# Patient Record
Sex: MALE | Race: WHITE | Employment: FULL TIME | URBAN - METROPOLITAN AREA
[De-identification: names, ages, dates, MRNs, and addresses within clinical notes are randomized per-mention and may not be internally consistent; named-entity substitution may affect disease eponyms.]

---

## 2017-01-24 DIAGNOSIS — I33.0 SBE (SUBACUTE BACTERIAL ENDOCARDITIS): Primary | ICD-10-CM

## 2017-01-24 RX ORDER — AMOXICILLIN 500 MG/1
2000 CAPSULE ORAL ONCE
Qty: 4 CAPSULE | Refills: 1 | Status: SHIPPED | OUTPATIENT
Start: 2017-01-24 | End: 2017-01-24

## 2017-04-13 ENCOUNTER — OFFICE VISIT (OUTPATIENT)
Dept: FAMILY MEDICINE | Facility: CLINIC | Age: 27
End: 2017-04-13
Payer: COMMERCIAL

## 2017-04-13 VITALS
TEMPERATURE: 98.3 F | OXYGEN SATURATION: 100 % | SYSTOLIC BLOOD PRESSURE: 120 MMHG | HEART RATE: 93 BPM | BODY MASS INDEX: 25.37 KG/M2 | DIASTOLIC BLOOD PRESSURE: 78 MMHG | WEIGHT: 171.8 LBS

## 2017-04-13 DIAGNOSIS — I10 BENIGN ESSENTIAL HYPERTENSION: ICD-10-CM

## 2017-04-13 DIAGNOSIS — R20.2 NUMBNESS AND TINGLING: ICD-10-CM

## 2017-04-13 DIAGNOSIS — M25.422 SWELLING OF JOINT, ELBOW, LEFT: ICD-10-CM

## 2017-04-13 DIAGNOSIS — R20.0 NUMBNESS AND TINGLING: ICD-10-CM

## 2017-04-13 DIAGNOSIS — R19.7 DIARRHEA, UNSPECIFIED TYPE: Primary | ICD-10-CM

## 2017-04-13 DIAGNOSIS — Z13.220 SCREENING CHOLESTEROL LEVEL: ICD-10-CM

## 2017-04-13 LAB
ALBUMIN SERPL-MCNC: 4.6 G/DL (ref 3.4–5)
ALP SERPL-CCNC: 55 U/L (ref 40–150)
ALT SERPL W P-5'-P-CCNC: 27 U/L (ref 0–70)
ANION GAP SERPL CALCULATED.3IONS-SCNC: 8 MMOL/L (ref 3–14)
AST SERPL W P-5'-P-CCNC: 27 U/L (ref 0–45)
BILIRUB SERPL-MCNC: 0.8 MG/DL (ref 0.2–1.3)
BUN SERPL-MCNC: 8 MG/DL (ref 7–30)
CALCIUM SERPL-MCNC: 9.4 MG/DL (ref 8.5–10.1)
CHLORIDE SERPL-SCNC: 105 MMOL/L (ref 94–109)
CHOLEST SERPL-MCNC: 124 MG/DL
CO2 SERPL-SCNC: 29 MMOL/L (ref 20–32)
CREAT SERPL-MCNC: 0.94 MG/DL (ref 0.66–1.25)
GFR SERPL CREATININE-BSD FRML MDRD: ABNORMAL ML/MIN/1.7M2
GLUCOSE SERPL-MCNC: 101 MG/DL (ref 70–99)
HDLC SERPL-MCNC: 55 MG/DL
LDLC SERPL CALC-MCNC: 51 MG/DL
NONHDLC SERPL-MCNC: 69 MG/DL
POTASSIUM SERPL-SCNC: 4.5 MMOL/L (ref 3.4–5.3)
PROT SERPL-MCNC: 7.2 G/DL (ref 6.8–8.8)
SODIUM SERPL-SCNC: 142 MMOL/L (ref 133–144)
TRIGL SERPL-MCNC: 90 MG/DL
VIT B12 SERPL-MCNC: 508 PG/ML (ref 193–986)

## 2017-04-13 PROCEDURE — 82607 VITAMIN B-12: CPT | Performed by: NURSE PRACTITIONER

## 2017-04-13 PROCEDURE — 80053 COMPREHEN METABOLIC PANEL: CPT | Performed by: NURSE PRACTITIONER

## 2017-04-13 PROCEDURE — 36415 COLL VENOUS BLD VENIPUNCTURE: CPT | Performed by: NURSE PRACTITIONER

## 2017-04-13 PROCEDURE — 80061 LIPID PANEL: CPT | Performed by: NURSE PRACTITIONER

## 2017-04-13 PROCEDURE — 83516 IMMUNOASSAY NONANTIBODY: CPT | Performed by: NURSE PRACTITIONER

## 2017-04-13 PROCEDURE — 99214 OFFICE O/P EST MOD 30 MIN: CPT | Performed by: NURSE PRACTITIONER

## 2017-04-13 NOTE — MR AVS SNAPSHOT
After Visit Summary   4/13/2017    Ludwin Massey    MRN: [id number]           Patient Information     Date Of Birth          1990        Visit Information        Provider Department      4/13/2017 9:00 AM Tamiko Flannery APRN CNP [facility]        Today's Diagnoses     Screening cholesterol level    -  1    Benign essential hypertension        Diarrhea, unspecified type        Numbness and tingling        Swelling of joint, elbow, left           Follow-ups after your visit        Additional Services     ORTHO  REFERRAL       [facility] is referring you to the Orthopedic  Services at [facility].       The  Representative will assist you in the coordination of your Orthopedic and Musculoskeletal Care as prescribed by your physician.    The  Representative will call you within 1 business day to help schedule your appointment, or you may contact the  Representative at:    All areas ~ [phone]     Type of Referral : Surgical / Specialist       Timeframe requested: Routine    Coverage of these services is subject to the terms and limitations of your health insurance plan.  Please call member services at your health plan with any benefit or coverage questions.      If X-rays, CT or MRI's have been performed, please contact the facility where they were done to arrange for , prior to your scheduled appointment.  Please bring this referral request to your appointment and present it to your specialist.                  Future tests that were ordered for you today     Open Future Orders        Priority Expected Expires Ordered    XR Elbow Left 2 Views Routine 4/13/2017 4/13/2018 4/13/2017            Who to contact     If you have questions or need follow up information about today's clinic visit or your schedule please contact [facility] directly at [phone].  Normal or  non-critical lab and imaging results will be communicated to you by MyChart, letter or phone within 4 business days after the clinic has received the results. If you do not hear from us within 7 days, please contact the clinic through [employer] or phone. If you have a critical or abnormal lab result, we will notify you by phone as soon as possible.  Submit refill requests through [employer] or call your pharmacy and they will forward the refill request to us. Please allow 3 business days for your refill to be completed.          Additional Information About Your Visit        [employer] Information     [employer] gives you secure access to your electronic health record. If you see a primary care provider, you can also send messages to your care team and make appointments. If you have questions, please call your primary care clinic.  If you do not have a primary care provider, please call [phone] and they will assist you.        Care EveryWhere ID     This is your Care EveryWhere ID. This could be used by other organizations to access your [facility] medical records  [id number]        Your Vitals Were     Pulse Temperature Pulse Oximetry BMI (Body Mass Index)          93 98.3  F (36.8  C) (Oral) 100% 25.37 kg/m2         Blood Pressure from Last 3 Encounters:   04/13/17 120/78   09/12/16 128/84   08/12/16 136/82    Weight from Last 3 Encounters:   04/13/17 171 lb 12.8 oz (77.9 kg)   09/12/16 175 lb (79.4 kg)   08/12/16 176 lb 2.4 oz (79.9 kg)              We Performed the Following     Comprehensive metabolic panel     Lipid Profile     [facility]     Tissue transglutaminase blair IgA and IgG     Vitamin B12        Primary Care Provider    [facility]       No address on file        Thank you!     Thank you for choosing [facility]  for your care. Our goal is always to provide you with excellent care. Hearing back from our patients is one way we can continue to improve our  services. Please take a few minutes to complete the written survey that you may receive in the mail after your visit with us. Thank you!             Your Updated Medication List - Protect others around you: Learn how to safely use, store and throw away your medicines at www.disposemymeds.org.          This list is accurate as of: 4/13/17  9:21 AM.  Always use your most recent med list.                   Brand Name Dispense Instructions for use    MULTIVITAMIN PO      Take 1 tablet by mouth daily Reported on 4/13/2017

## 2017-04-13 NOTE — NURSING NOTE
"Chief Complaint   Patient presents with     Blood Draw     Diarrhea       Initial /90 (BP Location: Right arm, Patient Position: Chair, Cuff Size: Adult Regular)  Pulse 93  Temp 98.3  F (36.8  C) (Oral)  Wt 171 lb 12.8 oz (77.9 kg)  SpO2 100%  BMI 25.37 kg/m2 Estimated body mass index is 25.37 kg/(m^2) as calculated from the following:    Height as of 9/12/16: 5' 9\" (1.753 m).    Weight as of this encounter: 171 lb 12.8 oz (77.9 kg).  Medication Reconciliation: complete     Sangita Diggs CMA    "

## 2017-04-13 NOTE — PROGRESS NOTES
SUBJECTIVE:                                                    Ludwin Massey is a 27 year old male who presents to clinic today for the following health issues:    Diarrhea     Onset: Over 10 years    Description:   Consistency of stool: loose  Blood in stool: no   Number of loose stools in past 24 hours: 0    Progression of Symptoms:  improving    Accompanying Signs & Symptoms:  Fever: no   Nausea or vomiting; no   Abdominal pain: no   Episodes of constipation: no   Weight loss: no    History:   Ill contacts: no   Recent use of antibiotics: 2 months ago  Recent travels: no          Recent medication-new or changes(Rx or OTC): no     Precipitating factors:   Gluten    Alleviating factors:   Eliminating gluten from diet, very helpful. A few of his relatives have celiac disease/gluten sensitivity, he would like to be tested today.       About 10 years ago had an injury to left elbow while playing hockey- arm was pulled and hyperextended. Occasionally gets swelling and pain in left elbow. Does not typically have issues with range of motion of arm or strength in arm. Occasionally notices numbness and tingling in pinkie finger if hold bhis arm a certain way- needs to readjust while working at his desk occasionally.    Had sciatica several years ago, which occasionally flares up and causes occasional numbness in left thigh. No weakness in leg, no bowel or bladder symptoms. No current back pain issues.     Hypertension Follow-up      Outpatient blood pressures are being checked at home.  Results are 115/70.    Low Salt Diet: no added salt    Exercising regularly       Problem list and histories reviewed & adjusted, as indicated.  Additional history: as documented    BP Readings from Last 3 Encounters:   04/13/17 120/78   09/12/16 128/84   08/12/16 136/82    Wt Readings from Last 3 Encounters:   04/13/17 171 lb 12.8 oz (77.9 kg)   09/12/16 175 lb (79.4 kg)   08/12/16 176 lb 2.4 oz (79.9 kg)                  Labs reviewed  in EPIC    Reviewed and updated as needed this visit by clinical staff       Reviewed and updated as needed this visit by Provider         ROS:  Constitutional, HEENT, cardiovascular, pulmonary, gi and gu systems are negative, except as otherwise noted.    OBJECTIVE:                                                    /78  Pulse 93  Temp 98.3  F (36.8  C) (Oral)  Wt 171 lb 12.8 oz (77.9 kg)  SpO2 100%  BMI 25.37 kg/m2  Body mass index is 25.37 kg/(m^2).  GENERAL: healthy, alert and no distress  NECK: no adenopathy, no asymmetry, masses, or scars and thyroid normal to palpation  RESP: lungs clear to auscultation - no rales, rhonchi or wheezes  CV: regular rate and rhythm, normal S1 S2, no S3 or S4, no murmur, click or rub, no peripheral edema and peripheral pulses strong  ABDOMEN: soft, nontender, no hepatosplenomegaly, no masses and bowel sounds normal  MS: RUE exam shows normal strength and muscle mass, no deformities, no erythema, induration, or nodules, ROM of all joints is normal and no evidence of joint instability and LUE exam shows normal strength and muscle mass, no deformities, no erythema, induration, or nodules, no evidence of joint effusion and ROM of all joints is normal  BACK: no CVA tenderness, no paralumbar tenderness; normal patellar reflexes bilaterally  Diagnostic Test Results:  No results found for this or any previous visit (from the past 24 hour(s)).     ASSESSMENT/PLAN:                                                      Problem List Items Addressed This Visit     Benign essential hypertension    Relevant Orders    Comprehensive metabolic panel (Completed)      Other Visit Diagnoses     Screening cholesterol level    -  Primary    Relevant Orders    Lipid Profile (Completed)    Diarrhea, unspecified type        Relevant Orders    Tissue transglutaminase blair IgA and IgG (Completed)    Numbness and tingling        Relevant Orders    Vitamin B12 (Completed)    Swelling of joint, elbow,  left        Relevant Orders    XR Elbow Left 2 Views    ORTHO  REFERRAL (Completed)         Discussed okay to continue gluten free diet if it relieves his diarrhea symptoms, even if he does not have celiac disease.   Will xray Elbow and send to Ortho to further evaluate, discussed following up for back if symptoms worsen or if he would like to try physical therapy.  Blood pressure currently well controlled.  DEBIBE [facility]  Please note greater than 50% of this 30 minute appointment were spent in counseling with the patient of the issues described above in the history of present illness and in the plan, including ordering blood work

## 2017-04-18 LAB
TTG IGA SER-ACNC: 1 U/ML
TTG IGG SER-ACNC: NORMAL U/ML

## 2017-10-23 ENCOUNTER — HOSPITAL ENCOUNTER (OUTPATIENT)
Dept: CARDIOLOGY | Facility: CLINIC | Age: 27
Discharge: HOME | End: 2017-10-23
Attending: INTERNAL MEDICINE | Admitting: INTERNAL MEDICINE
Payer: COMMERCIAL

## 2017-10-23 DIAGNOSIS — Z87.74 S/P CLOSURE OF CONGENITAL ATRIAL SEPTAL DEFECT BY PERCUTANEOUS TRANSCATHETER TECHNIQUE: ICD-10-CM

## 2017-10-23 PROCEDURE — 93306 TTE W/DOPPLER COMPLETE: CPT

## 2017-10-23 PROCEDURE — 93306 TTE W/DOPPLER COMPLETE: CPT | Mod: 26 | Performed by: INTERNAL MEDICINE

## 2017-11-07 ENCOUNTER — PRE VISIT (OUTPATIENT)
Dept: CARDIOLOGY | Facility: CLINIC | Age: 27
End: 2017-11-07

## 2017-11-07 PROBLEM — Q24.9 CONGENITAL HEART ANOMALY: Status: ACTIVE | Noted: 2017-11-07

## 2017-11-07 PROBLEM — Q23.82 CONGENITAL CLEFT LEAFLET OF MITRAL VALVE: Status: ACTIVE | Noted: 2017-11-07

## 2017-11-09 ENCOUNTER — OFFICE VISIT (OUTPATIENT)
Dept: CARDIOLOGY | Facility: CLINIC | Age: 27
End: 2017-11-09
Attending: INTERNAL MEDICINE
Payer: COMMERCIAL

## 2017-11-09 VITALS
HEIGHT: 69 IN | HEART RATE: 88 BPM | SYSTOLIC BLOOD PRESSURE: 146 MMHG | BODY MASS INDEX: 24.87 KG/M2 | WEIGHT: 167.9 LBS | DIASTOLIC BLOOD PRESSURE: 90 MMHG

## 2017-11-09 DIAGNOSIS — I10 HYPERTENSION, UNSPECIFIED TYPE: ICD-10-CM

## 2017-11-09 DIAGNOSIS — Q23.82 CONGENITAL CLEFT LEAFLET OF MITRAL VALVE: Primary | ICD-10-CM

## 2017-11-09 DIAGNOSIS — Z87.74 S/P CLOSURE OF CONGENITAL ATRIAL SEPTAL DEFECT BY PERCUTANEOUS TRANSCATHETER TECHNIQUE: ICD-10-CM

## 2017-11-09 DIAGNOSIS — Q21.20 ASD (ATRIAL SEPTAL DEFECT), OSTIUM PRIMUM: ICD-10-CM

## 2017-11-09 PROCEDURE — 99214 OFFICE O/P EST MOD 30 MIN: CPT | Performed by: INTERNAL MEDICINE

## 2017-11-09 RX ORDER — METOPROLOL SUCCINATE 25 MG/1
25 TABLET, EXTENDED RELEASE ORAL DAILY
Qty: 90 TABLET | Refills: 3 | Status: SHIPPED | OUTPATIENT
Start: 2017-11-09 | End: 2017-12-14

## 2017-11-09 NOTE — PATIENT INSTRUCTIONS
"You were seen today in the Adult Congenital and Cardiovascular Genetics Clinic at the [facility].    Cardiology Providers you saw during your visit:  Dr. 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  Continue to eat a heart healthy, low salt diet.  2.  Continue to get 20-30 minutes of aerobic activity, 4-5 days per week.  Examples of aerobic activity include walking, running, swimming, cycling, etc.  3.  Continue to observe good oral hygiene, with regular dental visits.  4.  Please start Metoprolol XL 25 mg daily. Please call if you do not tolerate this medication.  5. 2-3 weeks after starting the Metoprolol please wear an ambulatory blood pressure monitor for 24 hours to see if this is better controlling your blood pressure.  6. Please have a sleep study done. The phone number is [phone].      Vitals:    11/09/17 0844 11/09/17 0904   BP: (!) 158/103 146/90   Pulse: 88    Weight: 76.2 kg (167 lb 14.4 oz)    Height: 1.753 m (5' 9\")        SBE prophylaxis:   Yes____  No__X__    Lifelong Bacterial Endocarditis Prophylaxis:  YES____  NO__X__    If YES is checked, follow the recommendations outlined below:  1. Take antibiotic(s) prior to interventional procedures or surgeries (dental, respiratory, urologic, gastrointestinal, gynecologic), or instrumental examinations.   2. Observe good oral hygiene daily, as advised by your dentist. Get regular professional dental care.  3. Keep cuts clean.  4. Infections should be treated promptly.      Exercise restrictions:   Yes__X__  No____         If yes, list restrictions:  Must be allowed to rest if fatigued or SOB      Work restrictions:  Yes____  No__X__         If yes, list restrictions:    FASTING CHOLESTEROL was checked in the last 5 years YES_X__  NO___ 2017  Continue to eat a heart healthy, low salt diet.         ____ Fasting lipid " panel order today         ____ No changes in medications          ____ I recommend the following changes in your cholesterol medications.:          ____ Please follow up for cholesterol screening at your primary care physician      Follow-up:  Follow up with Dr. Rojo in 1 year with a stress test and an echo.    For after hours urgent needs, call [phone] and ask to speak to the Adult Congenital Physician on call.  Mention Job Code 0401.    For emergencies call [phone].    For any scheduling needs and to contact your nurse in the Adult Congenital and Cardiovascular Genetics Clinic, please call [facility] , at [phone].    Thank you for your visit today!  If you have questions or concerns about today's visit, please call me.    Norman Juares RN, BSN  Cardiology Care Coordinator  [facility]  [email or web address]  Ph.[phone]    [facility]  [facility]   Clinics and [facility] Center  Mail Code [id number]  [address] [place]  [address]

## 2017-11-09 NOTE — LETTER
11/9/2017    Tamiko Flannery, APRN CNP  [address]  [facility] [address]    RE: Ludwin Massey       Dear Colleague,    I had the pleasure of seeing Ludwin Massey in the [facility].    Mr. 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  This was actually not diagnosed until he was 13 years old.  He was having palpitations and ended up with an echo in [place] and then had surgery at the [place].  He was followed by Dr. Wang since 2003 with his surgery in 06/2003.  We do not have that operative report at this time, but we are working on getting it.      Mr. Massey has been followed throughout the years and has been doing well actually.  His repair is excellent.  No residual deficit and just mild mitral and tricuspid insufficiency.  There is no evidence of pulmonary hypertension.  He is active.  He rides bike and has symptoms that are only concerning for palpitations, but no significant fatigue or shortness of breath.  His palpitations actually he has been able to correlate with diet and with alcohol.  He notes that gluten free he feels significantly better and his lifelong abdominal issues are resolved and his palpitations are lessened.  He notes with alcohol that he has palpitations that are worse as well.  When he saw Dr. Patton last year, Dr. Patton did a Holter monitor and that showed 3-4 beats of ventricular tachycardia that was triggered as one of his events as well as a short run of SVT.  He does note on occasion when he gets palpitations that if he squats down his symptoms will resolve.  He actually has been able to manage his palpitations, however, with his diet as noted and, again, notes they are significantly better.  He does not drink alcohol at this time as well because again the palpitations are worse.        Mr. Massey underwent an echo as part of his visit  today.  This was done on 10/23/2017 and this showed mild mitral and tricuspid insufficiency and no other significant findings.  Normal left and right ventricular chamber size and function without any significant pulmonary hypertension.  His RVSP is estimated at 18+ right atrial pressure, which at most is 10.  He is hypertensive today and notes that he has noted his blood pressures have been trending up.  Even on the 3-cycle blood pressure reading, he was in the 150s.  He is not currently on any medications.  He has no family history of congenital heart disease or early coronary artery disease.  His dad, however,  at 42.  He was an over-the-road  and weighed about 375 and apparently  from a PE unfortunately.  There are 2 brothers who are healthy and mom is living.  He has been taking antibiotic prophylaxis before dental work but is aware now of the guidelines and how those recommendations have changed.  He is .  His wife works in [employer].  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  His wife did have a fetal echo and the heart was structurally normal.  The only other symptom that is concerning is he has trouble sometimes waking up in the middle of the night short of breath and comes from a family history, in fact all of his siblings have obstructive sleep apnea and are not overweight.  He has not had a sleep study at this point and does meet criteria given his hypertension at a young age and his symptoms.  There is no tobacco use, and again no concerning symptoms.  No syncope, presyncope.     Outpatient Encounter Prescriptions as of 2017   Medication Sig Dispense Refill     metoprolol (TOPROL-XL) 25 MG 24 hr tablet Take 1 tablet (25 mg) by mouth daily 90 tablet 3     [DISCONTINUED] Multiple Vitamins-Minerals (MULTIVITAMIN OR) Take 1 tablet by mouth daily Reported on 2017       No facility-administered encounter  medications on file as of 11/9/2017.       IMPRESSION, REPORT AND PLAN:   1.  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  We do not have this operative report.   2.  Mild mitral and tricuspid insufficiency, stable.   3.  Possible obstructive sleep apnea.   4.  History of symptomatic short runs of SVT and PVCs with runs up to 4 beats on Zio Patch monitoring 09/2016.  Improved with diet.   5.  Probable celiac disease.   6.  Hypertension.      DISCUSSION:  It was a pleasure to be involved in the care of Mr. Massey.  Today I discussed his history, symptoms and his testing in detail as outlined.  He is aware of his cardiac anatomy.  At this time, it seems like he has really been able to correlate his palpitations with diet as if he has gluten they get worse, as well as alcohol, which is not unsurprising.  His symptoms do sound like celiac disease and they are so bad when he does have gluten that he does not want to come off a gluten-free diet for the period of 6 weeks to make the definitive diagnosis, which is understandable.  Continuing with his diet as he is doing that he feels best with is appropriate.  For his symptoms of sleep apnea, I think it is warranted to do a sleep study and will get that ordered for him as well.  We discussed doing CPX and a repeat echo in a year.  For his hypertension, we discussed starting Toprol 25 mg a day at night.  This should help not only with hypertension but also with the occasional palpitations.  If he is unable to tolerate the Toprol for fatigue or other issues, we could to diltiazem or Cardizem.  He will let us know.  We discussed doing an ambulatory blood pressure monitor a few weeks after starting the Toprol just to be certain that he is under control.  At this point, he does not need antibiotic prophylaxis before dental work.  All questions were answered.  It was a pleasure to see him.  Please do not hesitate  to contact me with any questions or concerns.     Sincerely,    Rubina Rojo MD     [facility]

## 2017-11-09 NOTE — PROGRESS NOTES
HISTORY OF PRESENT ILLNESS:  Mr. 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  This was actually not diagnosed until he was 13 years old.  He was having palpitations and ended up with an echo in [place] and then had surgery at the [place].  He was followed by Dr. Wang since 2003 with his surgery in 06/2003.  We do not have that operative report at this time, but we are working on getting it.      Mr. Massey has been followed throughout the years and has been doing well actually.  His repair is excellent.  No residual deficit and just mild mitral and tricuspid insufficiency.  There is no evidence of pulmonary hypertension.  He is active.  He rides bike and has symptoms that are only concerning for palpitations, but no significant fatigue or shortness of breath.  His palpitations actually he has been able to correlate with diet and with alcohol.  He notes that gluten free he feels significantly better and his lifelong abdominal issues are resolved and his palpitations are lessened.  He notes with alcohol that he has palpitations that are worse as well.  When he saw Dr. Patton last year, Dr. Patton did a Holter monitor and that showed 3-4 beats of ventricular tachycardia that was triggered as one of his events as well as a short run of SVT.  He does note on occasion when he gets palpitations that if he squats down his symptoms will resolve.  He actually has been able to manage his palpitations, however, with his diet as noted and, again, notes they are significantly better.  He does not drink alcohol at this time as well because again the palpitations are worse.        Mr. Massey underwent an echo as part of his visit today.  This was done on 10/23/2017 and this showed mild mitral and tricuspid insufficiency and no other significant findings.  Normal left and right ventricular chamber size and function without any  significant pulmonary hypertension.  His RVSP is estimated at 18+ right atrial pressure, which at most is 10.  He is hypertensive today and notes that he has noted his blood pressures have been trending up.  Even on the 3-cycle blood pressure reading, he was in the 150s.  He is not currently on any medications.  He has no family history of congenital heart disease or early coronary artery disease.  His dad, however,  at 42.  He was an over-the-road  and weighed about 375 and apparently  from a PE unfortunately.  There are 2 brothers who are healthy and mom is living.  He has been taking antibiotic prophylaxis before dental work but is aware now of the guidelines and how those recommendations have changed.  He is .  His wife works in [employer].  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  His wife did have a fetal echo and the heart was structurally normal.  The only other symptom that is concerning is he has trouble sometimes waking up in the middle of the night short of breath and comes from a family history, in fact all of his siblings have obstructive sleep apnea and are not overweight.  He has not had a sleep study at this point and does meet criteria given his hypertension at a young age and his symptoms.  There is no tobacco use, and again no concerning symptoms.  No syncope, presyncope.      IMPRESSION, REPORT AND PLAN:   1.  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  We do not have this operative report.   2.  Mild mitral and tricuspid insufficiency, stable.   3.  Possible obstructive sleep apnea.   4.  History of symptomatic short runs of SVT and PVCs with runs up to 4 beats on Zio Patch monitoring 2016.  Improved with diet.   5.  Probable celiac disease.   6.  Hypertension.      DISCUSSION:  It was a pleasure to be involved in the care of   Shilpa.  Today I discussed his history, symptoms and his testing in detail as outlined.  He is aware of his cardiac anatomy.  At this time, it seems like he has really been able to correlate his palpitations with diet as if he has gluten they get worse, as well as alcohol, which is not unsurprising.  His symptoms do sound like celiac disease and they are so bad when he does have gluten that he does not want to come off a gluten-free diet for the period of 6 weeks to make the definitive diagnosis, which is understandable.  Continuing with his diet as he is doing that he feels best with is appropriate.  For his symptoms of sleep apnea, I think it is warranted to do a sleep study and will get that ordered for him as well.  We discussed doing CPX and a repeat echo in a year.  For his hypertension, we discussed starting Toprol 25 mg a day at night.  This should help not only with hypertension but also with the occasional palpitations.  If he is unable to tolerate the Toprol for fatigue or other issues, we could to diltiazem or Cardizem.  He will let us know.  We discussed doing an ambulatory blood pressure monitor a few weeks after starting the Toprol just to be certain that he is under control.  At this point, he does not need antibiotic prophylaxis before dental work.  All questions were answered.  It was a pleasure to see him.  Please do not hesitate to contact me with any questions or concerns.         ETHAN ANDREWS MD             D: 2017 10:10   T: 2017 11:55   MT: gian      Name:     MEL GREER   MRN:      [id number]        Account:      [id number]   :      1990           Service Date: 2017      Document: [id number]

## 2017-11-09 NOTE — NURSING NOTE
Chief Complaint   Patient presents with     [facility] Cardiac testing     Echo     Congenital Heart Disease        Cardiac Testing: Patient given instructions regarding  echocardiogram . Discussed purpose, preparation, procedure and when to expect results reported back to the patient. Patient demonstrated understanding of this information and agreed to call with further questions or concerns.  Med Reconcile: Reviewed and verified all current medications with the patient. The updated medication list was printed and given to the patient.  Return Appointment: Patient given instructions regarding scheduling next clinic visit. Patient demonstrated understanding of this information and agreed to call with further questions or concerns.  Patient stated he understood all health information given and agreed to call with further questions or concerns.     Norman Juares RN, BSN  Cardiology Care Coordinator  [facility]  [email or web address]  [phone]

## 2017-11-09 NOTE — MR AVS SNAPSHOT
"              After Visit Summary   11/9/2017    Ludwin Massey    MRN: [id number]           Patient Information     Date Of Birth          1990        Visit Information        Provider Department      11/9/2017 9:00 AM March, Rubina Regalado MD [facility]        Today's Diagnoses     Congenital cleft leaflet of mitral valve    -  1    S/P closure of congenital atrial septal defect by percutaneous transcatheter technique        ASD (atrial septal defect), ostium primum s/p repair         HTN (hypertension)          Care Instructions    You were seen today in the Adult Congenital and Cardiovascular Genetics Clinic at the [facility].    Cardiology Providers you saw during your visit:  Dr. 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  Continue to eat a heart healthy, low salt diet.  2.  Continue to get 20-30 minutes of aerobic activity, 4-5 days per week.  Examples of aerobic activity include walking, running, swimming, cycling, etc.  3.  Continue to observe good oral hygiene, with regular dental visits.  4.  Please start Metoprolol XL 25 mg daily. Please call if you do not tolerate this medication.  5. 2-3 weeks after starting the Metoprolol please wear an ambulatory blood pressure monitor for 24 hours to see if this is better controlling your blood pressure.  6. Please have a sleep study done. The phone number is [phone].      Vitals:    11/09/17 0844 11/09/17 0904   BP: (!) 158/103 146/90   Pulse: 88    Weight: 76.2 kg (167 lb 14.4 oz)    Height: 1.753 m (5' 9\")        SBE prophylaxis:   Yes____  No__X__    Lifelong Bacterial Endocarditis Prophylaxis:  YES____  NO__X__    If YES is checked, follow the recommendations outlined below:  1. Take antibiotic(s) prior to interventional procedures or surgeries (dental, respiratory, urologic, " gastrointestinal, gynecologic), or instrumental examinations.   2. Observe good oral hygiene daily, as advised by your dentist. Get regular professional dental care.  3. Keep cuts clean.  4. Infections should be treated promptly.      Exercise restrictions:   Yes__X__  No____         If yes, list restrictions:  Must be allowed to rest if fatigued or SOB      Work restrictions:  Yes____  No__X__         If yes, list restrictions:    FASTING CHOLESTEROL was checked in the last 5 years YES_X__  NO___ 2017  Continue to eat a heart healthy, low salt diet.         ____ Fasting lipid panel order today         ____ No changes in medications          ____ I recommend the following changes in your cholesterol medications.:          ____ Please follow up for cholesterol screening at your primary care physician      Follow-up:  Follow up with Dr. Rojo in 1 year with a stress test and an echo.    For after hours urgent needs, call [phone] and ask to speak to the Adult Congenital Physician on call.  Mention Job Code 0401.    For emergencies call 911.    For any scheduling needs and to contact your nurse in the Adult Congenital and Cardiovascular Genetics Clinic, please call Piero Linares, Procedure , at [phone].    Thank you for your visit today!  If you have questions or concerns about today's visit, please call me.    Norman Juares RN, BSN  Cardiology Care Coordinator  [facility]  [email or web address]  [phone]    [facility]  [facility]   Clinics and [facility] Center  Mail Code [id number]  [address] [place]  [address]           Follow-ups after your visit        Additional Services     SLEEP EVALUATION & MANAGEMENT REFERRAL - [facility]  [phone] (Age 18 and up)       Please be aware that coverage of these services is subject to the terms and limitations of your  health insurance plan.  Call member services at your health plan with any benefit or coverage questions.      Please bring the following to your appointment:    >>   List of current medications   >>   This referral request   >>   Any documents/labs given to you for this referral                      Future tests that were ordered for you today     Open Future Orders        Priority Expected Expires Ordered    24 Hour Blood Pressure Monitor - Adult Routine  3/24/2018 11/9/2017    SLEEP EVALUATION & MANAGEMENT REFERRAL - ADULT -[facility] [place]  [phone] (Age 18 and up) Routine  11/9/2018 11/9/2017            Who to contact     If you have questions or need follow up information about today's clinic visit or your schedule please contact [facility] directly at [phone].  Normal or non-critical lab and imaging results will be communicated to you by MyChart, letter or phone within 4 business days after the clinic has received the results. If you do not hear from us within 7 days, please contact the clinic through [employer] or phone. If you have a critical or abnormal lab result, we will notify you by phone as soon as possible.  Submit refill requests through [employer] or call your pharmacy and they will forward the refill request to us. Please allow 3 business days for your refill to be completed.          Additional Information About Your Visit        [employer] Information     [employer] gives you secure access to your electronic health record. If you see a primary care provider, you can also send messages to your care team and make appointments. If you have questions, please call your primary care clinic.  If you do not have a primary care provider, please call [phone] and they will assist you.        Care EveryWhere ID     This is your Care EveryWhere ID. This could be used by other organizations to access your [facility] [place] medical records  [id number]       "  Your Vitals Were     Pulse Height BMI (Body Mass Index)             88 1.753 m (5' 9\") 24.79 kg/m2          Blood Pressure from Last 3 Encounters:   11/09/17 146/90   04/13/17 120/78   09/12/16 128/84    Weight from Last 3 Encounters:   11/09/17 76.2 kg (167 lb 14.4 oz)   04/13/17 77.9 kg (171 lb 12.8 oz)   09/12/16 79.4 kg (175 lb)              We Performed the Following     Follow-Up with Cardiologist          Today's Medication Changes          These changes are accurate as of: 11/9/17  9:51 AM.  If you have any questions, ask your nurse or doctor.               Start taking these medicines.        Dose/Directions    metoprolol 25 MG 24 hr tablet   Commonly known as:  TOPROL-XL   Used for:  S/P closure of congenital atrial septal defect by percutaneous transcatheter technique, Congenital cleft leaflet of mitral valve, ASD (atrial septal defect), ostium primum, HTN (hypertension)   Started by:  Rubina Rojo MD        Dose:  25 mg   Take 1 tablet (25 mg) by mouth daily   Quantity:  90 tablet   Refills:  3            Where to get your medicines      These medications were sent to [facility] [address] [place] [address] [address]  AT [facility] [address]  [address] [facility] [address]     Phone:  [phone]     metoprolol 25 MG 24 hr tablet                Primary Care Provider Office Phone # Fax #    Tamiko Nathalie Flannery, APRN [facility] [phone]       [address] [address]        Equal Access to Services     ANAT URIARTE AH: Hadjung Johnson, walisada luqadaha, qaybta kaalmaesau cornell. So [facility] [phone].    ATENCIÓN: Si habla español, tiene a jones disposición servicios gratuitos de asistencia lingüística. Preethi marinelli [phone].    We comply with applicable federal civil rights laws and [place] laws. We do not discriminate on the basis of race, color, national origin, age, disability, " sex, sexual orientation, or gender identity.            Thank you!     Thank you for choosing [facility]  for your care. Our goal is always to provide you with excellent care. Hearing back from our patients is one way we can continue to improve our services. Please take a few minutes to complete the written survey that you may receive in the mail after your visit with us. Thank you!             Your Updated Medication List - Protect others around you: Learn how to safely use, store and throw away your medicines at www.disposemymeds.org.          This list is accurate as of: 11/9/17  9:51 AM.  Always use your most recent med list.                   Brand Name Dispense Instructions for use Diagnosis    metoprolol 25 MG 24 hr tablet    TOPROL-XL    90 tablet    Take 1 tablet (25 mg) by mouth daily    S/P closure of congenital atrial septal defect by percutaneous transcatheter technique, Congenital cleft leaflet of mitral valve, ASD (atrial septal defect), ostium primum, HTN (hypertension)

## 2017-11-09 NOTE — H&P
"HPI:     Please see dictated note    PAST MEDICAL HISTORY:  History reviewed. No pertinent past medical history.    CURRENT MEDICATIONS:  No current outpatient prescriptions on file.       PAST SURGICAL HISTORY:  History reviewed. No pertinent surgical history.    ALLERGIES   No Known Allergies    FAMILY HISTORY:  Family History   Problem Relation Age of Onset     Hypertension Father      Thrombosis Father 42     DVT     Other - See Comments Father      WPW     Thrombosis Mother      Colon Cancer Maternal Grandfather      Migraines Paternal Grandmother      Dementia Paternal Grandfather      Hypertension Brother      Other - See Comments Paternal Aunt      Mitral valve prolapse       SOCIAL HISTORY:  Social History     Social History     Marital status: Single     Spouse name: N/A     Number of children: N/A     Years of education: N/A     Social History Main Topics     Smoking status: Never Smoker     Smokeless tobacco: Never Used     Alcohol use Yes      Comment: very rare     Drug use: No     Sexual activity: Yes     Other Topics Concern     Parent/Sibling W/ Cabg, Mi Or Angioplasty Before 65f 55m? No     Social History Narrative       ROS:   Constitutional: No fever, chills, or sweats. No weight gain/loss   ENT: No visual disturbance, ear ache, epistaxis, sore throat  Allergies/Immunologic: Negative.   Respiratory: No cough, hemoptysia  Cardiovascular: As per HPI  GI: No nausea, vomiting, hematemesis, melena, or hematochezia  : No urinary frequency, dysuria, or hematuria  Integument: Negative  Psychiatric: Negative  Neuro: Negative  Endocrinology: Negative   Musculoskeletal: Negative    EXAM:  BP (!) 158/103  Pulse 88  Ht 1.753 m (5' 9\")  Wt 76.2 kg (167 lb 14.4 oz)  BMI 24.79 kg/m2  In general, the patient is a pleasant male in no apparent distress.    HEENT: NC/AT.  PERRLA.  EOMI.  Sclerae white, not injected.  Nares clear.  Pharynx without erythema or exudate.  Dentition intact.    Neck:  Carotids +4/4 " bilaterally without bruits.  No jugular venous distension.   Heart: RRR. Normal S1, S2 splits physiologically. There are no heaves or lifts. There is a 2/6 holosystolic murmur that is soft at the RLSB and the apex.   Lungs: CTA.  No ronchi, wheezes, rales.  .   Abdomen: Soft, nontender, nondistended. No organomegaly.   Extremities: No clubbing, cyanosis, or edema.    Neurologic: Alert and oriented to person/place/time, normal speech, gait and affect  Skin: No petechiae, purpura or rash.    Labs:  LIPID RESULTS:  Lab Results   Component Value Date    CHOL 124 04/13/2017    HDL 55 04/13/2017    LDL 51 04/13/2017    TRIG 90 04/13/2017    NHDL 69 04/13/2017       LIVER ENZYME RESULTS:  Lab Results   Component Value Date    AST 27 04/13/2017    ALT 27 04/13/2017       CBC RESULTS:  No results found for: WBC, RBC, HGB, HCT, MCV, MCH, MCHC, RDW, PLT    BMP RESULTS:  Lab Results   Component Value Date     04/13/2017    POTASSIUM 4.5 04/13/2017    CHLORIDE 105 04/13/2017    CO2 29 04/13/2017    ANIONGAP 8 04/13/2017     (H) 04/13/2017    BUN 8 04/13/2017    CR 0.94 04/13/2017    GFRESTIMATED >90  Non  GFR Calc   04/13/2017    GFRESTBLACK >90   GFR Calc   04/13/2017    BHUPENDRA 9.4 04/13/2017        NEGRITA Rojo MD [facility]  Adult Congenital and Interventional Cardiology  [facility]      CC  Patient Care Team:  Tamiko Flannery APRN CNP as PCP - General (Nurse Practitioner)  Norman Juares, RN as Nurse Coordinator (Cardiology)  Rubina Rojo MD as MD (Cardiology)  TEE REESE

## 2017-12-03 ENCOUNTER — OFFICE VISIT (OUTPATIENT)
Dept: URGENT CARE | Facility: URGENT CARE | Age: 27
End: 2017-12-03
Payer: COMMERCIAL

## 2017-12-03 VITALS
WEIGHT: 167 LBS | OXYGEN SATURATION: 99 % | TEMPERATURE: 98.1 F | SYSTOLIC BLOOD PRESSURE: 110 MMHG | RESPIRATION RATE: 16 BRPM | HEART RATE: 83 BPM | BODY MASS INDEX: 24.66 KG/M2 | DIASTOLIC BLOOD PRESSURE: 70 MMHG

## 2017-12-03 DIAGNOSIS — J02.9 ACUTE PHARYNGITIS, UNSPECIFIED ETIOLOGY: Primary | ICD-10-CM

## 2017-12-03 LAB
DEPRECATED S PYO AG THROAT QL EIA: NORMAL
SPECIMEN SOURCE: NORMAL

## 2017-12-03 PROCEDURE — 87081 CULTURE SCREEN ONLY: CPT | Performed by: FAMILY MEDICINE

## 2017-12-03 PROCEDURE — 87880 STREP A ASSAY W/OPTIC: CPT | Performed by: FAMILY MEDICINE

## 2017-12-03 PROCEDURE — 99213 OFFICE O/P EST LOW 20 MIN: CPT | Performed by: FAMILY MEDICINE

## 2017-12-03 NOTE — NURSING NOTE
"Ludwin Massey is a 27 year old male.      Chief Complaint   Patient presents with     Urgent Care     Pharyngitis     pt is here for a ST wants to R/U strep        Initial /70 (BP Location: Right arm, Cuff Size: Adult Large)  Pulse 83  Temp 98.1  F (36.7  C) (Oral)  Resp 16  Wt 167 lb (75.8 kg)  SpO2 99%  BMI 24.66 kg/m2 Estimated body mass index is 24.66 kg/(m^2) as calculated from the following:    Height as of 11/9/17: 5' 9\" (1.753 m).    Weight as of this encounter: 167 lb (75.8 kg).  Medication Reconciliation: complete      Questioned patient about current smoking habits.  Pt. has never smoked.      So Blue CMA      "

## 2017-12-04 LAB
BACTERIA SPEC CULT: NORMAL
SPECIMEN SOURCE: NORMAL

## 2017-12-06 ENCOUNTER — HOSPITAL ENCOUNTER (OUTPATIENT)
Dept: CARDIOLOGY | Facility: CLINIC | Age: 27
Discharge: HOME | End: 2017-12-06
Attending: INTERNAL MEDICINE | Admitting: INTERNAL MEDICINE
Payer: COMMERCIAL

## 2017-12-06 DIAGNOSIS — Q23.82 CONGENITAL CLEFT LEAFLET OF MITRAL VALVE: ICD-10-CM

## 2017-12-06 DIAGNOSIS — Q21.20 ASD (ATRIAL SEPTAL DEFECT), OSTIUM PRIMUM: ICD-10-CM

## 2017-12-06 DIAGNOSIS — Z87.74 S/P CLOSURE OF CONGENITAL ATRIAL SEPTAL DEFECT BY PERCUTANEOUS TRANSCATHETER TECHNIQUE: ICD-10-CM

## 2017-12-06 DIAGNOSIS — I10 HYPERTENSION, UNSPECIFIED TYPE: ICD-10-CM

## 2017-12-06 PROCEDURE — 93790 AMBL BP MNTR W/SW I&R: CPT | Performed by: INTERNAL MEDICINE

## 2017-12-06 PROCEDURE — 93788 AMBL BP MNTR W/SW A/R: CPT | Performed by: INTERNAL MEDICINE

## 2017-12-07 ENCOUNTER — OFFICE VISIT (OUTPATIENT)
Dept: SLEEP MEDICINE | Facility: CLINIC | Age: 27
End: 2017-12-07
Attending: INTERNAL MEDICINE
Payer: COMMERCIAL

## 2017-12-07 VITALS
HEART RATE: 88 BPM | RESPIRATION RATE: 10 BRPM | OXYGEN SATURATION: 100 % | BODY MASS INDEX: 24.73 KG/M2 | WEIGHT: 167 LBS | HEIGHT: 69 IN | DIASTOLIC BLOOD PRESSURE: 88 MMHG | SYSTOLIC BLOOD PRESSURE: 154 MMHG

## 2017-12-07 DIAGNOSIS — I10 ESSENTIAL HYPERTENSION: ICD-10-CM

## 2017-12-07 DIAGNOSIS — G47.52 DREAM ENACTMENT BEHAVIOR: ICD-10-CM

## 2017-12-07 DIAGNOSIS — G47.9 SLEEP DISTURBANCE: Primary | ICD-10-CM

## 2017-12-07 PROCEDURE — 99215 OFFICE O/P EST HI 40 MIN: CPT | Performed by: INTERNAL MEDICINE

## 2017-12-07 RX ORDER — ZOLPIDEM TARTRATE 10 MG/1
TABLET ORAL
Qty: 1 TABLET | Refills: 0 | Status: SHIPPED | OUTPATIENT
Start: 2017-12-07 | End: 2018-12-18

## 2017-12-07 NOTE — PATIENT INSTRUCTIONS
"MY TREATMENT INFORMATION FOR SLEEP DISTURBANCE-  Ludwin Massey    DOCTOR : [facility]  SLEEP CENTER :  [place]  MY CONTACT NUMBER:[phone]        If I haven't had a sleep study yet, what can I expect?  A personal story from Devyn  [email or web address].com/watch?v=[email or web address]      Suspected sleep apnea: Sleep study ordered.    Follow up in sleep clinic 1-2 weeks after sleep study to discuss results of sleep study and treatment options.    Patient was advised not to drive if drowsy or sleepy.    Frequently asked questions:  1. What is Obstructive Sleep Apnea (CIARRA)? CIARRA is the most common type of sleep apnea. Apnea literally means, \"without breath.\" It is characterized by repetitive pauses in breathing, despite continued effort to breathe, and is usually associated with a reduction in blood oxygen saturation. Apneas can last 10 to over 60 seconds. It is caused by narrowing or collapse of the upper airway as muscles relax during sleep. Severity of sleep apnea is determined by frequency of breathing events and their effect on your sleep and oxygen levels determined during sleep testing.   2. What are the consequences of CIARRA? Symptoms include: daytime sleepiness- possibly increasing the risk of falling asleep while driving, unrefreshing/restless sleep, snoring, insomnia, waking frequently to urinate, waking with heartburn or reflux, reduced concentration and memory, and morning headaches. Other health consequences may include development of high blood pressure and other cardiovascular disease in persons who are susceptible. Untreated CIARRA  can contribute to heart disease, stroke and diabetes.   3. What are the treatment options? In most situations, sleep apnea is a lifelong disease that must be managed with daily therapy. Medications are not effective for sleep apnea and surgery is generally not performed until other therapies have been tried. Therapy is usually tailored to the individual patient based on many " factors including your wishes as well as severity of sleep apnea and severity of obesity. Continuous Positive Airway (CPAP) is the most reliable treatment. An oral device to hold your jaw forward is usually the next most reliable option. Other options include postioning devices (to keep you off your back), weight loss, and surgery including a tongue pacing device. There is more detail about some of these options below.            1. CPAP-  WHAT DOES IT DO AND HOW CAN I LEARN TO WEAR IT?                               BEFORE I START, CAN I WATCH A MOVIE TO GET A PLAN ON HOW TO USE CPAP?  https://www.Autoniq.com/watch?f=q5C38nc921W      Continuous positive airway pressure, or CPAP, is the most effective treatment for obstructive sleep apnea. It works by blowing room air, through a mask, to hold your throat open. A decision to use CPAP is a major step forward in the pursuit of a healthier life. The successful use of CPAP will help you breathe easier, sleep better and live healthier. You can choose CPAP equipment from any durable medical equipment provider that meets your needs.  Using CPAP can be a positive experience if you keep these briscoe points in mind:  1. Commitment  CPAP is not a quick fix for your problem. It involves a long-term commitment to improve your sleep and your health.    2. Communication  Stay in close communication with both your sleep doctor and your CPAP supplier. Ask lots of questions and seek help when you need it.    3. Consistency  Use CPAP all night, every night and for every nap. You will receive the maximum health benefits from CPAP when you use it every time that you sleep. This will also make it easier for your body to adjust to the treatment.    4. Correction  The first machine and mask that you try may not be the best ones for you. Work with your sleep doctor and your CPAP supplier to make corrections to your equipment selection. Ask about trying a different type of machine or mask if you  "have ongoing problems. Make sure that your mask is a good fit and learn to use your equipment properly.    5. Challenge  Tell a family member or close friend to ask you each morning if you used your CPAP the previous night. Have someone to challenge you to give it your best effort.    6. Connection   Your adjustment to CPAP will be easier if you are able to connect with others who use the same treatment. Ask your sleep doctor if there is a support group in your area for people who have sleep apnea, or look for one on the Internet.  7. Comfort   Increase your level of comfort by using a saline spray, decongestant or heated humidifier if CPAP irritates your nose, mouth or throat. Use your unit's \"ramp\" setting to slowly get used to the air pressure level. There may be soft pads you can buy that will fit over your mask straps. Look on www.CPAP.com for accessories that can help make CPAP use more comfortable.  8. Cleaning   Clean your mask, tubing and headgear on a regular basis. Put this time in your schedule so that you don't forget to do it. Check and replace the filters for your CPAP unit and humidifier.    9. Completion   Although you are never finished with CPAP therapy, you should reward yourself by celebrating the completion of your first month of treatment. Expect this first month to be your hardest period of adjustment. It will involve some trial and error as you find the machine, mask and pressure settings that are right for you.    10. Continuation  After your first month of treatment, continue to make a daily commitment to use your CPAP all night, every night and for every nap.    CPAP-Tips to starting with success:  Begin using your CPAP for short periods of time during the day while you watch TV or read.    Use CPAP every night and for every nap. Using it less often reduces the health benefits and makes it harder for your body to get used to it.    Make small adjustments to your mask, tubing, straps and " headgear until you get the right fit. Tightening the mask may actually worsen the leak.  If it leaves significant marks on your face or irritates the bridge of your nose, it may not be the best mask for you.  Speak with the person who supplied the mask and consider trying other masks. Insurances will allow you to try different masks during the first month of starting CPAP.  Insurance also covers a new mask, hose and filter about every 6 months.    Use a saline nasal spray to ease mild nasal congestion. Neti-Pot or saline nasal rinses may also help. Nasal gel sprays can help reduce nasal dryness.  Biotene mouthwash can be helpful to protect your teeth if you experience frequent dry mouth.  Dry mouth may be a sign of air escaping out of your mouth or out of the mask in the case of a full face mask.  Speak with your provider if you expect that is the case.     Take a nasal decongestant to relieve more severe nasal or sinus congestion.  Do not use Afrin (oxymetazoline) nasal spray more than 3 days in a row.  Speak with your sleep doctor if your nasal congestion is chronic.    Use a heated humidifier that fits your CPAP model to enhance your breathing comfort. Adjust the heat setting up if you get a dry nose or throat, down if you get condensation in the hose or mask.  Position the CPAP lower than you so that any condensation in the hose drains back into the machine rather than towards the mask.    Try a system that uses nasal pillows if traditional masks give you problems.    Clean your mask, tubing and headgear once a week. Make sure the equipment dries fully.    Regularly check and replace the filters for your CPAP unit and humidifier.    Work closely with your sleep provider and your CPAP supplier to make sure that you have the machine, mask and air pressure setting that works best for you. It is better to stop using it and call your provider to solve problems than to lay awake all night frustrated with the  device.    BESIDES CPAP, WHAT OTHER THERAPIES ARE THERE?      Positioning Device  Positioning devices are generally used when sleep apnea is mild and only occurs on your back.This example shows a pillow that straps around the waist. It may be appropriate for those whose sleep study shows milder sleep apnea that occurs primarily when lying flat on one's back. Preliminary studies have shown benefit but effectiveness at home may need to be verified by a home sleep test. These devices are generally not covered by medical insurance.                      Oral Appliance  What is oral appliance therapy?  An oral appliance is a small acrylic device that fits over the upper and lower teeth or tongue (similar to an orthodontic retainer or a mouth guard). This device slightly advances the lower jaw or tongue, which moves the base of the tongue forward, opens the airway, improves breathing and can effectively treat snoring and obstructive sleep apnea sleep apnea. The appliance is fabricated and customized by a qualified dentist with experience in treating snoring and sleep apnea. Oral appliances are usually well tolerated and have relatively high compliance by patients1, 2, 3.  When is an oral appliance indicated?  Oral appliance therapy is recommended as a first-line treatment for patients with primary snoring, mild sleep apnea, and for patients with moderate sleep apnea who prefer appliance therapy to use of CPAP4, 5. Severity of sleep apnea is determined by sleep testing and is based on the number of respiratory events per hour of sleep.   How successful is oral appliance therapy?  The success rate of oral appliance therapy in patients with mild sleep apnea is 75-80% while in patients with moderate sleep apnea it is 50-70%. The chance of success in patients with severe sleep apnea is 40-50%. The research also shows that oral appliances have a beneficial effect on the cardiovascular health of CIARRA patients at the same magnitude  as CPAP therapy7.  Oral appliances should be a second-line treatment in cases of severe sleep apnea, but if not completely successful then a combination therapy utilizing CPAP plus oral appliance therapy may be effective. Oral appliances tend to be effective in a broad range of patients although studies show that the patients who have the highest success are females, younger patients, those with milder disease, and less severe obesity. 3, 6.   The chances of success are lower in patients who have more severe CIARRA, are older, and those who are morbidly obese.     Example of an oral appliance   Finding a dentist that practices dental sleep medicine  Specific training is available through the American Academy of Dental Sleep Medicine for dentists interested in working in the field of sleep. To find a dentist who is educated in the field of sleep and the use of oral appliances, near you, visit the Web site of the American Academy of Dental Sleep Medicine; also see   http://www.accpstorage.org/newOrganization/patients/oralAppliances.pdf  To search for a dentist certified in these practices:  Http://aadsm.org/FindADentist.aspx?1  1. Kacie et al. Objectively measured vs self-reported compliance during oral appliance therapy for sleep-disordered breathing. Chest 2013; 144(5): 2170-8492.  2. Daja, et al. Objective measurement of compliance during oral appliance therapy for sleep-disordered breathing. Thorax 2013; 68(1): 91-96.  3. Dunia, et al. Mandibular advancement devices in 620 men and women with CIARRA and snoring: tolerability and predictors of treatment success. Chest 2004; 125: 8547-6904.  4. Bri, et al. Oral appliances for snoring and CIARRA: a review. Sleep 2006; 29: 244-262.  5. Keaton, et al. Oral appliance treatment for CIARRA: an update. J Clin Sleep Med 2014; 10(2): 215-227.  6. Michael et al. Predictors of OSAH treatment outcome. J Dent Res 2007; 86: 3327-7894.    Nasal Valves                  Nasal valves may not be effective if you have frequent nasal congestion or have difficulty breathing through your nose. They may be an option for mild apnea if other options are not well tolerated. The efficacy of these devices is generally less than CPAP or oral appliances.      Weight Loss:    Weight management is a personal decision.  If you are interested in exploring weight loss strategies, the following discussion covers the impact on weight loss on sleep apnea and the approaches that may be successful.    Weight loss decreases severity of sleep apnea in most people with obesity. For those with mild obesity who have developed snoring with weight gain, even 15-30 pound weight loss can improve and occasionally eliminate sleep apnea.  Structured and life-long dietary and health habits are necessary to lose weight and keep healthier weight levels.     Though there may be significant health benefits from weight loss, long-term weight loss is very difficult to achieve- studies show success with dietary management in less than 10% of people. In addition, substantial weight loss may require years of dietary control and may be difficult if patients have severe obesity. In these cases, surgical management may be considered.  Finally, older individuals who have tolerated obesity without health complications may be less likely to benefit from weight loss strategies.    Your BMI is Body mass index is 24.65 kg/(m^2).  Body mass index (BMI) is one way to tell whether you are at a healthy weight, overweight, or obese. It measures your weight in relation to your height.  A BMI of 18.5 to 24.9 is in the healthy range. A person with a BMI of 25 to 29.9 is considered overweight, and someone with a BMI of 30 or greater is considered obese. More than two-thirds of American adults are considered overweight or obese.  Being overweight or obese increases the risk for further weight gain. Excess weight may lead to heart disease and  diabetes.  Creating and following plans for healthy eating and physical activity may help you improve your health.  Weight control is part of healthy lifestyle and includes exercise, emotional health, and healthy eating habits. Careful eating habits lifelong are the mainstay of weight control. Though there are significant health benefits from weight loss, long-term weight loss with diet alone may be very difficult to achieve- studies show long-term success with dietary management in less than 10% of people. Attaining a healthy weight may be especially difficult to achieve in those with severe obesity. In some cases, medications, devices and surgical management might be considered.  What can you do?  If you are overweight or obese and are interested in methods for weight loss, you should discuss this with your provider.     Consider reducing daily calorie intake by 500 calories.     Keep a food journal.     Avoiding skipping meals, consider cutting portions instead.    Diet combined with exercise helps maintain muscle while optimizing fat loss. Strength training is particularly important for building and maintaining muscle mass. Exercise helps reduce stress, increase energy, and improves fitness. Increasing exercise without diet control, however, may not burn enough calories to loose weight.       Start walking three days a week 10-20 minutes at a time    Work towards walking thirty minutes five days a week     Eventually, increase the speed of your walking for 1-2 minutes at time    In addition, we recommend that you review healthy lifestyles and methods for weight loss available through the National Institutes of Health patient information sites:  http://win.niddk.nih.gov/publications/index.htm    And look into health and wellness programs that may be available through your health insurance provider, employer, local community center, or cristino club.        Surgery:    Upper Airway Surgery for CIARRA  Surgery for CIARRA is  a second-line treatment option in the management of sleep apnea.  Surgery should be considered for patients who are having a difficult time tolerating CPAP.    Surgery for CIARRA is directed at areas that are responsible for narrowing or complete obstruction of the airway during sleep.  There are a wide range of procedures available to enlarge and/or stabilize the airway to prevent blockage of breathing in the three major areas where it can occur: the palate, tongue, and nasal regions.  Successful surgical treatment depends on the accurate identification of the factors responsible for obstructive sleep apnea in each person.  A personalized approach is required because there is no single treatment that works well for everyone.  Because of anatomic variation, consultation with an examination by a sleep surgeon is a critical first step in determining what surgical options are best for each patient.  In some cases, examination during sedation may be recommended in order to guide the selection of procedures.  Patients will be counseled about risks and benefits as well as the typical recovery course after surgery. Surgery is typically not a cure for a person s CIARRA.  However, surgery will often significantly improve one s CIARRA severity (termed  success rate ).  Even in the absence of a cure, surgery will decrease the cardiovascular risk associated with OSA7; improve overall quality of life8 (sleepiness, functionality, sleep quality, etc).          Palate Procedures:  Patients with CIARRA often have narrowing of their airway in the region of their tonsils and uvula.  The goals of palate procedures are to widen the airway in this region as well as to help the tissues resist collapse.  Modern palate procedure techniques focus on tissue conservation and soft tissue rearrangement, rather than tissue removal.  Often the uvula is preserved in this procedure. Residual sleep apnea is common in patient after pharyngoplasty with an average  reduction in sleep apnea events of 33%2.      Tongue Procedures:  While patients are awake, the muscles that surround the throat are active and keep this region open for breathing. These muscles relax during sleep, allowing the tongue and other structures to collapse and block breathing.  There are several different tongue procedures available.  Selection of a tongue base procedure depends on characteristics seen on physical exam.  Generally, procedures are aimed at removing bulky tissues in this area or preventing the back of the tongue from falling back during sleep.  Success rates for tongue surgery range from 50-62%3.    Hypoglossal Nerve Stimulation:  Hypoglossal nerve stimulation has recently received approval from the [place] Food and Drug Administration for the treatment of obstructive sleep apnea.  This is based on research showing that the system was safe and effective in treating sleep apnea6.  Results showed that the median AHI score decreased 68%, from 29.3 to 9.0. This therapy uses an implant system that senses breathing patterns and delivers mild stimulation to airway muscles, which keeps the airway open during sleep.  The system consists of three fully implanted components: a small generator (similar in size to a pacemaker), a breathing sensor, and a stimulation lead.  Using a small handheld remote, a patient turns the therapy on before bed and off upon awakening.    Candidates for this device must be greater than 22 years of age, have moderate to severe CIARRA (AHI between 20-65), BMI less than 32, have tried CPAP/oral appliance without success, and have appropriate upper airway anatomy (determined by a sleep endoscopy performed by Dr. Sequeira).    Hypoglossal Nerve Stimulation Pathway:    The sleep surgeon s office will work with the patient through the insurance prior-authorization process (including communications and appeals).    Nasal Procedures:  Nasal obstruction can interfere with nasal  breathing during the day and night.  Studies have shown that relief of nasal obstruction can improve the ability of some patients to tolerate positive airway pressure therapy for obstructive sleep apnea1.  Treatment options include medications such as nasal saline, topical corticosteroid and antihistamine sprays, and oral medications such as antihistamines or decongestants. Non-surgical treatments can include external nasal dilators for selected patients. If these are not successful by themselves, surgery can improve the nasal airway either alone or in combination with these other options.      Combination Procedures:  Combination of surgical procedures and other treatments may be recommended, particularly if patients have more than one area of narrowing or persistent positional disease.  The success rate of combination surgery ranges from 66-80%2,3.      1. Lisa CASTLE. The Role of the Nose in Snoring and Obstructive Sleep Apnoea: An Update.  Eur Arch Otorhinolaryngol. 2011; 268: 1365-73.  2.  Анна SM; Donya JA; Riley JR; Pallanch JF; Wesley MB; Deena SG; Prabhu JEONG. Surgical modifications of the upper airway for obstructive sleep apnea in adults: a systematic review and meta-analysis. SLEEP 2010;33(10):5353-0158. Rakesh ALBERTO. Hypopharyngeal surgery in obstructive sleep apnea: an evidence-based medicine review.  Arch Otolaryngol Head Neck Surg. 2006 Feb;132(2):206-13.  3. Josh YH1, Glenis Y, Joao JASON. The efficacy of anatomically based multilevel surgery for obstructive sleep apnea. Otolaryngol Head Neck Surg. 2003 Oct;129(4):327-35.  4. Rakesh ALBERTO, Goldberg A. Hypopharyngeal Surgery in Obstructive Sleep Apnea: An Evidence-Based Medicine Review. Arch Otolaryngol Head Neck Surg. 2006 Feb;132(2):206-13.  5. Radha OSORIO et al. Upper-Airway Stimulation for Obstructive Sleep Apnea.  N Engl J Med. 2014 Jan 9;370(2):139-49.  6. Italo Y et al. Increased Incidence of Cardiovascular Disease in Middle-aged Men with  Obstructive Sleep Apnea. Am J Respir Crit Care Med; 2002 166: 159-165  7. Wendy CANO et al. Studying Life Effects and Effectiveness of Palatopharyngoplasty (SLEEP) study: Subjective Outcomes of Isolated Uvulopalatopharyngoplasty. Otolaryngol Head Neck Surg. 2011; 144: 623-631.  8.   Your blood pressure was checked while you were in clinic today.  Please read the guidelines below about what these numbers mean and what you should do about them.  Your systolic blood pressure is the top number.  This is the pressure when the heart is pumping.  Your diastolic blood pressure is the bottom number.  This is the pressure in between beats.  If your systolic blood pressure is less than 120 and your diastolic blood pressure is less than 80, then your blood pressure is normal. There is nothing more that you need to do about it  If your systolic blood pressure is 120-139 or your diastolic blood pressure is 80-89, your blood pressure may be higher than it should be.  You should have your blood pressure re-checked within a year by a primary care provider.  If your systolic blood pressure is 140 or greater or your diastolic blood pressure is 90 or greater, you may have high blood pressure.  High blood pressure is treatable, but if left untreated over time it can put you at risk for heart attack, stroke, or kidney failure.  You should have your blood pressure re-checked by a primary care provider within the next four weeks.

## 2017-12-07 NOTE — NURSING NOTE
"Chief Complaint   Patient presents with     Consult     sometimes wakes up feeling short of breath.  great family hx of CIARRA, occasional snoring, high blood pressure, takes 35-40 minutes to fall asleep, at times wakes up in a panic stilll dreaming       Initial /88  Pulse 88  Resp 10  Ht 1.753 m (5' 9.02\")  Wt 75.8 kg (167 lb)  SpO2 100%  BMI 24.65 kg/m2 Estimated body mass index is 24.65 kg/(m^2) as calculated from the following:    Height as of this encounter: 1.753 m (5' 9.02\").    Weight as of this encounter: 75.8 kg (167 lb).  Medication Reconciliation: complete     ESS 9  Neck Circumference 36 cm, 14.25 inches  Haily Gupta CNA, Sleep Clinic specialist  "

## 2017-12-07 NOTE — MR AVS SNAPSHOT
"              After Visit Summary   12/7/2017    Ludwin Massey    MRN: [id number]           Patient Information     Date Of Birth          1990        Visit Information        Provider Department      12/7/2017 9:00 AM Tim Solorio MD [facility]        Today's Diagnoses     Sleep disturbance    -  1    S/P closure of congenital atrial septal defect by percutaneous transcatheter technique        Congenital cleft leaflet of mitral valve        ASD (atrial septal defect), ostium primum s/p repair         Hypertension, unspecified type        Dream enactment behavior          Care Instructions    MY TREATMENT INFORMATION FOR SLEEP DISTURBANCE-  Ludwin Massey    DOCTOR : Tim Solorio  SLEEP [facility] :  [facility]  MY CONTACT NUMBER:[phone]        If I haven't had a sleep study yet, what can I expect?  A personal story from [employer]  https://www.[employer].com/watch?v=AxPLmlRpnCs      Suspected sleep apnea: Sleep study ordered.    Follow up in sleep clinic 1-2 weeks after sleep study to discuss results of sleep study and treatment options.    Patient was advised not to drive if drowsy or sleepy.    Frequently asked questions:  1. What is Obstructive Sleep Apnea (CIARRA)? CIARRA is the most common type of sleep apnea. Apnea literally means, \"without breath.\" It is characterized by repetitive pauses in breathing, despite continued effort to breathe, and is usually associated with a reduction in blood oxygen saturation. Apneas can last 10 to over 60 seconds. It is caused by narrowing or collapse of the upper airway as muscles relax during sleep. Severity of sleep apnea is determined by frequency of breathing events and their effect on your sleep and oxygen levels determined during sleep testing.   2. What are the consequences of CIARRA? Symptoms include: daytime sleepiness- possibly increasing the risk of falling asleep while driving, unrefreshing/restless sleep, snoring, insomnia, waking " frequently to urinate, waking with heartburn or reflux, reduced concentration and memory, and morning headaches. Other health consequences may include development of high blood pressure and other cardiovascular disease in persons who are susceptible. Untreated CIARRA  can contribute to heart disease, stroke and diabetes.   3. What are the treatment options? In most situations, sleep apnea is a lifelong disease that must be managed with daily therapy. Medications are not effective for sleep apnea and surgery is generally not performed until other therapies have been tried. Therapy is usually tailored to the individual patient based on many factors including your wishes as well as severity of sleep apnea and severity of obesity. Continuous Positive Airway (CPAP) is the most reliable treatment. An oral device to hold your jaw forward is usually the next most reliable option. Other options include postioning devices (to keep you off your back), weight loss, and surgery including a tongue pacing device. There is more detail about some of these options below.            1. CPAP-  WHAT DOES IT DO AND HOW CAN I LEARN TO WEAR IT?                               BEFORE I START, CAN I WATCH A MOVIE TO GET A PLAN ON HOW TO USE CPAP?  https://www.[employer].com/watch?x=d9Z50cq311Q      Continuous positive airway pressure, or CPAP, is the most effective treatment for obstructive sleep apnea. It works by blowing room air, through a mask, to hold your throat open. A decision to use CPAP is a major step forward in the pursuit of a healthier life. The successful use of CPAP will help you breathe easier, sleep better and live healthier. You can choose CPAP equipment from any durable medical equipment provider that meets your needs.  Using CPAP can be a positive experience if you keep these briscoe points in mind:  1. Commitment  CPAP is not a quick fix for your problem. It involves a long-term commitment to improve your sleep and your  "health.    2. Communication  Stay in close communication with both your sleep doctor and your CPAP supplier. Ask lots of questions and seek help when you need it.    3. Consistency  Use CPAP all night, every night and for every nap. You will receive the maximum health benefits from CPAP when you use it every time that you sleep. This will also make it easier for your body to adjust to the treatment.    4. Correction  The first machine and mask that you try may not be the best ones for you. Work with your sleep doctor and your CPAP supplier to make corrections to your equipment selection. Ask about trying a different type of machine or mask if you have ongoing problems. Make sure that your mask is a good fit and learn to use your equipment properly.    5. Challenge  Tell a family member or close friend to ask you each morning if you used your CPAP the previous night. Have someone to challenge you to give it your best effort.    6. Connection   Your adjustment to CPAP will be easier if you are able to connect with others who use the same treatment. Ask your sleep doctor if there is a support group in your area for people who have sleep apnea, or look for one on the Internet.  7. Comfort   Increase your level of comfort by using a saline spray, decongestant or heated humidifier if CPAP irritates your nose, mouth or throat. Use your unit's \"ramp\" setting to slowly get used to the air pressure level. There may be soft pads you can buy that will fit over your mask straps. Look on www.CPAP.com for accessories that can help make CPAP use more comfortable.  8. Cleaning   Clean your mask, tubing and headgear on a regular basis. Put this time in your schedule so that you don't forget to do it. Check and replace the filters for your CPAP unit and humidifier.    9. Completion   Although you are never finished with CPAP therapy, you should reward yourself by celebrating the completion of your first month of treatment. Expect this " first month to be your hardest period of adjustment. It will involve some trial and error as you find the machine, mask and pressure settings that are right for you.    10. Continuation  After your first month of treatment, continue to make a daily commitment to use your CPAP all night, every night and for every nap.    CPAP-Tips to starting with success:  Begin using your CPAP for short periods of time during the day while you watch TV or read.    Use CPAP every night and for every nap. Using it less often reduces the health benefits and makes it harder for your body to get used to it.    Make small adjustments to your mask, tubing, straps and headgear until you get the right fit. Tightening the mask may actually worsen the leak.  If it leaves significant marks on your face or irritates the bridge of your nose, it may not be the best mask for you.  Speak with the person who supplied the mask and consider trying other masks. Insurances will allow you to try different masks during the first month of starting CPAP.  Insurance also covers a new mask, hose and filter about every 6 months.    Use a saline nasal spray to ease mild nasal congestion. Neti-Pot or saline nasal rinses may also help. Nasal gel sprays can help reduce nasal dryness.  Biotene mouthwash can be helpful to protect your teeth if you experience frequent dry mouth.  Dry mouth may be a sign of air escaping out of your mouth or out of the mask in the case of a full face mask.  Speak with your provider if you expect that is the case.     Take a nasal decongestant to relieve more severe nasal or sinus congestion.  Do not use Afrin (oxymetazoline) nasal spray more than 3 days in a row.  Speak with your sleep doctor if your nasal congestion is chronic.    Use a heated humidifier that fits your CPAP model to enhance your breathing comfort. Adjust the heat setting up if you get a dry nose or throat, down if you get condensation in the hose or mask.  Position  the CPAP lower than you so that any condensation in the hose drains back into the machine rather than towards the mask.    Try a system that uses nasal pillows if traditional masks give you problems.    Clean your mask, tubing and headgear once a week. Make sure the equipment dries fully.    Regularly check and replace the filters for your CPAP unit and humidifier.    Work closely with your sleep provider and your CPAP supplier to make sure that you have the machine, mask and air pressure setting that works best for you. It is better to stop using it and call your provider to solve problems than to lay awake all night frustrated with the device.    BESIDES CPAP, WHAT OTHER THERAPIES ARE THERE?      Positioning Device  Positioning devices are generally used when sleep apnea is mild and only occurs on your back.This example shows a pillow that straps around the waist. It may be appropriate for those whose sleep study shows milder sleep apnea that occurs primarily when lying flat on one's back. Preliminary studies have shown benefit but effectiveness at home may need to be verified by a home sleep test. These devices are generally not covered by medical insurance.                      Oral Appliance  What is oral appliance therapy?  An oral appliance is a small acrylic device that fits over the upper and lower teeth or tongue (similar to an orthodontic retainer or a mouth guard). This device slightly advances the lower jaw or tongue, which moves the base of the tongue forward, opens the airway, improves breathing and can effectively treat snoring and obstructive sleep apnea sleep apnea. The appliance is fabricated and customized by a qualified dentist with experience in treating snoring and sleep apnea. Oral appliances are usually well tolerated and have relatively high compliance by patients1, 2, 3.  When is an oral appliance indicated?  Oral appliance therapy is recommended as a first-line treatment for patients with  primary snoring, mild sleep apnea, and for patients with moderate sleep apnea who prefer appliance therapy to use of CPAP4, 5. Severity of sleep apnea is determined by sleep testing and is based on the number of respiratory events per hour of sleep.   How successful is oral appliance therapy?  The success rate of oral appliance therapy in patients with mild sleep apnea is 75-80% while in patients with moderate sleep apnea it is 50-70%. The chance of success in patients with severe sleep apnea is 40-50%. The research also shows that oral appliances have a beneficial effect on the cardiovascular health of CIARRA patients at the same magnitude as CPAP therapy7.  Oral appliances should be a second-line treatment in cases of severe sleep apnea, but if not completely successful then a combination therapy utilizing CPAP plus oral appliance therapy may be effective. Oral appliances tend to be effective in a broad range of patients although studies show that the patients who have the highest success are females, younger patients, those with milder disease, and less severe obesity. 3, 6.   The chances of success are lower in patients who have more severe CIARRA, are older, and those who are morbidly obese.     Example of an oral appliance   Finding a dentist that practices dental sleep medicine  Specific training is available through the American Academy of Dental Sleep Medicine for dentists interested in working in the field of sleep. To find a dentist who is educated in the field of sleep and the use of oral appliances, near you, visit the Web site of the American Academy of Dental Sleep Medicine; also see   http://www.accpstorage.org/newOrganization/patients/oralAppliances.pdf  To search for a dentist certified in these practices:  Http://aadsm.org/FindADentist.aspx?1  1. Kacie et al. Objectively measured vs self-reported compliance during oral appliance therapy for sleep-disordered breathing. Chest 2013; 144(5):  5193-4306.  2. Daja et al. Objective measurement of compliance during oral appliance therapy for sleep-disordered breathing. Thorax 2013; 68(1): 91-96.  3. Dunia et al. Mandibular advancement devices in 620 men and women with CIARRA and snoring: tolerability and predictors of treatment success. Chest 2004; 125: 7471-7306.  4. Bri, et al. Oral appliances for snoring and CIARRA: a review. Sleep 2006; 29: 244-262.  5. Keaton et al. Oral appliance treatment for CIARRA: an update. J Clin Sleep Med 2014; 10(2): 215-227.  6. Michael et al. Predictors of OSAH treatment outcome. J Dent Res 2007; 86: 7143-6240.    Nasal Valves                 Nasal valves may not be effective if you have frequent nasal congestion or have difficulty breathing through your nose. They may be an option for mild apnea if other options are not well tolerated. The efficacy of these devices is generally less than CPAP or oral appliances.      Weight Loss:    Weight management is a personal decision.  If you are interested in exploring weight loss strategies, the following discussion covers the impact on weight loss on sleep apnea and the approaches that may be successful.    Weight loss decreases severity of sleep apnea in most people with obesity. For those with mild obesity who have developed snoring with weight gain, even 15-30 pound weight loss can improve and occasionally eliminate sleep apnea.  Structured and life-long dietary and health habits are necessary to lose weight and keep healthier weight levels.     Though there may be significant health benefits from weight loss, long-term weight loss is very difficult to achieve- studies show success with dietary management in less than 10% of people. In addition, substantial weight loss may require years of dietary control and may be difficult if patients have severe obesity. In these cases, surgical management may be considered.  Finally, older individuals who have tolerated obesity  without health complications may be less likely to benefit from weight loss strategies.    Your BMI is Body mass index is 24.65 kg/(m^2).  Body mass index (BMI) is one way to tell whether you are at a healthy weight, overweight, or obese. It measures your weight in relation to your height.  A BMI of 18.5 to 24.9 is in the healthy range. A person with a BMI of 25 to 29.9 is considered overweight, and someone with a BMI of 30 or greater is considered obese. More than two-thirds of American adults are considered overweight or obese.  Being overweight or obese increases the risk for further weight gain. Excess weight may lead to heart disease and diabetes.  Creating and following plans for healthy eating and physical activity may help you improve your health.  Weight control is part of healthy lifestyle and includes exercise, emotional health, and healthy eating habits. Careful eating habits lifelong are the mainstay of weight control. Though there are significant health benefits from weight loss, long-term weight loss with diet alone may be very difficult to achieve- studies show long-term success with dietary management in less than 10% of people. Attaining a healthy weight may be especially difficult to achieve in those with severe obesity. In some cases, medications, devices and surgical management might be considered.  What can you do?  If you are overweight or obese and are interested in methods for weight loss, you should discuss this with your provider.     Consider reducing daily calorie intake by 500 calories.     Keep a food journal.     Avoiding skipping meals, consider cutting portions instead.    Diet combined with exercise helps maintain muscle while optimizing fat loss. Strength training is particularly important for building and maintaining muscle mass. Exercise helps reduce stress, increase energy, and improves fitness. Increasing exercise without diet control, however, may not burn enough calories to  loose weight.       Start walking three days a week 10-20 minutes at a time    Work towards walking thirty minutes five days a week     Eventually, increase the speed of your walking for 1-2 minutes at time    In addition, we recommend that you review healthy lifestyles and methods for weight loss available through the National Institutes of Health patient information sites:  http://win.niddk.nih.gov/publications/index.htm    And look into health and wellness programs that may be available through your health insurance provider, employer, local community center, or cristino club.        Surgery:    Upper Airway Surgery for CIARRA  Surgery for CIARRA is a second-line treatment option in the management of sleep apnea.  Surgery should be considered for patients who are having a difficult time tolerating CPAP.    Surgery for CIARRA is directed at areas that are responsible for narrowing or complete obstruction of the airway during sleep.  There are a wide range of procedures available to enlarge and/or stabilize the airway to prevent blockage of breathing in the three major areas where it can occur: the palate, tongue, and nasal regions.  Successful surgical treatment depends on the accurate identification of the factors responsible for obstructive sleep apnea in each person.  A personalized approach is required because there is no single treatment that works well for everyone.  Because of anatomic variation, consultation with an examination by a sleep surgeon is a critical first step in determining what surgical options are best for each patient.  In some cases, examination during sedation may be recommended in order to guide the selection of procedures.  Patients will be counseled about risks and benefits as well as the typical recovery course after surgery. Surgery is typically not a cure for a person s CIARRA.  However, surgery will often significantly improve one s CIARRA severity (termed  success rate ).  Even in the absence of a  cure, surgery will decrease the cardiovascular risk associated with OSA7; improve overall quality of life8 (sleepiness, functionality, sleep quality, etc).          Palate Procedures:  Patients with CIARRA often have narrowing of their airway in the region of their tonsils and uvula.  The goals of palate procedures are to widen the airway in this region as well as to help the tissues resist collapse.  Modern palate procedure techniques focus on tissue conservation and soft tissue rearrangement, rather than tissue removal.  Often the uvula is preserved in this procedure. Residual sleep apnea is common in patient after pharyngoplasty with an average reduction in sleep apnea events of 33%2.      Tongue Procedures:  While patients are awake, the muscles that surround the throat are active and keep this region open for breathing. These muscles relax during sleep, allowing the tongue and other structures to collapse and block breathing.  There are several different tongue procedures available.  Selection of a tongue base procedure depends on characteristics seen on physical exam.  Generally, procedures are aimed at removing bulky tissues in this area or preventing the back of the tongue from falling back during sleep.  Success rates for tongue surgery range from 50-62%3.    Hypoglossal Nerve Stimulation:  Hypoglossal nerve stimulation has recently received approval from the [place] Food and Drug Administration for the treatment of obstructive sleep apnea.  This is based on research showing that the system was safe and effective in treating sleep apnea6.  Results showed that the median AHI score decreased 68%, from 29.3 to 9.0. This therapy uses an implant system that senses breathing patterns and delivers mild stimulation to airway muscles, which keeps the airway open during sleep.  The system consists of three fully implanted components: a small generator (similar in size to a pacemaker), a breathing sensor, and a  stimulation lead.  Using a small handheld remote, a patient turns the therapy on before bed and off upon awakening.    Candidates for this device must be greater than 22 years of age, have moderate to severe CIARRA (AHI between 20-65), BMI less than 32, have tried CPAP/oral appliance without success, and have appropriate upper airway anatomy (determined by a sleep endoscopy performed by Dr. Sequeiar).    Hypoglossal Nerve Stimulation Pathway:    The sleep surgeon s office will work with the patient through the insurance prior-authorization process (including communications and appeals).    Nasal Procedures:  Nasal obstruction can interfere with nasal breathing during the day and night.  Studies have shown that relief of nasal obstruction can improve the ability of some patients to tolerate positive airway pressure therapy for obstructive sleep apnea1.  Treatment options include medications such as nasal saline, topical corticosteroid and antihistamine sprays, and oral medications such as antihistamines or decongestants. Non-surgical treatments can include external nasal dilators for selected patients. If these are not successful by themselves, surgery can improve the nasal airway either alone or in combination with these other options.      Combination Procedures:  Combination of surgical procedures and other treatments may be recommended, particularly if patients have more than one area of narrowing or persistent positional disease.  The success rate of combination surgery ranges from 66-80%2,3.      1. Lisa CASTLE. The Role of the Nose in Snoring and Obstructive Sleep Apnoea: An Update.  Eur Arch Otorhinolaryngol. 2011; 268: 1365-73.  2.  Анна SM; Donya JA; Riley JR; Pallanch JF; Wesley MB; Deena SG; Prabhu JEONG. Surgical modifications of the upper airway for obstructive sleep apnea in adults: a systematic review and meta-analysis. SLEEP 2010;33(10):1543-8050. Rakesh ALBERTO. Hypopharyngeal surgery in obstructive sleep  apnea: an evidence-based medicine review.  Arch Otolaryngol Head Neck Surg. 2006 Feb;132(2):206-13.  3. Josh YH1, Glenis Y, Joao JASON. The efficacy of anatomically based multilevel surgery for obstructive sleep apnea. Otolaryngol Head Neck Surg. 2003 Oct;129(4):327-35.  4. Rakesh ALBERTO, Goldberg A. Hypopharyngeal Surgery in Obstructive Sleep Apnea: An Evidence-Based Medicine Review. Arch Otolaryngol Head Neck Surg. 2006 Feb;132(2):206-13.  5. Radha OSORIO et al. Upper-Airway Stimulation for Obstructive Sleep Apnea.  N Engl J Med. 2014 Jan 9;370(2):139-49.  6. Italo Y et al. Increased Incidence of Cardiovascular Disease in Middle-aged Men with Obstructive Sleep Apnea. Am J Respir Crit Care Med; 2002 166: 159-165  7. Wendy CANO et al. Studying Life Effects and Effectiveness of Palatopharyngoplasty (SLEEP) study: Subjective Outcomes of Isolated Uvulopalatopharyngoplasty. Otolaryngol Head Neck Surg. 2011; 144: 623-631.  8.   Your blood pressure was checked while you were in clinic today.  Please read the guidelines below about what these numbers mean and what you should do about them.  Your systolic blood pressure is the top number.  This is the pressure when the heart is pumping.  Your diastolic blood pressure is the bottom number.  This is the pressure in between beats.  If your systolic blood pressure is less than 120 and your diastolic blood pressure is less than 80, then your blood pressure is normal. There is nothing more that you need to do about it  If your systolic blood pressure is 120-139 or your diastolic blood pressure is 80-89, your blood pressure may be higher than it should be.  You should have your blood pressure re-checked within a year by a primary care provider.  If your systolic blood pressure is 140 or greater or your diastolic blood pressure is 90 or greater, you may have high blood pressure.  High blood pressure is treatable, but if left untreated over time it can put you at risk for heart attack,  "stroke, or kidney failure.  You should have your blood pressure re-checked by a primary care provider within the next four weeks.              Follow-ups after your visit        Future tests that were ordered for you today     Open Future Orders        Priority Expected Expires Ordered    Comprehensive Sleep Study Routine  6/5/2018 12/7/2017            Who to contact     If you have questions or need follow up information about today's clinic visit or your schedule please contact [facility] directly at [phone].  Normal or non-critical lab and imaging results will be communicated to you by [email or web address], letter or phone within 4 business days after the clinic has received the results. If you do not hear from us within 7 days, please contact the clinic through AirSense Wirelesst or phone. If you have a critical or abnormal lab result, we will notify you by phone as soon as possible.  Submit refill requests through Blue Badge Style or call your pharmacy and they will forward the refill request to us. Please allow 3 business days for your refill to be completed.          Additional Information About Your Visit        [email or web address] Information     Blue Badge Style gives you secure access to your electronic health record. If you see a primary care provider, you can also send messages to your care team and make appointments. If you have questions, please call your primary care clinic.  If you do not have a primary care provider, please call [phone] and they will assist you.        Care EveryWhere ID     This is your Care EveryWhere ID. This could be used by other organizations to access your [place] medical records  [id number]        Your Vitals Were     Pulse Respirations Height Pulse Oximetry BMI (Body Mass Index)       88 10 1.753 m (5' 9.02\") 100% 24.65 kg/m2        Blood Pressure from Last 3 Encounters:   12/07/17 154/88   12/03/17 110/70   11/09/17 146/90    Weight from Last 3 Encounters:   12/07/17 75.8 kg (167 lb) "   12/03/17 75.8 kg (167 lb)   11/09/17 76.2 kg (167 lb 14.4 oz)              We Performed the Following     SLEEP EVALUATION & MANAGEMENT REFERRAL - ADULT -[facility]  [phone] (Age 18 and up)        Primary Care Provider Office Phone # Fax #    DEBBIE Shen [phone]       [address] [facility] [address]  [facility] [address]        Equal Access to Services     BIPIN URIARTE : Hadii aad ku hadasho Soomaali, waaxda luqadaha, qaybta kaalmada adeegyada, waxay idiin hayaan adeeg kharash la'nirav lindo. So [facility] [phone].    ATENCIÓN: Si habla español, tiene a jones disposición servicios gratuitos de asistencia lingüística. [facility] [phone].    We comply with applicable federal civil rights laws and [place] laws. We do not discriminate on the basis of race, color, national origin, age, disability, sex, sexual orientation, or gender identity.            Thank you!     Thank you for choosing [facility]  for your care. Our goal is always to provide you with excellent care. Hearing back from our patients is one way we can continue to improve our services. Please take a few minutes to complete the written survey that you may receive in the mail after your visit with us. Thank you!             Your Updated Medication List - Protect others around you: Learn how to safely use, store and throw away your medicines at www.disposemymeds.org.          This list is accurate as of: 12/7/17  9:53 AM.  Always use your most recent med list.                   Brand Name Dispense Instructions for use Diagnosis    metoprolol 25 MG 24 hr tablet    TOPROL-XL    90 tablet    Take 1 tablet (25 mg) by mouth daily    S/P closure of congenital atrial septal defect by percutaneous transcatheter technique, Congenital cleft leaflet of mitral valve, ASD (atrial septal defect), ostium primum, Hypertension, unspecified type

## 2017-12-07 NOTE — PROGRESS NOTES
Sleep Center [facility]  Outpatient Sleep Medicine Consultation  December 7, 2017      Name: Ludwin Massey MRN# [id number]   Age: 27 year old YOB: 1990     Date of Consultation: December 7, 2017  Consultation is requested by: Rubina Rojo MD  [address]  [place] [address]  Primary care provider: Tamiko Flannery  [place] clinic:  [address] [facility] [address] [address]       Reason for Sleep Consult:     Ludwin Massey is a 27 year old male nightly snoring, gasping, choking, poor quality of sleep and excessive daytime sleepiness and tiredness         Assessment and Plan:     Summary Sleep Diagnoses/Recommendations:    1. Sleep Disturbance:  High suspicion of sleep disordered breathing based on patient's symptoms (snoring, excessive daytime sleepiness, witnessed apneas), oropharyngeal examination in setting of heart disease. Will schedule PSG with PAP titration in second half if patient meets criteria for CIARRA in first half of PSG with TCM CO2 and 4 limb montage. He is a risk of central apnea due to heart disease. We also discussed the pathophysiology of sleep disordered breathing and the importance of treating it if S/he should have it. Patient is advised not to drive if he/she feels drowsy or sleepy.  Follow up after sleep study to discuss the result of sleep study and treatment options.     2.  Dream enactment behavior may related to SDB.  Encourage good sleep hygiene, instructions given.  Reduce/avoid precipitating factors: sleep deprivation, alcohol use.  Environmental precautions is needed: mat under the bed, remove night stand and lamp. No guns at home. Add sleep study with 4 limb montage/RBD montage.    3.  Hypertension: elevated blood pressure may be related to untreated sleep apnea.  - Advised adherence to anti-hypertensive medications, if prescribed  - Recommend follow up with PCP as scheduled today.       Orders Placed This Encounter   Procedures      Comprehensive Sleep Study    Ambien 10 mg x1    Summary Counseling:  See instructions    Counseling included a comprehensive review of diagnostic and therapeutic strategies as well as risks of inadequate therapy.  Educational materials provided in instructions.    All questions were answered.  The patient indicates understanding of the above issues and agrees with the plan set forth.           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 I was asked to see Mr. Massey regarding sleep apnea by Dr. Rojo.   Patient complains snoring, holding breath and then waking up choking and gaping with air when he lays his back, nocturnal dyspnea and excessive daytime sleepiness and tiredness,high blood pressure. Patient wakes up confused and still in dreams, sleep talks. He has dry mouth, thirsty and morning headache.  Patient has vivid dreams 1-2 months and sleepwalking. He is confused in morning in panic situation. He acts out his dreams during those dreams. He has high BP and he did yesterday 24 hr BP ambulatory monitoring and BP was high all yesterday.    Please see below for sleep ROS details.    PREVIOUS IN- LAB or HOME SLEEP STUDIES:  None     SLEEP-WAKE SCHEDULE:     Ludwin Massey     -Describes themself as a morning person;      -ON WEEKDAYS, goes to sleep at 9:30 PM during the week; awakens  5:00 AM with an alarm; falls asleep in 30-45 minutes; denies difficulty falling asleep.     -ON WEEKENDS, goes to sleep at 10:00 PM and wakes up at 7:00 AM without an alarm; falls asleep in 30 minutes.       -Awakens 3-4 times a night for 15 minutes before falling back to sleep; awakens to uncertain reasons.      -Total sleep time: 7-8 hours per night.    -Naps 3-4  times/days per week for 30-45 minutes, feels refreshed after naps; takes no inadvertant naps.       BEDTIME ACTIVITIES AND SHIFT WORK:    Ludwin Massey    -does use electronics in bed and read in bed and does not watch TV in bed.     -does not do shift work.  He/she works day shifts.      Occupation: office work     SCALES       SLEEP APNEA: Stopbang score: 4       INSOMNIA:  Insomnia severity score: 7.5       SLEEPINESS: [place] sleepiness scale (ESS): 9   Drowsy driving/near accidents: No          PHQ9: N/A    SLEEP COMPLAINTS:   Snoring- 7 days/week  Witness apnea: Yes  Gasping/Choking: Yes  Excessive daytime sleep: Yes  Toss/turn: Yes  Excessive tiredness/fatigue:  Yes  Morning headaches: Yes  Dry mouth/throat: Yes  Dyspnea: Yes  Coexisting Lung disease: No    Coexisting Heart disease: Yes    Does patient have a bed partner: Yes  Has bed partner been sleeping separately because of snoring:  No            RLS Screen: When you try to relax in the evening or sleep at  night, do you ever have unpleasant, restless feelings in your  legs that can be relieved by walking or movement? No    Periodic limb movement: No    Narcolepsy:       denies sudden urges of sleep attacks     denies cataplexy     denies sleep paralysis      Yes hallucinations     Sleep Behaviors:     denies leg symptoms/movements     denies motor restlessness     denies night terrors     Yes bruxism, he has mouth guard but did not use it     denies     automatic behaviors    Other subjective complaints:     denies anxiety or rumination      denies pain and discomfort at  night     denies waking up with heart pounding or racing     denies GERD/heartburn         Parasomnia:   NREM - denies recurrent persistent confusional arousal, night eating, sleep walking or sleep terrors. He sleepwalked as child and rarely as adult.  REM  - occasional dream enactment during dreams. Patient has vivid dreams 1-2 months and sleepwalking. He is confused in morning in  panic situation.     Safety: None             Medications:     Current Outpatient Prescriptions   Medication Sig     metoprolol (TOPROL-XL) 25 MG 24 hr tablet Take 1 tablet (25 mg) by mouth daily     No current facility-administered medications for this visit.         Medication that can affect sleep: none    No Known Allergies         Past Medical History:     Does not need 02 supplement at night     No past medical history on file.            Past Surgical History:    No previous upper airway surgery     No past surgical history on file.         Social History:     Social History   Substance Use Topics     Smoking status: Never Smoker     Smokeless tobacco: Never Used     Alcohol use Yes      Comment: very rare         Chemical History:     Tobacco: No     Uses 2 cups/day of coffee. Last caffeine intake is usually before 1-2 pm    EtOH: No  Recreational Drugs: No    Psych Hx:   None    Current dangers to self or others: None           Family History:     Family History   Problem Relation Age of Onset     Hypertension Father      Thrombosis Father 42     DVT     Other - See Comments Father      WPW     Thrombosis Mother      Colon Cancer Maternal Grandfather      Migraines Paternal Grandmother      Dementia Paternal Grandfather      Hypertension Brother      Other - See Comments Paternal Aunt      Mitral valve prolapse        Sleep Family Hx:        RLS- No  CIARRA - 2 brother  Insomnia - No  Parasomnia - brother with sleepwalk         Review of Systems:     A complete 10 point review of systems was negative other than HPI or as commented below:   Patient denies chest pain, dyspnea with activity and or rest, wheezing, abdominal pain, n&v, fever, chills, dysuria, leg pain or swelling. Patient is also denies ear pain, sore throat, postnasal drip, running nose, dry cough.  Patient has irregular heart beats.    Ludwin Massey has lost 15 pounds in the last year.            Physical Examination:   /88  Pulse 88   "Resp 10  Ht 1.753 m (5' 9.02\")  Wt 75.8 kg (167 lb)  SpO2 100%  BMI 24.65 kg/m2     Neck Circumference: 36 cm   Constitutional: . Awake, alert, cooperative, in no apparent distress  Mood: euthymic; affect congruent with full range and intensity.  Attention/Concentration:  Normal   Eyes: Pupils round and reactive. No icterus.  ENT: Mallampati Class: IV.   Tonsillar Stage: 1  hidden by pillars  Clear nasal passages. Enlarged inferior turbinates. Nasal deviated septum mildly to left.  Oropharynx: No high arched palate. No pharyngeal erythema or exudates, elongated uvula. No lateral narrowing  Tongue: No relative macroglossia   Dentition: Good.  Dentures: None  Neck: Supple, no thyroid enlargement.   Cardiovascular: Regular S1 and S2, no gallops or murmurs.   Pulmonary:  Chest symmetric, lungs clear bilaterally and no crackles, wheezes or rales.  Abdomen: Soft, obese, non tender.  Extremities:  No pedal edema.  Muscle/joint: Strength and tone normal   Skin:  No rash or significant lesions.   Neurologic: Alert, oriented x3, no focal neurological deficit.           Data: All pertinent previous laboratory data reviewed     No results found for: PH, PHARTERIAL, PO2, [id number], SAT, PCO2, HCO3, BASEEXCESS, KIM, BEB  No results found for: TSH  Lab Results   Component Value Date     (H) 04/13/2017     No results found for: HGB  Lab Results   Component Value Date    BUN 8 04/13/2017    CR 0.94 04/13/2017     Lab Results   Component Value Date    CO2 29 04/13/2017     No results found for: JENIFFER      Echocardiography: 10/23/17  Interpretation Summary     Hx of cleft mitral valve repair. There is prolapse of the anterior mitral  leaflet.  There is mild (1+) mitral regurgitation.  There is mild (1+) tricuspid regurgitation.  History of ASD repair. There is no color Doppler evidence of an atrial shunt.  The left ventricle is normal in size. There is normal left ventricular wall  thickness. The visual ejection fraction is " estimated at 55-60%. Septal motion  is consistent with post-operative state.    Chest x-ray: No    PFT: No        Copy to: Tamiko Flannery  Copy to: Rubina Solorio MD 12/7/2017   [facility]  [address], [place] [address]   [phone] Clinic    Total time spent with patient: 42 minutes with this patient today in which 25 minutes was spent in counseling/coordination of care and going over planned testing and recommendations.

## 2017-12-13 NOTE — PROGRESS NOTES
SUBJECTIVE: 27 year old male with sore throat, myalgias, swollen glands, headache and fever for several days. No history of rheumatic fever. Other symptoms: none.    OBJECTIVE:   Vitals as noted above.  Appears mild distress.  Ears: normal  Oropharynx: mild erythema  Neck: supple and moderate nontender anterior cervical nodes  Lungs: chest clear to IPPA and clear to IPPA  Rapid Strep test is negative    ASSESSMENT: 1. pharyngitis    PLAN: Per orders. Gargle, use acetaminophen or other OTC analgesic, and take Rx fully as prescribed. Call if other family members develop similar symptoms. See prn.

## 2017-12-14 ENCOUNTER — CARE COORDINATION (OUTPATIENT)
Dept: CARDIOLOGY | Facility: CLINIC | Age: 27
End: 2017-12-14

## 2017-12-14 DIAGNOSIS — Q23.82 CONGENITAL CLEFT LEAFLET OF MITRAL VALVE: ICD-10-CM

## 2017-12-14 DIAGNOSIS — R00.2 PALPITATIONS: Primary | ICD-10-CM

## 2017-12-14 DIAGNOSIS — Q21.11 OSTIUM SECUNDUM TYPE ATRIAL SEPTAL DEFECT: ICD-10-CM

## 2017-12-14 DIAGNOSIS — R00.2 PALPITATIONS: ICD-10-CM

## 2017-12-14 DIAGNOSIS — Q21.20 ASD (ATRIAL SEPTAL DEFECT), OSTIUM PRIMUM: ICD-10-CM

## 2017-12-14 DIAGNOSIS — Z87.74 S/P CLOSURE OF CONGENITAL ATRIAL SEPTAL DEFECT BY PERCUTANEOUS TRANSCATHETER TECHNIQUE: Primary | ICD-10-CM

## 2017-12-14 DIAGNOSIS — I10 HYPERTENSION, UNSPECIFIED TYPE: ICD-10-CM

## 2017-12-14 DIAGNOSIS — Z87.74 S/P CLOSURE OF CONGENITAL ATRIAL SEPTAL DEFECT BY PERCUTANEOUS TRANSCATHETER TECHNIQUE: ICD-10-CM

## 2017-12-14 RX ORDER — METOPROLOL SUCCINATE 50 MG/1
50 TABLET, EXTENDED RELEASE ORAL DAILY
Qty: 90 TABLET | Refills: 3 | Status: SHIPPED | OUTPATIENT
Start: 2017-12-14 | End: 2018-11-15

## 2017-12-14 NOTE — PROGRESS NOTES
"Date: 12/14/2017    Time of Call: 12:08 PM     Diagnosis:  Results of Amb BP monitor and questions about palpitations - pt c/o \"rhythm disturbances\" - every morning and several times per day in which he needs to beardown to stop.      [ TORB ] Ordering provider: Dr. Regalado March   Order: Increase Metoprolol Xl to 50 mg daily for Hypertension and wear a Ziopatch monitor for 7 days (make an appointment with EP after monitor results come back).     Order received by: Norman Juares      Follow-up/additional notes: Updated patient who would like to wear the monitor at [place] - will call to get this schedules.  Patient agrees to increasing dosage of Metoprolol.  All questions answered.    Norman Juares RN, BSN  Cardiology Care Coordinator  [facility]  [email or web address]  [phone]            "

## 2017-12-15 ENCOUNTER — HOSPITAL ENCOUNTER (OUTPATIENT)
Dept: CARDIOLOGY | Facility: CLINIC | Age: 27
Discharge: HOME | End: 2017-12-15
Attending: INTERNAL MEDICINE | Admitting: INTERNAL MEDICINE
Payer: COMMERCIAL

## 2017-12-15 DIAGNOSIS — Q23.82 CONGENITAL CLEFT LEAFLET OF MITRAL VALVE: ICD-10-CM

## 2017-12-15 DIAGNOSIS — R00.2 PALPITATIONS: ICD-10-CM

## 2017-12-15 DIAGNOSIS — Z87.74 S/P CLOSURE OF CONGENITAL ATRIAL SEPTAL DEFECT BY PERCUTANEOUS TRANSCATHETER TECHNIQUE: ICD-10-CM

## 2017-12-15 DIAGNOSIS — Q21.11 OSTIUM SECUNDUM TYPE ATRIAL SEPTAL DEFECT: ICD-10-CM

## 2017-12-15 PROCEDURE — 0298T ZZC EXT ECG > 48HR TO 21 DAY REVIEW AND INTERPRETATN: CPT | Performed by: INTERNAL MEDICINE

## 2017-12-15 PROCEDURE — 0296T ZIO PATCH HOLTER: CPT | Performed by: INTERNAL MEDICINE

## 2017-12-23 ENCOUNTER — THERAPY VISIT (OUTPATIENT)
Dept: SLEEP MEDICINE | Facility: CLINIC | Age: 27
End: 2017-12-23
Payer: COMMERCIAL

## 2017-12-23 DIAGNOSIS — G47.52 DREAM ENACTMENT BEHAVIOR: ICD-10-CM

## 2017-12-23 DIAGNOSIS — G47.9 SLEEP DISTURBANCE: ICD-10-CM

## 2017-12-23 PROCEDURE — 95810 POLYSOM 6/> YRS 4/> PARAM: CPT | Performed by: INTERNAL MEDICINE

## 2017-12-23 NOTE — MR AVS SNAPSHOT
After Visit Summary   12/23/2017    Ludwin Massey    MRN: [id number]           Patient Information     Date Of Birth          1990        Visit Information        Provider Department      12/23/2017 8:00 PM BK BED [facility]        Today's Diagnoses     Dream enactment behavior        Sleep disturbance           Follow-ups after your visit        Your next 10 appointments already scheduled     Dec 28, 2017  3:00 PM CST   Return Sleep Patient with Tim Solorio MD   [facility] ([facility])    [address] [address] [address] [phone]              Who to contact     If you have questions or need follow up information about today's clinic visit or your schedule please contact [facility] directly at [phone].  Normal or non-critical lab and imaging results will be communicated to you by MyChart, letter or phone within 4 business days after the clinic has received the results. If you do not hear from us within 7 days, please contact the clinic through 360Thart or phone. If you have a critical or abnormal lab result, we will notify you by phone as soon as possible.  Submit refill requests through Deep-Secure or call your pharmacy and they will forward the refill request to us. Please allow 3 business days for your refill to be completed.          Additional Information About Your Visit        MyChart Information     Deep-Secure gives you secure access to your electronic health record. If you see a primary care provider, you can also send messages to your care team and make appointments. If you have questions, please call your primary care clinic.  If you do not have a primary care provider, please call [phone] and they will assist you.        Care EveryWhere ID     This is your Care EveryWhere ID. This could be used by other organizations to access your [facility]  records  [id number]         Blood Pressure from Last 3 Encounters:   12/07/17 154/88   12/03/17 110/70   11/09/17 146/90    Weight from Last 3 Encounters:   12/07/17 75.8 kg (167 lb)   12/03/17 75.8 kg (167 lb)   11/09/17 76.2 kg (167 lb 14.4 oz)              We Performed the Following     Comprehensive Sleep Study        Primary Care Provider Office Phone # Fax #    Tamiko Zelaya Alma Delia, APRN [facility] [phone]       [address] [facility] [address]  [facility] [address]        Equal Access to Services     [facility]: Hadii aad ku hadasho Soomaali, waaxda luqadaha, qaybta kaalmada adeegyada, waxgretta teague hayaan adecory rivas . So [facility] [phone].    ATENCIÓN: Si habla español, tiene a jones disposición servicios gratuitos de asistencia lingüística. Llame al [phone].    We comply with applicable federal civil rights laws and [place] laws. We do not discriminate on the basis of race, color, national origin, age, disability, sex, sexual orientation, or gender identity.            Thank you!     Thank you for choosing [facility]  for your care. Our goal is always to provide you with excellent care. Hearing back from our patients is one way we can continue to improve our services. Please take a few minutes to complete the written survey that you may receive in the mail after your visit with us. Thank you!             Your Updated Medication List - Protect others around you: Learn how to safely use, store and throw away your medicines at www.disposemymeds.org.          This list is accurate as of: 12/23/17 11:59 PM.  Always use your most recent med list.                   Brand Name Dispense Instructions for use Diagnosis    metoprolol 50 MG 24 hr tablet    TOPROL-XL    90 tablet    Take 1 tablet (50 mg) by mouth daily    S/P closure of congenital atrial septal defect by percutaneous transcatheter technique, Congenital cleft leaflet of mitral valve, ASD (atrial septal defect), ostium  primum, Hypertension, unspecified type       zolpidem 10 MG tablet    AMBIEN    1 tablet    Take tablet by mouth 15 minutes prior to sleep, for Sleep Study    Sleep disturbance

## 2017-12-26 NOTE — PROCEDURES
" SLEEP STUDY INTERPRETATION  DIAGNOSTIC POLYSOMNOGRAPHY REPORT      Patient: Ludwin Massey  YOB: 1990  Study Date: 12/23/2017  MRN: [id number]  Referring Provider: Dr Sabine Rojo MD  Ordering Provider: Dr. Solorio    Indications for Polysomnography: The patient is a 27 y old Male who is 5' 9\" and weighs 167.0 lbs. His BMI is 24.7, Vona sleepiness scale 9.0 and neck circumference is 36.0 cm. 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 A diagnostic polysomnogram was performed to evaluate for sleep apnea, PLMS, RBD, CSA, hypoventilation and hypoxemia.    Polysomnogram Data: A full night polysomnogram recorded the standard physiologic parameters including EEG, EOG, EMG, ECG, nasal and oral airflow. Respiratory parameters of chest and abdominal movements were recorded with respiratory inductance plethysmography. Oxygen saturation was recorded by pulse oximetry.     Sleep Architecture: All stages of sleep were achieved. There was no sleep fragmentation. The sleep efficiency was normal.  The total recording time of the polysomnogram was 497.1 minutes. The total sleep time was 462.0 minutes. Sleep latency was normal/increased/decreased at 14.9 minutes without the use of a sleep aid. REM latency was 121.5 minutes. Arousal index was normal at 9.7 arousals per hour. Sleep efficiency was normal at 92.9%. Wake after sleep onset was 19.5 minutes. The patient spent 8.4% of total sleep time in Stage N1, 52.2% in Stage N2, 20.2% in Stage N3, and 19.2% in REM. Time in REM supine was 88.5 minutes.    Respiration: There was no sleep disordered breathing and without sleep related hypoxemia.    Events ? The polysomnogram revealed a presence of - obstructive, 2 central, and 0 mixed apneas resulting in an apnea index of 0.3 events per hour. There were 1 obstructive hypopneas and - central " hypopneas resulting in a obstructive hypopnea index of 0.1 and central hypopnea index of - events per hour. The combined apnea/hypopnea index was 0.4 events per hour. The REM AHI was 2.0 events per hour. The supine AHI was 0.4 events per hour. The RERA index was 0.5 events per hour.  The RDI was 0.9 events per hour.    Snoring - was reported as mild    Respiratory rate and pattern - was notable for normal respiratory rate and pattern.    Sustained Sleep Associated Hypoventilation - Transcutaneous carbon dioxide monitoring was used, however significant hypoventilation was not suggested by oximetry, or was not present with a maximum change from 38.5 to 45.9 mmHg and 0 minutes at or greater than 55 mmHg.    Sleep Associated Hypoxemia - (Greater than 5 minutes O2 sat at or below 88%) was not present. Baseline oxygen saturation was 97.4%. Lowest oxygen saturation was 93.0%. Time spent less than or equal to 88% was 0 minutes. Time spent less than or equal to 89% was 0 minutes.    Movement Activity: There were frequent periodic leg movements without significant  associated arousals. There was no abnormal nocturnal sleep behavior.    Periodic Limb Activity - There were 566 PLMs during the entire study. The PLM index was 73.5 movements per hour. The PLM Arousal Index was 4.7 per hour.    REM EMG Activity - Excessive transient/sustained muscle activity was not present.    Nocturnal Behavior - Abnormal sleep related behaviors were not noted during NREM / REM sleep.    Bruxism - None apparent.    Cardiac Summary: There was normal sinus rhythm throughout the night.  The average pulse rate was 54.5 bpm. The minimum pulse rate was 45.9 bpm while the maximum pulse rate was 86.1 bpm.  Arrhythmias were not noted.        Assessment:     Periodic Limb Movement Disorder G47.61    Recommendations:    There was no sleep disordered breathing but frequent periodic leg movements.    Re-evaluation and Pharmacologic management of restless legs  syndrome and Ferritin  in  next clinic visit.    Advice regarding the risks of drowsy driving.    Suggest optimizing sleep schedule and avoiding sleep deprivation.    Weight management (if BMI > 30).    Follow up primary care doctor and/or referring physician.       _____________________________________   electronically signed by: GERTRUDE MENJIVAR MD (12/26/17)

## 2017-12-28 ENCOUNTER — OFFICE VISIT (OUTPATIENT)
Dept: SLEEP MEDICINE | Facility: CLINIC | Age: 27
End: 2017-12-28
Payer: COMMERCIAL

## 2017-12-28 VITALS
SYSTOLIC BLOOD PRESSURE: 138 MMHG | DIASTOLIC BLOOD PRESSURE: 83 MMHG | HEIGHT: 69 IN | WEIGHT: 162 LBS | BODY MASS INDEX: 23.99 KG/M2 | RESPIRATION RATE: 15 BRPM | OXYGEN SATURATION: 100 % | HEART RATE: 76 BPM

## 2017-12-28 DIAGNOSIS — G47.61 PLMD (PERIODIC LIMB MOVEMENT DISORDER): Primary | ICD-10-CM

## 2017-12-28 PROCEDURE — 99212 OFFICE O/P EST SF 10 MIN: CPT | Performed by: INTERNAL MEDICINE

## 2017-12-28 NOTE — PROGRESS NOTES
Sleep Study Follow-Up Visit:    Date on this visit: 2017    ASSESSMENT / PLAN:    PLMD (periodic limb movement disorder)  Discussed the result of sleep study which showed no sleep disordered breathing but a frequent periodic leg movement  No RLS symptoms or leg movements at night. No need for intervention at this time.    All questions were answered.  The patient indicates understanding of the above issues and agrees with the plan set forth.    No orders of the defined types were placed in this encounter.      He will follow up with me as needed.      BRIEF SUMMARY:    Ludwin Massey is a 27 year old male with history of hypertension, primum atrial septal defect and cleft mitral valve s/p repair 2013, PVC's and SVT and strong family history of sleep apnea comes in today for follow-up of his sleep study done on 17 at the [facility] for result of sleep study. Please see H&P for his presenting sleep symptoms for additional details.    PS17  Sleep latency 14.9 minutes without sleep aid.    REM achieved.   REM latency 121.5 minutes.    Sleep efficiency 92.9%. Total sleep time 462 minutes.  Sleep architecture:  Stage 1, 8.4% (5%), stage 2, 52.2% (45-55%), stage 3, 20.2% (15-20%), stage REM, 19.2% (20-25%).    AHI was 0.4/hour.   CSAI was 0.3/hour.   RDI 0.9/hour.    REM AHI 2/hour.    Supine AHI 0.4/hour.    Lowest O2 saturation:93%  S/He spent 0 minutes below 88% SpO2.   Periodic Limb Movement Index 73.5/hour.       These findings were reviewed with the patient and copy of the sleep study result was given.    Past medical/surgical history, family history, social history, medications and allergies were reviewed.      Problem List:  Patient Active Problem List    Diagnosis Date Noted     Congenital cleft leaflet of mitral valve 2017     Priority: Medium     Patient is followed by the Adult Congenital and Cardiovascular Genetics Clinic 2017     Priority: Medium     Benign  "essential hypertension 03/07/2016     Priority: Medium     ASD (atrial septal defect), ostium primum s/p repair  10/05/2012     Priority: Medium             Medications:     Current Outpatient Prescriptions   Medication Sig     metoprolol (TOPROL-XL) 50 MG 24 hr tablet Take 1 tablet (50 mg) by mouth daily     zolpidem (AMBIEN) 10 MG tablet Take tablet by mouth 15 minutes prior to sleep, for Sleep Study     No current facility-administered medications for this visit.           PHYSICAL EXAMINATION:  /83  Pulse 76  Resp 15  Ht 1.753 m (5' 9\")  Wt 73.5 kg (162 lb)  SpO2 100%  BMI 23.92 kg/m2          Data: All pertinent previous laboratory data reviewed     No results found for: PH, PHARTERIAL, PO2, [id number], SAT, PCO2, HCO3, BASEEXCESS, KIM, BEB  No results found for: TSH  Lab Results   Component Value Date     (H) 04/13/2017     No results found for: HGB  Lab Results   Component Value Date    BUN 8 04/13/2017    CR 0.94 04/13/2017     No results found for: JENIFFER     Ten minutes spent with patient, all of which were spent face-to-face counseling, consulting, coordinating plan of care, going over sleep test results and chart review.          Tim Solorio MD 12/28/2017   [facility]  [facility] [address], [place], [place] [id number] [phone] Clinic      CC: No ref. provider found  Copy to: Tamiko Flannery    "

## 2017-12-28 NOTE — MR AVS SNAPSHOT
After Visit Summary   12/28/2017    Ludwin Massey    MRN: [id number]           Patient Information     Date Of Birth          1990        Visit Information        Provider Department      12/28/2017 3:00 PM Tim Solorio MD [facility]        Today's Diagnoses     PLMD (periodic limb movement disorder)    -  1      Care Instructions          Your BMI is Body mass index is 23.92 kg/(m^2).  Weight management is a personal decision.  If you are interested in exploring weight loss strategies, the following discussion covers the approaches that may be successful. Body mass index (BMI) is one way to tell whether you are at a healthy weight, overweight, or obese. It measures your weight in relation to your height.  A BMI of 18.5 to 24.9 is in the healthy range. A person with a BMI of 25 to 29.9 is considered overweight, and someone with a BMI of 30 or greater is considered obese. More than two-thirds of American adults are considered overweight or obese.  Being overweight or obese increases the risk for further weight gain. Excess weight may lead to heart disease and diabetes.  Creating and following plans for healthy eating and physical activity may help you improve your health.  Weight control is part of healthy lifestyle and includes exercise, emotional health, and healthy eating habits. Careful eating habits lifelong are the mainstay of weight control. Though there are significant health benefits from weight loss, long-term weight loss with diet alone may be very difficult to achieve- studies show long-term success with dietary management in less than 10% of people. Attaining a healthy weight may be especially difficult to achieve in those with severe obesity. In some cases, medications, devices and surgical management might be considered.  What can you do?  If you are overweight or obese and are interested in methods for weight loss, you should discuss this with your  provider.     Consider reducing daily calorie intake by 500 calories.     Keep a food journal.     Avoiding skipping meals, consider cutting portions instead.    Diet combined with exercise helps maintain muscle while optimizing fat loss. Strength training is particularly important for building and maintaining muscle mass. Exercise helps reduce stress, increase energy, and improves fitness. Increasing exercise without diet control, however, may not burn enough calories to loose weight.       Start walking three days a week 10-20 minutes at a time    Work towards walking thirty minutes five days a week     Eventually, increase the speed of your walking for 1-2 minutes at time    In addition, we recommend that you review healthy lifestyles and methods for weight loss available through the National Institutes of Health patient information sites:  http://win.niddk.nih.gov/publications/index.htm    And look into health and wellness programs that may be available through your health insurance provider, employer, local community center, or cristino club.    Weight management plan: Patient was referred to their PCP to discuss a diet and exercise plan.     Your blood pressure was checked while you were in clinic today.  Please read the guidelines below about what these numbers mean and what you should do about them.  Your systolic blood pressure is the top number.  This is the pressure when the heart is pumping.  Your diastolic blood pressure is the bottom number.  This is the pressure in between beats.  If your systolic blood pressure is less than 120 and your diastolic blood pressure is less than 80, then your blood pressure is normal. There is nothing more that you need to do about it  If your systolic blood pressure is 120-139 or your diastolic blood pressure is 80-89, your blood pressure may be higher than it should be.  You should have your blood pressure re-checked within a year by a primary care provider.  If your  "systolic blood pressure is 140 or greater or your diastolic blood pressure is 90 or greater, you may have high blood pressure.  High blood pressure is treatable, but if left untreated over time it can put you at risk for heart attack, stroke, or kidney failure.  You should have your blood pressure re-checked by a primary care provider within the next four weeks.              Follow-ups after your visit        Who to contact     If you have questions or need follow up information about today's clinic visit or your schedule please contact [facility] directly at [phone].  Normal or non-critical lab and imaging results will be communicated to you by [employer], letter or phone within 4 business days after the clinic has received the results. If you do not hear from us within 7 days, please contact the clinic through [employer] or phone. If you have a critical or abnormal lab result, we will notify you by phone as soon as possible.  Submit refill requests through [employer] or call your pharmacy and they will forward the refill request to us. Please allow 3 business days for your refill to be completed.          Additional Information About Your Visit        MyChart Information     [employer] gives you secure access to your electronic health record. If you see a primary care provider, you can also send messages to your care team and make appointments. If you have questions, please call your primary care clinic.  If you do not have a primary care provider, please call [phone] and they will assist you.        Care EveryWhere ID     This is your Care EveryWhere ID. This could be used by other organizations to access your [facility] medical records  [id number]        Your Vitals Were     Pulse Respirations Height Pulse Oximetry BMI (Body Mass Index)       76 15 1.753 m (5' 9\") 100% 23.92 kg/m2        Blood Pressure from Last 3 Encounters:   12/28/17 138/83   12/07/17 154/88   12/03/17 110/70    Weight " from Last 3 Encounters:   12/28/17 73.5 kg (162 lb)   12/07/17 75.8 kg (167 lb)   12/03/17 75.8 kg (167 lb)              Today, you had the following     No orders found for display       Primary Care Provider Office Phone # Fax #    DEBBIE Shen [phone]       [address] [facility] [address]  [facility] [address]        Equal Access to Services     BIPIN URIARTE : Hadii aad ku hadasho Soomaali, waaxda luqadaha, qaybta kaalmada adeegyada, waxay idiin hayaan adeeg kharash la'aan . So [facility] [phone].    ATENCIÓN: Si habla español, tiene a jones disposición servicios gratuitos de asistencia lingüística. [facility] [phone].    We comply with applicable federal civil rights laws and [place] laws. We do not discriminate on the basis of race, color, national origin, age, disability, sex, sexual orientation, or gender identity.            Thank you!     Thank you for choosing [facility]  for your care. Our goal is always to provide you with excellent care. Hearing back from our patients is one way we can continue to improve our services. Please take a few minutes to complete the written survey that you may receive in the mail after your visit with us. Thank you!             Your Updated Medication List - Protect others around you: Learn how to safely use, store and throw away your medicines at www.disposemymeds.org.          This list is accurate as of: 12/28/17  3:21 PM.  Always use your most recent med list.                   Brand Name Dispense Instructions for use Diagnosis    metoprolol 50 MG 24 hr tablet    TOPROL-XL    90 tablet    Take 1 tablet (50 mg) by mouth daily    S/P closure of congenital atrial septal defect by percutaneous transcatheter technique, Congenital cleft leaflet of mitral valve, ASD (atrial septal defect), ostium primum, Hypertension, unspecified type       zolpidem 10 MG tablet    AMBIEN    1 tablet    Take tablet by mouth 15 minutes prior to  sleep, for Sleep Study    Sleep disturbance

## 2017-12-28 NOTE — NURSING NOTE
"Chief Complaint   Patient presents with     RECHECK     F/u Psg at  12/23       Initial /83  Pulse 76  Resp 15  Ht 1.753 m (5' 9\")  Wt 73.5 kg (162 lb)  SpO2 100%  BMI 23.92 kg/m2 Estimated body mass index is 23.92 kg/(m^2) as calculated from the following:    Height as of this encounter: 1.753 m (5' 9\").    Weight as of this encounter: 73.5 kg (162 lb).  Medication Reconciliation: complete         Ayesha Bear LPN/MARIO  "

## 2017-12-28 NOTE — PATIENT INSTRUCTIONS
Your BMI is Body mass index is 23.92 kg/(m^2).  Weight management is a personal decision.  If you are interested in exploring weight loss strategies, the following discussion covers the approaches that may be successful. Body mass index (BMI) is one way to tell whether you are at a healthy weight, overweight, or obese. It measures your weight in relation to your height.  A BMI of 18.5 to 24.9 is in the healthy range. A person with a BMI of 25 to 29.9 is considered overweight, and someone with a BMI of 30 or greater is considered obese. More than two-thirds of American adults are considered overweight or obese.  Being overweight or obese increases the risk for further weight gain. Excess weight may lead to heart disease and diabetes.  Creating and following plans for healthy eating and physical activity may help you improve your health.  Weight control is part of healthy lifestyle and includes exercise, emotional health, and healthy eating habits. Careful eating habits lifelong are the mainstay of weight control. Though there are significant health benefits from weight loss, long-term weight loss with diet alone may be very difficult to achieve- studies show long-term success with dietary management in less than 10% of people. Attaining a healthy weight may be especially difficult to achieve in those with severe obesity. In some cases, medications, devices and surgical management might be considered.  What can you do?  If you are overweight or obese and are interested in methods for weight loss, you should discuss this with your provider.     Consider reducing daily calorie intake by 500 calories.     Keep a food journal.     Avoiding skipping meals, consider cutting portions instead.    Diet combined with exercise helps maintain muscle while optimizing fat loss. Strength training is particularly important for building and maintaining muscle mass. Exercise helps reduce stress, increase energy, and improves  fitness. Increasing exercise without diet control, however, may not burn enough calories to loose weight.       Start walking three days a week 10-20 minutes at a time    Work towards walking thirty minutes five days a week     Eventually, increase the speed of your walking for 1-2 minutes at time    In addition, we recommend that you review healthy lifestyles and methods for weight loss available through the National Institutes of Health patient information sites:  http://win.niddk.nih.gov/publications/index.htm    And look into health and wellness programs that may be available through your health insurance provider, employer, local community center, or cristino club.    Weight management plan: Patient was referred to their PCP to discuss a diet and exercise plan.     Your blood pressure was checked while you were in clinic today.  Please read the guidelines below about what these numbers mean and what you should do about them.  Your systolic blood pressure is the top number.  This is the pressure when the heart is pumping.  Your diastolic blood pressure is the bottom number.  This is the pressure in between beats.  If your systolic blood pressure is less than 120 and your diastolic blood pressure is less than 80, then your blood pressure is normal. There is nothing more that you need to do about it  If your systolic blood pressure is 120-139 or your diastolic blood pressure is 80-89, your blood pressure may be higher than it should be.  You should have your blood pressure re-checked within a year by a primary care provider.  If your systolic blood pressure is 140 or greater or your diastolic blood pressure is 90 or greater, you may have high blood pressure.  High blood pressure is treatable, but if left untreated over time it can put you at risk for heart attack, stroke, or kidney failure.  You should have your blood pressure re-checked by a primary care provider within the next four weeks.

## 2018-01-11 ENCOUNTER — CARE COORDINATION (OUTPATIENT)
Dept: CARDIOLOGY | Facility: CLINIC | Age: 28
End: 2018-01-11

## 2018-01-11 NOTE — PROGRESS NOTES
Reviewed the results of the Ziopatch with the patient.  Dr. Rojo recommended based on the results of the Zio to go back to 25 mg daily of Metoprolol XL.  Patient was reluctant because of his symptomology.  Dr. Rojo agreed that if patient was feeling better at 50 mg he could stay at that dosage.  He states that he was still having palpitations where he felt he needed to bear-down to relieve them during the Zio period but these were not seen on the monitor.  Patient agreed he would like to try 25 mg to see if the palpitations get worse again, if so he would like to wear another Holter to see if we can catch any of the arrhythmias this time and would increase to 50 mg daily if needed. Dr. Rojo agreed to this plan.  Patient will decrease to 25 mg daily at this time and call our clinic if his palpitations become bothersome at which point we will place a 48 hour Holter.    Norman Juares RN, BSN  Cardiology Care Coordinator  [facility]  [email or web address]  [phone]

## 2018-09-04 ENCOUNTER — CARE COORDINATION (OUTPATIENT)
Dept: CARDIOLOGY | Facility: CLINIC | Age: 28
End: 2018-09-04

## 2018-09-04 ENCOUNTER — TELEPHONE (OUTPATIENT)
Dept: CARDIOLOGY | Facility: CLINIC | Age: 28
End: 2018-09-04

## 2018-09-04 DIAGNOSIS — Q21.10 ASD (ATRIAL SEPTAL DEFECT): Primary | ICD-10-CM

## 2018-09-04 DIAGNOSIS — R00.2 PALPITATIONS: ICD-10-CM

## 2018-09-04 NOTE — PROGRESS NOTES
Spoke with patient in regards to the arrhythmia issues he has been having. The symptoms are similar to what he as experiencing before.  States that he needs to squat down to stop them and it causes a lot of pressure in his head.  Advised him he needs to wear a Holter for 48 hours and have a stress test that was recommended by Dr. Rojo.  Will set him up with an echo and appt to review testing Oct. 11th at [facility] per patient request.  Orders placed.    Norman Juares RN, BSN  Cardiology Care Coordinator  [facility]  [email or web address]  [phone]

## 2018-09-14 ENCOUNTER — HOSPITAL ENCOUNTER (OUTPATIENT)
Dept: CARDIOLOGY | Facility: CLINIC | Age: 28
Discharge: HOME | End: 2018-09-14
Attending: INTERNAL MEDICINE | Admitting: INTERNAL MEDICINE
Payer: COMMERCIAL

## 2018-09-14 DIAGNOSIS — R00.2 PALPITATIONS: ICD-10-CM

## 2018-09-14 DIAGNOSIS — Q21.10 ASD (ATRIAL SEPTAL DEFECT): ICD-10-CM

## 2018-09-14 PROCEDURE — 94621 CARDIOPULM EXERCISE TESTING: CPT | Performed by: INTERNAL MEDICINE

## 2018-09-14 PROCEDURE — 93227 XTRNL ECG REC<48 HR R&I: CPT | Performed by: INTERNAL MEDICINE

## 2018-09-14 PROCEDURE — 94618 PULMONARY STRESS TESTING: CPT | Mod: 26 | Performed by: INTERNAL MEDICINE

## 2018-09-14 PROCEDURE — 93225 XTRNL ECG REC<48 HRS REC: CPT | Performed by: INTERNAL MEDICINE

## 2018-09-14 PROCEDURE — 93016 CV STRESS TEST SUPVJ ONLY: CPT | Performed by: INTERNAL MEDICINE

## 2018-09-20 LAB — INTERPRETATION MONITOR -MUSE: NORMAL

## 2018-10-11 ENCOUNTER — OFFICE VISIT (OUTPATIENT)
Dept: CARDIOLOGY | Facility: CLINIC | Age: 28
End: 2018-10-11
Attending: INTERNAL MEDICINE
Payer: COMMERCIAL

## 2018-10-11 ENCOUNTER — HOSPITAL ENCOUNTER (OUTPATIENT)
Dept: CARDIOLOGY | Facility: CLINIC | Age: 28
Discharge: HOME | End: 2018-10-11
Attending: INTERNAL MEDICINE | Admitting: INTERNAL MEDICINE
Payer: COMMERCIAL

## 2018-10-11 VITALS
HEIGHT: 69 IN | DIASTOLIC BLOOD PRESSURE: 80 MMHG | HEART RATE: 67 BPM | WEIGHT: 169 LBS | BODY MASS INDEX: 25.03 KG/M2 | SYSTOLIC BLOOD PRESSURE: 125 MMHG

## 2018-10-11 DIAGNOSIS — R00.2 PALPITATIONS: ICD-10-CM

## 2018-10-11 DIAGNOSIS — Q21.10 ASD (ATRIAL SEPTAL DEFECT): ICD-10-CM

## 2018-10-11 DIAGNOSIS — Q23.82 CONGENITAL CLEFT LEAFLET OF MITRAL VALVE: Primary | ICD-10-CM

## 2018-10-11 DIAGNOSIS — Q24.9 CONGENITAL HEART ANOMALY: ICD-10-CM

## 2018-10-11 DIAGNOSIS — I10 BENIGN ESSENTIAL HYPERTENSION: ICD-10-CM

## 2018-10-11 LAB
ALBUMIN SERPL-MCNC: 4.3 G/DL (ref 3.4–5)
ALP SERPL-CCNC: 58 U/L (ref 40–150)
ALT SERPL W P-5'-P-CCNC: 58 U/L (ref 0–70)
ANION GAP SERPL CALCULATED.3IONS-SCNC: 5 MMOL/L (ref 3–14)
AST SERPL W P-5'-P-CCNC: 39 U/L (ref 0–45)
BASOPHILS # BLD AUTO: 0 10E9/L (ref 0–0.2)
BASOPHILS NFR BLD AUTO: 0.8 %
BILIRUB SERPL-MCNC: 0.6 MG/DL (ref 0.2–1.3)
BUN SERPL-MCNC: 13 MG/DL (ref 7–30)
CALCIUM SERPL-MCNC: 9.1 MG/DL (ref 8.5–10.1)
CHLORIDE SERPL-SCNC: 103 MMOL/L (ref 94–109)
CHOLEST SERPL-MCNC: 132 MG/DL
CO2 SERPL-SCNC: 31 MMOL/L (ref 20–32)
CREAT SERPL-MCNC: 0.97 MG/DL (ref 0.66–1.25)
DIFFERENTIAL METHOD BLD: NORMAL
EOSINOPHIL # BLD AUTO: 0.1 10E9/L (ref 0–0.7)
EOSINOPHIL NFR BLD AUTO: 2.3 %
ERYTHROCYTE [DISTWIDTH] IN BLOOD BY AUTOMATED COUNT: 12.3 % (ref 10–15)
GFR SERPL CREATININE-BSD FRML MDRD: >90 ML/MIN/1.7M2
GLUCOSE SERPL-MCNC: 116 MG/DL (ref 70–99)
HCT VFR BLD AUTO: 45.3 % (ref 40–53)
HDLC SERPL-MCNC: 59 MG/DL
HGB BLD-MCNC: 15.5 G/DL (ref 13.3–17.7)
IMM GRANULOCYTES # BLD: 0 10E9/L (ref 0–0.4)
IMM GRANULOCYTES NFR BLD: 0.6 %
LDLC SERPL CALC-MCNC: 62 MG/DL
LYMPHOCYTES # BLD AUTO: 1.8 10E9/L (ref 0.8–5.3)
LYMPHOCYTES NFR BLD AUTO: 36.2 %
MCH RBC QN AUTO: 31.4 PG (ref 26.5–33)
MCHC RBC AUTO-ENTMCNC: 34.2 G/DL (ref 31.5–36.5)
MCV RBC AUTO: 92 FL (ref 78–100)
MONOCYTES # BLD AUTO: 0.3 10E9/L (ref 0–1.3)
MONOCYTES NFR BLD AUTO: 6.4 %
NEUTROPHILS # BLD AUTO: 2.6 10E9/L (ref 1.6–8.3)
NEUTROPHILS NFR BLD AUTO: 53.7 %
NONHDLC SERPL-MCNC: 73 MG/DL
NRBC # BLD AUTO: 0 10*3/UL
NRBC BLD AUTO-RTO: 0 /100
PLATELET # BLD AUTO: 167 10E9/L (ref 150–450)
POTASSIUM SERPL-SCNC: 4.1 MMOL/L (ref 3.4–5.3)
PROT SERPL-MCNC: 7.2 G/DL (ref 6.8–8.8)
RBC # BLD AUTO: 4.93 10E12/L (ref 4.4–5.9)
SODIUM SERPL-SCNC: 139 MMOL/L (ref 133–144)
TRIGL SERPL-MCNC: 54 MG/DL
TSH SERPL DL<=0.005 MIU/L-ACNC: 2.34 MU/L (ref 0.4–4)
WBC # BLD AUTO: 4.8 10E9/L (ref 4–11)

## 2018-10-11 PROCEDURE — 36415 COLL VENOUS BLD VENIPUNCTURE: CPT | Performed by: INTERNAL MEDICINE

## 2018-10-11 PROCEDURE — 80053 COMPREHEN METABOLIC PANEL: CPT | Performed by: INTERNAL MEDICINE

## 2018-10-11 PROCEDURE — 85025 COMPLETE CBC W/AUTO DIFF WBC: CPT | Performed by: INTERNAL MEDICINE

## 2018-10-11 PROCEDURE — 99213 OFFICE O/P EST LOW 20 MIN: CPT | Performed by: INTERNAL MEDICINE

## 2018-10-11 PROCEDURE — 93320 DOPPLER ECHO COMPLETE: CPT

## 2018-10-11 PROCEDURE — 84443 ASSAY THYROID STIM HORMONE: CPT | Performed by: INTERNAL MEDICINE

## 2018-10-11 PROCEDURE — 80061 LIPID PANEL: CPT | Performed by: INTERNAL MEDICINE

## 2018-10-11 NOTE — PATIENT INSTRUCTIONS
"You were seen today in the Adult Congenital and Cardiovascular Genetics Clinic at the [facility].    Cardiology Providers you saw during your visit:  Dr. Sabine Rojo     Diagnosis:  ASD repaired and mitral valve repair    Results:  The results of your echo, labs, stress test and Holter were discussed with you today.    Recommendations:    1.  Continue to eat a heart healthy, low salt diet.  2.  Continue to get 20-30 minutes of aerobic activity, 4-5 days per week.  Examples of aerobic activity include walking, running, swimming, cycling, etc.  3.  Continue to observe good oral hygiene, with regular dental visits.  4.  Please wear a Ziopatch monitor for 14 days. Please call us when you would like to wear this.      Vitals:    10/11/18 0808   BP: 132/86   Pulse: 67   Weight: 76.7 kg (169 lb)   Height: 1.753 m (5' 9\")       Exercise restrictions:   Yes__X__  No____         If yes, list restrictions:  Must be allowed to rest if fatigued or SOB      Work restrictions:  Yes____  No__X__         If yes, list restrictions:    FASTING CHOLESTEROL was checked in the last 5 years YES_X__  NO___ 2018  Continue to eat a heart healthy, low salt diet.         ____ Fasting lipid panel order today         ____ No changes in medications          ____ I recommend the following changes in your cholesterol medications.:          ____ Please follow up for cholesterol screening at your primary care physician      Follow-up:  Follow up with Dr. Rojo in 1 year with an echo.    If you have questions or concerns please contact us at:    Norman Juares RN, BSN   Clint Jorge (Scheduling)  Nurse Coordinator     Clinic   Adult Congenital and CV Genetics Adult Congenital and CV Genetic  [facility]  (P)[phone]    (P) [phone]  [email or web address] (F)[phone]        For after hours urgent needs, call [phone] and ask to " speak to the Adult Congenital Physician on call.  Mention Job Code 0401.    For emergencies call 911.    [facility]  Mail Code [id number]  [address] [id number]

## 2018-10-11 NOTE — LETTER
10/11/2018      Tamiko Flannery, APRN CNP  [address]  [facility] [address]      RE: Ludwin Massey       Dear Colleague,    I had the pleasure of seeing Ludwin Massey in the [facility].    Service Date: 10/11/2018      HISTORY OF PRESENT ILLNESS:  Mr. Massey is a very pleasant, 28-year-old gentleman with a past medical history significant for primary atrial septal defect and cleft mitral valve diagnosed at 13 years of age.  He had surgery at the [facility] and was followed by Dr. Wang since his surgery in 2003.      When I met Mr. Massey for the first time in 11/2017, his symptoms were primarily palpitations.  He continues to have those and reports that at times they get worse, and they get so bad he will have up to 20 episodes a day.  He will have to squat to daniele this abnormal rhythm.  We did do a recent 48 hour Holter, and he reports while he was wearing it, he had none of these symptoms, and we did not capture anything.  In the past, he had to have a Ziopatch at one point, and he had some short runs of SVTs and PVCs, but nothing of significance.  He did have a TSH with this visit, and it was normal.  CBC was unremarkable.  He was not fasting, so his sugar was elevated.  He has a 1-year-old daughter now.  She is just learning to walk.  Life has changed a lot in the past year with her, but in a positive way.  He has not been exercising as much, though, as a result, but knows he needs to get back into it.  In the past, he was a pretty extensive cyclist.  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        As part of his evaluation, he underwent an echo.  I reviewed those images, and it looks unchanged compared to prior.  We did a cardiopulmonary stress test.  He went 87% of predicted, class I.  He reports this was primarily deconditioning.  No arrhythmias of significance were noted with this study.       IMPRESSION/PLAN:   1.  Status post repair of primary atrial septal defect and cleft mitral valve 2003 at [facility].  We do not have this operative report.   2.  Mild mitral and tricuspid insufficiency, stable.   3.  Episodes of arrhythmia that come in flares, currently asymptomatic.   4.  Probable celiac disease.   5.  Hypertension.      DISCUSSION:  It was a pleasure to see Mr. Greer in followup.  Clinically, he is doing well with no concerning cardiac symptoms.  He reports that he knows he needs to get back into exercising, which he will plan to do.  When we saw him last year, we did start him on Toprol.  He is taking 25 a day.  His blood pressure is better.  I did recheck it today, and it is the same in both arms.  He would prefer to let us know if he has recurrence of symptoms, and we will plan to do a 14 day Ziopatch if that is the case.  Otherwise, we will plan to see him back in a year with an echo or, of course, sooner if there are any issues in the interim.  He understands and is in agreement with the plan as outlined.  All questions were answered.        It was a pleasure to see him.  Please do not hesitate to contact me with any questions or concerns.         ETHAN ANDREWS MD             D: 10/11/2018   T: 10/11/2018   MT: kenney      Name:     MEL GREER   MRN:      [id number]        Account:      [id number]   :      1990           Service Date: 10/11/2018      Document: [id number]         Outpatient Encounter Prescriptions as of 10/11/2018   Medication Sig Dispense Refill     Magnesium-Zinc (MAGNESIUM-CHELATED ZINC PO) Take 3 tablets by mouth daily       metoprolol (TOPROL-XL) 50 MG 24 hr tablet Take 1 tablet (50 mg) by mouth daily (Patient taking differently: Take 25 mg by mouth daily ) 90 tablet 3     zolpidem (AMBIEN) 10 MG tablet Take tablet by mouth 15 minutes prior to sleep, for Sleep Study (Patient not taking: Reported on 10/11/2018) 1 tablet 0     No  facility-administered encounter medications on file as of 10/11/2018.      Again, thank you for allowing me to participate in the care of your patient.      Sincerely,    Rubina Rojo MD     [facility]

## 2018-10-11 NOTE — MR AVS SNAPSHOT
"              After Visit Summary   10/11/2018    Ludwin Massey    MRN: [id number]           Patient Information     Date Of Birth          1990        Visit Information        Provider Department      10/11/2018 8:30 AM March, Rubina Regalado MD [facility]        Today's Diagnoses     Congenital cleft leaflet of mitral valve    -  1    ASD (atrial septal defect)        Benign essential hypertension        Patient is followed by the Adult Congenital and Cardiovascular Genetics Clinic        Palpitations          Care Instructions    You were seen today in the Adult Congenital and Cardiovascular Genetics Clinic at the [facility].    Cardiology Providers you saw during your visit:  Dr. Sabine Rojo     Diagnosis:  ASD repaired and mitral valve repair    Results:  The results of your echo, labs, stress test and Holter were discussed with you today.    Recommendations:    1.  Continue to eat a heart healthy, low salt diet.  2.  Continue to get 20-30 minutes of aerobic activity, 4-5 days per week.  Examples of aerobic activity include walking, running, swimming, cycling, etc.  3.  Continue to observe good oral hygiene, with regular dental visits.  4.  Please wear a Ziopatch monitor for 14 days. Please call us when you would like to wear this.      Vitals:    10/11/18 0808   BP: 132/86   Pulse: 67   Weight: 76.7 kg (169 lb)   Height: 1.753 m (5' 9\")       Exercise restrictions:   Yes__X__  No____         If yes, list restrictions:  Must be allowed to rest if fatigued or SOB      Work restrictions:  Yes____  No__X__         If yes, list restrictions:    FASTING CHOLESTEROL was checked in the last 5 years YES_X__  NO___ 2018  Continue to eat a heart healthy, low salt diet.         ____ Fasting lipid panel order today         ____ No changes in medications          ____ I recommend the following changes in your cholesterol medications.:          ____ Please follow up " for cholesterol screening at your primary care physician      Follow-up:  Follow up with Dr. Rojo in 1 year with an echo.    If you have questions or concerns please contact us at:    Norman Juares, RN, BSN   Clint Jorge (Scheduling)  Nurse Coordinator     Clinic   Adult Congenital and CV Genetics Adult Congenital and CV Genetic  [facility]  (P)[phone]    (P) [phone]  [email or web address] (F)[phone]        For after hours urgent needs, call [phone] and ask to speak to the Adult Congenital Physician on call.  Mention Job Code 0401.    For emergencies call [phone].    [facility] Heart [facility]  Mail Code [id number]  [address] [place]  [address]           Follow-ups after your visit        Additional Services     Follow-Up With Adult Congenital and CV Genetics       Please allow the [place] to schedule this follow-up with an echo                  Future tests that were ordered for you today     Open Future Orders        Priority Expected Expires Ordered    Follow-Up With Adult Congenital and CV Genetics Routine  10/11/2019 10/11/2018    Ziopatch Holter Monitor - Adult Routine  12/10/2018 10/11/2018    Echo congenital adult Routine 10/11/2019 10/11/2019 10/11/2018            Who to contact     If you have questions or need follow up information about today's clinic visit or your schedule please contact [facility]   RADHA directly at [phone].  Normal or non-critical lab and imaging results will be communicated to you by MyChart, letter or phone within 4 business days after the clinic has received the results. If you do not hear from us within 7 days, please contact the clinic through MyChart or phone. If you have a critical or abnormal lab result, we will notify you by phone as soon as  "possible.  Submit refill requests through [employer] or call your pharmacy and they will forward the refill request to us. Please allow 3 business days for your refill to be completed.          Additional Information About Your Visit        [employer] Information     [employer] gives you secure access to your electronic health record. If you see a primary care provider, you can also send messages to your care team and make appointments. If you have questions, please call your primary care clinic.  If you do not have a primary care provider, please call [phone] and they will assist you.        Care EveryWhere ID     This is your Care EveryWhere ID. This could be used by other organizations to access your [place] medical records  [id number]        Your Vitals Were     Pulse Height BMI (Body Mass Index)             67 1.753 m (5' 9\") 24.96 kg/m2          Blood Pressure from Last 3 Encounters:   10/11/18 125/80   12/28/17 138/83   12/07/17 154/88    Weight from Last 3 Encounters:   10/11/18 76.7 kg (169 lb)   12/28/17 73.5 kg (162 lb)   12/07/17 75.8 kg (167 lb)              We Performed the Following     Follow-Up with Congenital Heart Clinic          Today's Medication Changes          These changes are accurate as of 10/11/18  9:08 AM.  If you have any questions, ask your nurse or doctor.               These medicines have changed or have updated prescriptions.        Dose/Directions    metoprolol succinate 50 MG 24 hr tablet   Commonly known as:  TOPROL-XL   This may have changed:  how much to take   Used for:  S/P closure of congenital atrial septal defect by percutaneous transcatheter technique, Congenital cleft leaflet of mitral valve, ASD (atrial septal defect), ostium primum, Hypertension, unspecified type        Dose:  50 mg   Take 1 tablet (50 mg) by mouth daily   Quantity:  90 tablet   Refills:  3                Primary Care Provider Office Phone # Fax #    DEBBIE Shen [phone] " [phone]       [address] [facility] [address]  [facility] [address]        Equal Access to Services     BIPIN URIARTE : Hadii aad ku hadgideono Soreyesali, waaxda luqadaha, qaybta kaalmada adechemada, esau wojciechin hayaakareen bergcory oneal rob lindo. So [facility] [phone].    ATENCIÓN: Si habla español, tiene a jones disposición servicios gratuitos de asistencia lingüística. Llame al [phone].    We comply with applicable federal civil rights laws and [place] laws. We do not discriminate on the basis of race, color, national origin, age, disability, sex, sexual orientation, or gender identity.            Thank you!     Thank you for choosing [facility] HEART [facility]  for your care. Our goal is always to provide you with excellent care. Hearing back from our patients is one way we can continue to improve our services. Please take a few minutes to complete the written survey that you may receive in the mail after your visit with us. Thank you!             Your Updated Medication List - Protect others around you: Learn how to safely use, store and throw away your medicines at www.disposemymeds.org.          This list is accurate as of 10/11/18  9:08 AM.  Always use your most recent med list.                   Brand Name Dispense Instructions for use Diagnosis    MAGNESIUM-CHELATED ZINC PO      Take 3 tablets by mouth daily        metoprolol succinate 50 MG 24 hr tablet    TOPROL-XL    90 tablet    Take 1 tablet (50 mg) by mouth daily    S/P closure of congenital atrial septal defect by percutaneous transcatheter technique, Congenital cleft leaflet of mitral valve, ASD (atrial septal defect), ostium primum, Hypertension, unspecified type       zolpidem 10 MG tablet    AMBIEN    1 tablet    Take tablet by mouth 15 minutes prior to sleep, for Sleep Study    Sleep disturbance

## 2018-10-11 NOTE — NURSING NOTE
Chief Complaint   Patient presents with     Follow Up For     to review echo results     FU Cardiac testing     blood work done today        Cardiac Monitors: Patient was instructed regarding the indication, function, care and prompt return of a holter  monitor. The monitor was placed on the patient with instructions regarding care of the skin electrodes and monitor, as well as documentation in the patient diary. Patient demonstrated understanding of this information and agreed to call with further questions or concerns.  Cardiac Testing: Patient given instructions regarding  echocardiogram . Discussed purpose, preparation, procedure and when to expect results reported back to the patient. Patient demonstrated understanding of this information and agreed to call with further questions or concerns.  Med Reconcile: Reviewed and verified all current medications with the patient. The updated medication list was printed and given to the patient.  Return Appointment: Patient given instructions regarding scheduling next clinic visit. Patient demonstrated understanding of this information and agreed to call with further questions or concerns.  Patient stated he understood all health information given and agreed to call with further questions or concerns.     Norman Juares RN, BSN  Cardiology Care Coordinator  [facility]  [email or web address]  [phone]

## 2018-10-11 NOTE — LETTER
10/11/2018    Tamiko Flannery, APRN CNP  [address]  [facility] [address]    RE: Ludwin Massey       Dear Colleague,    I had the pleasure of seeing Ludwin Massey in the [facility].    HPI:     Please see dictated note    PAST MEDICAL HISTORY:  History reviewed. No pertinent past medical history.    CURRENT MEDICATIONS:  Current Outpatient Prescriptions   Medication Sig Dispense Refill     Magnesium-Zinc (MAGNESIUM-CHELATED ZINC PO) Take 3 tablets by mouth daily       metoprolol (TOPROL-XL) 50 MG 24 hr tablet Take 1 tablet (50 mg) by mouth daily (Patient taking differently: Take 25 mg by mouth daily ) 90 tablet 3     zolpidem (AMBIEN) 10 MG tablet Take tablet by mouth 15 minutes prior to sleep, for Sleep Study (Patient not taking: Reported on 10/11/2018) 1 tablet 0       PAST SURGICAL HISTORY:  History reviewed. No pertinent surgical history.    ALLERGIES   No Known Allergies    FAMILY HISTORY:  Family History   Problem Relation Age of Onset     Hypertension Father      Thrombosis Father 42     DVT     Other - See Comments Father      WPW     Thrombosis Mother      Colon Cancer Maternal Grandfather      Migraines Paternal Grandmother      Dementia Paternal Grandfather      Hypertension Brother      Other - See Comments Paternal Aunt      Mitral valve prolapse       SOCIAL HISTORY:  Social History     Social History     Marital status: Single     Spouse name: N/A     Number of children: N/A     Years of education: N/A     Social History Main Topics     Smoking status: Never Smoker     Smokeless tobacco: Never Used     Alcohol use Yes      Comment: very rare     Drug use: No     Sexual activity: Yes     Other Topics Concern     Parent/Sibling W/ Cabg, Mi Or Angioplasty Before 65f 55m? No     Social History Narrative       ROS:   Constitutional: No fever, chills, or sweats. No weight gain/loss   ENT: No visual disturbance, ear ache, epistaxis, sore  "throat  Allergies/Immunologic: Negative.   Respiratory: No cough, hemoptysia  Cardiovascular: As per HPI  GI: No nausea, vomiting, hematemesis, melena, or hematochezia  : No urinary frequency, dysuria, or hematuria  Integument: Negative  Psychiatric: Negative  Neuro: Negative  Endocrinology: Negative   Musculoskeletal: Negative    EXAM:  /80  Pulse 67  Ht 1.753 m (5' 9\")  Wt 76.7 kg (169 lb)  BMI 24.96 kg/m2  In general, the patient is a pleasant male in no apparent distress.    HEENT: NC/AT.  PERRLA.  EOMI.  Sclerae white, not injected.  Nares clear.  Pharynx without erythema or exudate.  Dentition intact.    Neck: No adenopathy.  No thyromegaly. Carotids +4/4 bilaterally without bruits.  No jugular venous distension.   Heart: RRR. Normal S1, S2 splits physiologically. There is a soft systolic murmur. No diastolic murmur  Lungs: CTA.  No ronchi, wheezes, rales.  .   Abdomen: Soft, nontender, nondistended.  Extremities: No clubbing, cyanosis, or edema.   Neurologic: Alert and oriented to person/place/time, normal speech, gait and affect  Skin: No petechiae, purpura or rash.    Labs:  LIPID RESULTS:  Lab Results   Component Value Date    CHOL 124 04/13/2017    HDL 55 04/13/2017    LDL 51 04/13/2017    TRIG 90 04/13/2017    NHDL 69 04/13/2017       LIVER ENZYME RESULTS:  Lab Results   Component Value Date    AST 27 04/13/2017    ALT 27 04/13/2017       CBC RESULTS:  Lab Results   Component Value Date    WBC 4.8 10/11/2018    RBC 4.93 10/11/2018    HGB 15.5 10/11/2018    HCT 45.3 10/11/2018    MCV 92 10/11/2018    MCH 31.4 10/11/2018    MCHC 34.2 10/11/2018    RDW 12.3 10/11/2018     10/11/2018       BMP RESULTS:  Lab Results   Component Value Date     04/13/2017    POTASSIUM 4.5 04/13/2017    CHLORIDE 105 04/13/2017    CO2 29 04/13/2017    ANIONGAP 8 04/13/2017     (H) 04/13/2017    BUN 8 04/13/2017    CR 0.94 04/13/2017    GFRESTIMATED >90  Non  GFR Calc   04/13/2017    " GFRESTBLACK >90   GFR Calc   04/13/2017    BHUPENDRA 9.4 04/13/2017        NEGRITA Rojo MD     CC  Patient Care Team:  Tamiko Flannery APRN CNP as PCP - General (Nurse Practitioner)  Norman Juares, RN as Nurse Coordinator (Cardiology)  Rubina Rojo MD as MD (Cardiology)  RUBINA ROJO    Service Date: 10/11/2018      HISTORY OF PRESENT ILLNESS:  Mr. Massey is a very pleasant, 28-year-old gentleman with a past medical history significant for primary atrial septal defect and cleft mitral valve diagnosed at 13 years of age.  He had surgery at the [facility] and was followed by Dr. Wang since his surgery in 2003.      When I met Mr. Massey for the first time in 11/2017, his symptoms were primarily palpitations.  He continues to have those and reports that at times they get worse, and they get so bad he will have up to 20 episodes a day.  He will have to squat to daniele this abnormal rhythm.  We did do a recent 48 hour Holter, and he reports while he was wearing it, he had none of these symptoms, and we did not capture anything.  In the past, he had to have a Ziopatch at one point, and he had some short runs of SVTs and PVCs, but nothing of significance.  He did have a TSH with this visit, and it was normal.  CBC was unremarkable.  He was not fasting, so his sugar was elevated.  He has a 1-year-old daughter now.  She is just learning to walk.  Life has changed a lot in the past year with her, but in a positive way.  He has not been exercising as much, though, as a result, but knows he needs to get back into it.  In the past, he was a pretty extensive cyclist.  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        As part of his evaluation, he underwent an echo.  I reviewed those images, and it looks unchanged compared to prior.  We did a cardiopulmonary stress test.  He went 87% of predicted, class I.  He reports this was  primarily deconditioning.  No arrhythmias of significance were noted with this study.      IMPRESSION/PLAN:   1.  Status post repair of primary atrial septal defect and cleft mitral valve 2003 at [facility].  We do not have this operative report.   2.  Mild mitral and tricuspid insufficiency, stable.   3.  Episodes of arrhythmia that come in flares, currently asymptomatic.   4.  Probable celiac disease.   5.  Hypertension.      DISCUSSION:  It was a pleasure to see Mr. Greer in followup.  Clinically, he is doing well with no concerning cardiac symptoms.  He reports that he knows he needs to get back into exercising, which he will plan to do.  When we saw him last year, we did start him on Toprol.  He is taking 25 a day.  His blood pressure is better.  I did recheck it today, and it is the same in both arms.  He would prefer to let us know if he has recurrence of symptoms, and we will plan to do a 14 day Ziopatch if that is the case.  Otherwise, we will plan to see him back in a year with an echo or, of course, sooner if there are any issues in the interim.  He understands and is in agreement with the plan as outlined.  All questions were answered.        It was a pleasure to see him.  Please do not hesitate to contact me with any questions or concerns.         RUBINA ROJO MD             D: 10/11/2018   T: 10/11/2018   MT: kenney      Name:     MEL GREER   MRN:      [id number]        Account:      [id number]   :      1990           Service Date: 10/11/2018      Document: [id number]       Thank you for allowing me to participate in the care of your patient.      Sincerely,     Rubina Rojo MD     [facility]    cc:   Rubina Rojo MD  [address] [facility] [address]  [place] [address]

## 2018-10-11 NOTE — PROGRESS NOTES
Service Date: 10/11/2018      HISTORY OF PRESENT ILLNESS:  Mr. Massey is a very pleasant, 28-year-old gentleman with a past medical history significant for primary atrial septal defect and cleft mitral valve diagnosed at 13 years of age.  He had surgery at the [facility] and was followed by Dr. Wang since his surgery in 2003.      When I met Mr. Massey for the first time in 11/2017, his symptoms were primarily palpitations.  He continues to have those and reports that at times they get worse, and they get so bad he will have up to 20 episodes a day.  He will have to squat to daniele this abnormal rhythm.  We did do a recent 48 hour Holter, and he reports while he was wearing it, he had none of these symptoms, and we did not capture anything.  In the past, he had to have a Ziopatch at one point, and he had some short runs of SVTs and PVCs, but nothing of significance.  He did have a TSH with this visit, and it was normal.  CBC was unremarkable.  He was not fasting, so his sugar was elevated.  He has a 1-year-old daughter now.  She is just learning to walk.  Life has changed a lot in the past year with her, but in a positive way.  He has not been exercising as much, though, as a result, but knows he needs to get back into it.  In the past, he was a pretty extensive cyclist.  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        As part of his evaluation, he underwent an echo.  I reviewed those images, and it looks unchanged compared to prior.  We did a cardiopulmonary stress test.  He went 87% of predicted, class I.  He reports this was primarily deconditioning.  No arrhythmias of significance were noted with this study.      IMPRESSION/PLAN:   1.  Status post repair of primary atrial septal defect and cleft mitral valve 06/2003 at [facility].  We do not have this operative report.   2.  Mild mitral and tricuspid insufficiency, stable.   3.   Episodes of arrhythmia that come in flares, currently asymptomatic.   4.  Probable celiac disease.   5.  Hypertension.      DISCUSSION:  It was a pleasure to see Mr. Greer in followup.  Clinically, he is doing well with no concerning cardiac symptoms.  He reports that he knows he needs to get back into exercising, which he will plan to do.  When we saw him last year, we did start him on Toprol.  He is taking 25 a day.  His blood pressure is better.  I did recheck it today, and it is the same in both arms.  He would prefer to let us know if he has recurrence of symptoms, and we will plan to do a 14 day Ziopatch if that is the case.  Otherwise, we will plan to see him back in a year with an echo or, of course, sooner if there are any issues in the interim.  He understands and is in agreement with the plan as outlined.  All questions were answered.        It was a pleasure to see him.  Please do not hesitate to contact me with any questions or concerns.         ETHAN ANDREWS MD             D: 10/11/2018   T: 10/11/2018   MT: kenney      Name:     MEL GREER   MRN:      [id number]        Account:      [id number]   :      1990           Service Date: 10/11/2018      Document: [id number]

## 2018-10-11 NOTE — PROGRESS NOTES
HPI:     Please see dictated note    PAST MEDICAL HISTORY:  History reviewed. No pertinent past medical history.    CURRENT MEDICATIONS:  Current Outpatient Prescriptions   Medication Sig Dispense Refill     Magnesium-Zinc (MAGNESIUM-CHELATED ZINC PO) Take 3 tablets by mouth daily       metoprolol (TOPROL-XL) 50 MG 24 hr tablet Take 1 tablet (50 mg) by mouth daily (Patient taking differently: Take 25 mg by mouth daily ) 90 tablet 3     zolpidem (AMBIEN) 10 MG tablet Take tablet by mouth 15 minutes prior to sleep, for Sleep Study (Patient not taking: Reported on 10/11/2018) 1 tablet 0       PAST SURGICAL HISTORY:  History reviewed. No pertinent surgical history.    ALLERGIES   No Known Allergies    FAMILY HISTORY:  Family History   Problem Relation Age of Onset     Hypertension Father      Thrombosis Father 42     DVT     Other - See Comments Father      WPW     Thrombosis Mother      Colon Cancer Maternal Grandfather      Migraines Paternal Grandmother      Dementia Paternal Grandfather      Hypertension Brother      Other - See Comments Paternal Aunt      Mitral valve prolapse       SOCIAL HISTORY:  Social History     Social History     Marital status: Single     Spouse name: N/A     Number of children: N/A     Years of education: N/A     Social History Main Topics     Smoking status: Never Smoker     Smokeless tobacco: Never Used     Alcohol use Yes      Comment: very rare     Drug use: No     Sexual activity: Yes     Other Topics Concern     Parent/Sibling W/ Cabg, Mi Or Angioplasty Before 65f 55m? No     Social History Narrative       ROS:   Constitutional: No fever, chills, or sweats. No weight gain/loss   ENT: No visual disturbance, ear ache, epistaxis, sore throat  Allergies/Immunologic: Negative.   Respiratory: No cough, hemoptysia  Cardiovascular: As per HPI  GI: No nausea, vomiting, hematemesis, melena, or hematochezia  : No urinary frequency, dysuria, or hematuria  Integument: Negative  Psychiatric:  "Negative  Neuro: Negative  Endocrinology: Negative   Musculoskeletal: Negative    EXAM:  /80  Pulse 67  Ht 1.753 m (5' 9\")  Wt 76.7 kg (169 lb)  BMI 24.96 kg/m2  In general, the patient is a pleasant male in no apparent distress.    HEENT: NC/AT.  PERRLA.  EOMI.  Sclerae white, not injected.  Nares clear.  Pharynx without erythema or exudate.  Dentition intact.    Neck: No adenopathy.  No thyromegaly. Carotids +4/4 bilaterally without bruits.  No jugular venous distension.   Heart: RRR. Normal S1, S2 splits physiologically. There is a soft systolic murmur. No diastolic murmur  Lungs: CTA.  No ronchi, wheezes, rales.  .   Abdomen: Soft, nontender, nondistended.  Extremities: No clubbing, cyanosis, or edema.   Neurologic: Alert and oriented to person/place/time, normal speech, gait and affect  Skin: No petechiae, purpura or rash.    Labs:  LIPID RESULTS:  Lab Results   Component Value Date    CHOL 124 04/13/2017    HDL 55 04/13/2017    LDL 51 04/13/2017    TRIG 90 04/13/2017    NHDL 69 04/13/2017       LIVER ENZYME RESULTS:  Lab Results   Component Value Date    AST 27 04/13/2017    ALT 27 04/13/2017       CBC RESULTS:  Lab Results   Component Value Date    WBC 4.8 10/11/2018    RBC 4.93 10/11/2018    HGB 15.5 10/11/2018    HCT 45.3 10/11/2018    MCV 92 10/11/2018    MCH 31.4 10/11/2018    MCHC 34.2 10/11/2018    RDW 12.3 10/11/2018     10/11/2018       BMP RESULTS:  Lab Results   Component Value Date     04/13/2017    POTASSIUM 4.5 04/13/2017    CHLORIDE 105 04/13/2017    CO2 29 04/13/2017    ANIONGAP 8 04/13/2017     (H) 04/13/2017    BUN 8 04/13/2017    CR 0.94 04/13/2017    GFRESTIMATED >90  Non  GFR Calc   04/13/2017    GFRESTBLACK >90   GFR Calc   04/13/2017    BHUPENDRA 9.4 04/13/2017        NEGRITA Rojo MD     CC  Patient Care Team:  Tamiko Flannery APRN CNP as PCP - General (Nurse Practitioner)  Norman Juares RN as Nurse Coordinator " (Cardiology)  Rubina Rojo MD as MD (Cardiology)  RUBINA ROJO

## 2018-10-19 ENCOUNTER — ALLIED HEALTH/NURSE VISIT (OUTPATIENT)
Dept: NURSING | Facility: CLINIC | Age: 28
End: 2018-10-19
Payer: COMMERCIAL

## 2018-10-19 DIAGNOSIS — Z23 NEED FOR PROPHYLACTIC VACCINATION AND INOCULATION AGAINST INFLUENZA: Primary | ICD-10-CM

## 2018-10-19 PROCEDURE — 90471 IMMUNIZATION ADMIN: CPT

## 2018-10-19 PROCEDURE — 90686 IIV4 VACC NO PRSV 0.5 ML IM: CPT

## 2018-10-19 NOTE — PROGRESS NOTES

## 2018-10-19 NOTE — MR AVS SNAPSHOT
After Visit Summary   10/19/2018    Ludwin Massey    MRN: [id number]           Patient Information     Date Of Birth          1990        Visit Information        Provider Department      10/19/2018 4:00 PM [facility] NURSE [facility]        Today's Diagnoses     Need for prophylactic vaccination and inoculation against influenza    -  1       Follow-ups after your visit        Your next 10 appointments already scheduled     Oct 19, 2018  4:00 PM CDT   Nurse Only with [facility] NURSE   [facility] ([facility])    [address]  [facility] [id number] [phone]              Who to contact     If you have questions or need follow up information about today's clinic visit or your schedule please contact [facility] directly at [phone].  Normal or non-critical lab and imaging results will be communicated to you by MyChart, letter or phone within 4 business days after the clinic has received the results. If you do not hear from us within 7 days, please contact the clinic through MyChart or phone. If you have a critical or abnormal lab result, we will notify you by phone as soon as possible.  Submit refill requests through TUBE or call your pharmacy and they will forward the refill request to us. Please allow 3 business days for your refill to be completed.          Additional Information About Your Visit        MyChart Information     TUBE gives you secure access to your electronic health record. If you see a primary care provider, you can also send messages to your care team and make appointments. If you have questions, please call your primary care clinic.  If you do not have a primary care provider, please call [phone] and they will assist you.        Care EveryWhere ID     This is your Care EveryWhere ID. This could be used by other organizations to access your [facility]  records  [id number]         Blood Pressure from Last 3 Encounters:   10/11/18 125/80   12/28/17 138/83   12/07/17 154/88    Weight from Last 3 Encounters:   10/11/18 169 lb (76.7 kg)   12/28/17 162 lb (73.5 kg)   12/07/17 167 lb (75.8 kg)              We Performed the Following     FLU VACCINE, SPLIT VIRUS, IM (QUADRIVALENT) [92597]- >3 YRS     Vaccine Administration, Initial [82842]          Today's Medication Changes          These changes are accurate as of 10/19/18  3:58 PM.  If you have any questions, ask your nurse or doctor.               These medicines have changed or have updated prescriptions.        Dose/Directions    metoprolol succinate 50 MG 24 hr tablet   Commonly known as:  TOPROL-XL   This may have changed:  how much to take   Used for:  S/P closure of congenital atrial septal defect by percutaneous transcatheter technique, Congenital cleft leaflet of mitral valve, ASD (atrial septal defect), ostium primum, Hypertension, unspecified type        Dose:  50 mg   Take 1 tablet (50 mg) by mouth daily   Quantity:  90 tablet   Refills:  3                Primary Care Provider Office Phone # Fax #    Tamikosebastián Flannery, APRN [facility] [phone]       [address] [place] [address]        Equal Access to Services     BIPIN URIARTE AH: Hadii uzair carrington hadasho Soomaali, waaxda luqadaha, qaybta kaalmada adeegyada, esau teague haynirav lindo. So [facility] [phone].    ATENCIÓN: Si habla español, tiene a jones disposición servicios gratuitos de asistencia lingüística. Llame al [phone].    We comply with applicable federal civil rights laws and [place] laws. We do not discriminate on the basis of race, color, national origin, age, disability, sex, sexual orientation, or gender identity.            Thank you!     Thank you for choosing [facility]  for your care. Our goal is always to provide you with excellent care. Hearing back from our patients is one way we  can continue to improve our services. Please take a few minutes to complete the written survey that you may receive in the mail after your visit with us. Thank you!             Your Updated Medication List - Protect others around you: Learn how to safely use, store and throw away your medicines at www.disposemymeds.org.          This list is accurate as of 10/19/18  3:58 PM.  Always use your most recent med list.                   Brand Name Dispense Instructions for use Diagnosis    MAGNESIUM-CHELATED ZINC PO      Take 3 tablets by mouth daily        metoprolol succinate 50 MG 24 hr tablet    TOPROL-XL    90 tablet    Take 1 tablet (50 mg) by mouth daily    S/P closure of congenital atrial septal defect by percutaneous transcatheter technique, Congenital cleft leaflet of mitral valve, ASD (atrial septal defect), ostium primum, Hypertension, unspecified type       zolpidem 10 MG tablet    AMBIEN    1 tablet    Take tablet by mouth 15 minutes prior to sleep, for Sleep Study    Sleep disturbance

## 2018-11-11 ENCOUNTER — HOSPITAL ENCOUNTER (EMERGENCY)
Facility: CLINIC | Age: 28
Discharge: HOME | End: 2018-11-11
Attending: EMERGENCY MEDICINE | Admitting: EMERGENCY MEDICINE
Payer: COMMERCIAL

## 2018-11-11 VITALS
BODY MASS INDEX: 24.34 KG/M2 | WEIGHT: 170 LBS | OXYGEN SATURATION: 98 % | DIASTOLIC BLOOD PRESSURE: 73 MMHG | TEMPERATURE: 98.9 F | HEART RATE: 117 BPM | RESPIRATION RATE: 18 BRPM | HEIGHT: 70 IN | SYSTOLIC BLOOD PRESSURE: 136 MMHG

## 2018-11-11 DIAGNOSIS — K52.9 ACUTE GASTROENTERITIS: ICD-10-CM

## 2018-11-11 LAB
ALBUMIN SERPL-MCNC: 4.1 G/DL (ref 3.4–5)
ALP SERPL-CCNC: 53 U/L (ref 40–150)
ALT SERPL W P-5'-P-CCNC: 59 U/L (ref 0–70)
ANION GAP SERPL CALCULATED.3IONS-SCNC: 6 MMOL/L (ref 3–14)
AST SERPL W P-5'-P-CCNC: 39 U/L (ref 0–45)
BASOPHILS # BLD AUTO: 0 10E9/L (ref 0–0.2)
BASOPHILS NFR BLD AUTO: 0.4 %
BILIRUB SERPL-MCNC: 1.1 MG/DL (ref 0.2–1.3)
BUN SERPL-MCNC: 15 MG/DL (ref 7–30)
CALCIUM SERPL-MCNC: 8.7 MG/DL (ref 8.5–10.1)
CHLORIDE SERPL-SCNC: 102 MMOL/L (ref 94–109)
CO2 SERPL-SCNC: 29 MMOL/L (ref 20–32)
CREAT SERPL-MCNC: 1.03 MG/DL (ref 0.66–1.25)
DIFFERENTIAL METHOD BLD: ABNORMAL
EOSINOPHIL # BLD AUTO: 0.1 10E9/L (ref 0–0.7)
EOSINOPHIL NFR BLD AUTO: 0.7 %
ERYTHROCYTE [DISTWIDTH] IN BLOOD BY AUTOMATED COUNT: 12.3 % (ref 10–15)
GFR SERPL CREATININE-BSD FRML MDRD: 85 ML/MIN/1.7M2
GLUCOSE SERPL-MCNC: 137 MG/DL (ref 70–99)
HCT VFR BLD AUTO: 44.9 % (ref 40–53)
HGB BLD-MCNC: 15.6 G/DL (ref 13.3–17.7)
IMM GRANULOCYTES # BLD: 0 10E9/L (ref 0–0.4)
IMM GRANULOCYTES NFR BLD: 0.3 %
LIPASE SERPL-CCNC: 172 U/L (ref 73–393)
LYMPHOCYTES # BLD AUTO: 0.5 10E9/L (ref 0.8–5.3)
LYMPHOCYTES NFR BLD AUTO: 6.7 %
MCH RBC QN AUTO: 31.8 PG (ref 26.5–33)
MCHC RBC AUTO-ENTMCNC: 34.7 G/DL (ref 31.5–36.5)
MCV RBC AUTO: 91 FL (ref 78–100)
MONOCYTES # BLD AUTO: 0.5 10E9/L (ref 0–1.3)
MONOCYTES NFR BLD AUTO: 6.5 %
NEUTROPHILS # BLD AUTO: 6.1 10E9/L (ref 1.6–8.3)
NEUTROPHILS NFR BLD AUTO: 85.4 %
NRBC # BLD AUTO: 0 10*3/UL
NRBC BLD AUTO-RTO: 0 /100
PLATELET # BLD AUTO: 143 10E9/L (ref 150–450)
POTASSIUM SERPL-SCNC: 4 MMOL/L (ref 3.4–5.3)
PROT SERPL-MCNC: 7 G/DL (ref 6.8–8.8)
RBC # BLD AUTO: 4.91 10E12/L (ref 4.4–5.9)
SODIUM SERPL-SCNC: 137 MMOL/L (ref 133–144)
WBC # BLD AUTO: 7.1 10E9/L (ref 4–11)

## 2018-11-11 PROCEDURE — 25000128 H RX IP 250 OP 636: Performed by: EMERGENCY MEDICINE

## 2018-11-11 PROCEDURE — 96361 HYDRATE IV INFUSION ADD-ON: CPT

## 2018-11-11 PROCEDURE — 25000128 H RX IP 250 OP 636

## 2018-11-11 PROCEDURE — 99284 EMERGENCY DEPT VISIT MOD MDM: CPT | Mod: 25

## 2018-11-11 PROCEDURE — 83690 ASSAY OF LIPASE: CPT | Performed by: EMERGENCY MEDICINE

## 2018-11-11 PROCEDURE — 96374 THER/PROPH/DIAG INJ IV PUSH: CPT

## 2018-11-11 PROCEDURE — 80053 COMPREHEN METABOLIC PANEL: CPT | Performed by: EMERGENCY MEDICINE

## 2018-11-11 PROCEDURE — 93005 ELECTROCARDIOGRAM TRACING: CPT

## 2018-11-11 PROCEDURE — 85025 COMPLETE CBC W/AUTO DIFF WBC: CPT | Performed by: EMERGENCY MEDICINE

## 2018-11-11 RX ORDER — ONDANSETRON 2 MG/ML
4 INJECTION INTRAMUSCULAR; INTRAVENOUS EVERY 30 MIN PRN
Status: DISCONTINUED | OUTPATIENT
Start: 2018-11-11 | End: 2018-11-11 | Stop reason: HOSPADM

## 2018-11-11 RX ORDER — SODIUM CHLORIDE, SODIUM LACTATE, POTASSIUM CHLORIDE, CALCIUM CHLORIDE 600; 310; 30; 20 MG/100ML; MG/100ML; MG/100ML; MG/100ML
1000 INJECTION, SOLUTION INTRAVENOUS CONTINUOUS
Status: DISCONTINUED | OUTPATIENT
Start: 2018-11-11 | End: 2018-11-11 | Stop reason: HOSPADM

## 2018-11-11 RX ORDER — ONDANSETRON 4 MG/1
4 TABLET, ORALLY DISINTEGRATING ORAL EVERY 8 HOURS PRN
Qty: 10 TABLET | Refills: 0 | Status: SHIPPED | OUTPATIENT
Start: 2018-11-11 | End: 2018-11-14

## 2018-11-11 RX ORDER — ONDANSETRON 2 MG/ML
INJECTION INTRAMUSCULAR; INTRAVENOUS
Status: COMPLETED
Start: 2018-11-11 | End: 2018-11-11

## 2018-11-11 RX ADMIN — SODIUM CHLORIDE, POTASSIUM CHLORIDE, SODIUM LACTATE AND CALCIUM CHLORIDE 1000 ML: 600; 310; 30; 20 INJECTION, SOLUTION INTRAVENOUS at 01:07

## 2018-11-11 RX ADMIN — ONDANSETRON 4 MG: 2 INJECTION INTRAMUSCULAR; INTRAVENOUS at 01:07

## 2018-11-11 ASSESSMENT — ENCOUNTER SYMPTOMS
FEVER: 0
COUGH: 0
VOMITING: 1
PALPITATIONS: 1
DIARRHEA: 1
CHILLS: 0
SHORTNESS OF BREATH: 0
NAUSEA: 1

## 2018-11-11 NOTE — ED PROVIDER NOTES
"  History     Chief Complaint:  Nausea & Vomiting    HPI   Ludwin Massey is a 28 year old male with a history of atrial septal defect, ostium primum status post repair who presents with nausea and vomiting. The patient reports that this morning he had a minor episode of diarrhea. He states that he was fine all day and then around 1900 tonight, he suddenly became very nauseous and began vomiting. Since 1900 tonight, the patient reports he has vomited 7-8 times. He describes this vomiting as being so severe that he almost \"blacked out\" and  \"couldn't feel [his] hands or feet or keep [his] body temperature up.\" The patient also notes that since the repair of his ASD, he has had cardiac rhythm issues when he squats down that rarely last more than 30 seconds. He states his cardiologist is aware of these issues. Tonight, after significant vomiting and fluid loss, the patient states he felt these cardiac rhythm issues be triggered. The patient denies any fevers. Of note, the patient's wife also currently reports to the ED for similar symptoms of nausea and vomiting and the patient states that their daughter vomited last night. The patient denies eating any new or different foods recently.     Allergies:  No known drug allergies     Medications:    Magnesium-Chelated Zinc PO  Toprol-XL  Ambien    Past Medical History:    Congenital cleft leaflet of mitral valve  Benign Essential Hypertension  Atrial septal defect, ostium primum s/p repair    Past Surgical History:    Repair atrial septal defect, ostium primum s/p repair    Family History:    Hypertension  DVT  WPW  Thrombosis  Colon Cancer  Migraines  Dementia  Mitral valve prolapse    Social History:  Smoking Status: Never Smoker  Alcohol Use: Rarely  Patient presents with his wife.  Marital Status:    Patient [facility]     Review of Systems   Constitutional: Negative for chills and fever.   Respiratory: " "Negative for cough and shortness of breath.    Cardiovascular: Positive for palpitations. Negative for chest pain.   Gastrointestinal: Positive for diarrhea, nausea and vomiting.   All other systems reviewed and are negative.    Physical Exam     Patient Vitals for the past 24 hrs:   BP Temp Temp src Pulse Heart Rate Resp SpO2 Height Weight   11/11/18 0201 - - - - - - 98 % - -   11/11/18 0200 136/73 - - - - - - - -   11/11/18 0146 - - - - - - 100 % - -   11/11/18 0145 137/69 - - - - - - - -   11/11/18 0130 137/72 - - - - - 100 % - -   11/11/18 0115 134/77 - - - 106 18 100 % - -   11/11/18 0100 131/74 - - - 106 18 - - -   11/11/18 0045 129/79 - - - 108 23 100 % - -   11/11/18 0024 124/76 98.9  F (37.2  C) Temporal 117 - 18 100 % 1.778 m (5' 10\") 77.1 kg (170 lb)     Physical Exam  Constitutional:  Oriented to person, place, and time.  HENT:   Head:    Normocephalic.   Mouth/Throat:   Oropharynx is clear and moist.   Eyes:    EOM are normal. Pupils are equal, round, and reactive to light.   Neck:    Neck supple.   Cardiovascular:  Normal rate, regular rhythm and normal heart sounds.      Exam reveals no gallop and no friction rub.       No murmur heard.  Pulmonary/Chest:  Effort normal and breath sounds normal.      No respiratory distress. No wheezes. No rales.      No reproducible chest wall pain.  Abdominal:   Soft. No distension. No tenderness. No rebound and no guarding.   Musculoskeletal:  Normal range of motion.   Neurological:   Alert and oriented to person, place, and time.           Moves all 4 extremities spontaneously    Skin:    No rash noted. No pallor.     Emergency Department Course     ECG (0:31:04):  Rate 120 bpm. IN interval 196 ms. QRS duration 78 ms. QT/QTc 294/415 ms. P-R-T axes 29 -34 94.   Sinus tachycardia.  Possible Left atrial enlargement.  Left axis deviation.  Septal infarct, age undetermined.  ST & T wave abnormality, consider lateral ischemia.  Abnormal ECG.  Interpreted at 0041 by " Bassem Sousa MD.    Laboratory:    CBC:  (L), Absolute Lymphocytes 0.5 (L), o/w  AWNL (WBC 7.1, HGB 15.6)    CMP: Glucose 137 (H), o/w AWNL (Creatinine 1.03)    Lipase: 172    Interventions:    0107: Lactated Ringers 1L IV Bolus  0107: Zofran 4 mg IV    Emergency Department Course:  Past medical records, nursing notes, and vitals reviewed.  0054: I performed an exam of the patient and obtained history, as documented above.    IV inserted and blood drawn.    0138: I rechecked the patient. Findings and plan explained to the Patient. Patient discharged home with instructions regarding supportive care, medications, and reasons to return. The importance of close follow-up was reviewed.     Impression & Plan      Medical Decision Making:  Ludwin Massey is a 28 year old male who presents for evaluation of nausea, vomiting and diarrhea.  There are ill contacts.  I considered a broad differential diagnosis for this patient including viral gastroenteritis, bacterial infection of the large intestine (salmonella, shigella, campylobacter, e coli, etc), bowel obstruction, intra-abdominal infection such as colitis, food poisoning, cholecystitis, UTI, pyelonephritis, appendicitis, etc.  There are no signs of worrisome intra-abdominal pathologies detected during the visit today.  He has a completely benign abdominal exam without rebound, guarding, or marked tenderness to palpation.  Supportive outpatient management is therefore indicated. No indication for stool studies at this time.  No indication for CT at this time.  He passed oral challenge here in ED. It was discussed to return to the ED for blood in stool, increasing pain, or fevers more than 102.   Feels much improved after interventions in ED.     Diagnosis:    ICD-10-CM   1. Acute gastroenteritis K52.9       Disposition:  Discharged to home.    Discharge Medications:  Discharge Medication List as of 11/11/2018  1:40 AM      START taking these medications     Details   ondansetron (ZOFRAN ODT) 4 MG ODT tab Take 1 tablet (4 mg) by mouth every 8 hours as needed for nausea, Disp-10 tablet, R-0, Local Print             Macy Pinto  11/11/2018   [facility] EMERGENCY DEPARTMENT  I, Macy Pinto, am serving as a scribe at 12:54 AM on 11/11/2018 to document services personally performed by Bassem Sousa MD based on my observations and the provider's statements to me.        Bassem Sousa MD  11/11/18 0433

## 2018-11-11 NOTE — ED AVS SNAPSHOT
[facility] Emergency Department    [address]    [facility] [address]    Phone:  [phone]    Fax:  [phone]                                       Ludwin Massey   MRN: [id number]    Department:  [facility] Emergency Department   Date of Visit:  11/11/2018           Patient Information     Date Of Birth          1990        Your diagnoses for this visit were:     Acute gastroenteritis        You were seen by Bassem Sousa MD.      Follow-up Information     Follow up with Tamiko Flannery APRN CNP. Call in 3 days.    Specialty:  Nurse Practitioner    Contact information:    [address]  [facility] [address]  [phone]          Discharge Instructions       Discharge Instructions  Gastroenteritis    You have been seen today for vomiting and diarrhea, called gastroenteritis or the stomach flu. This is usually caused by a virus, but some bacteria, parasites, medicines or other medical conditions can cause similar symptoms. At this time your doctor does not find that your vomiting and diarrhea is a sign of anything dangerous or life-threatening.  However, sometimes the signs of serious illness do not show up right away.  If you have new or worse symptoms, you may need to be seen again in the Emergency Department or by your primary doctor. Remember that serious problems like appendicitis can look like gastroenteritis at first.       Return to the Emergency Department if:    You keep throwing up and you are not able to keep liquids down.    You feel you are getting dehydrated, such as being very thirsty, not urinating at least every 8-12 hours, or feeling faint or lightheaded.     You develop a new fever, or your fever continues for more than 2 days.    You have belly pain that seems worse than cramps, is in one spot, or is getting worse over time.     You have blood in your vomit or in your diarrhea.    You feel very weak.    You are not starting to  improve within 24 hours of your visit here.    What can I do to help myself?    The most important thing to do is to drink clear liquids.  If you have been vomiting a lot, it is best to have only small, frequent sips of liquids.  Drinking too much at once may cause more vomiting. If you are vomiting often, you must replace minerals, sodium and potassium lost with your illness. Pedialyte  and sports drinks can help you replace these minerals.  You can also drink clear liquids such as water, weak tea, apple juice, and 7-Up . Avoid acid liquids (orange), caffeine (coffee) or alcohol. Do not drink milk until you no longer have diarrhea.    After liquids are staying down, you may start eating mild foods. Soda crackers, toast, plain noodles, gelatin, applesauce and bananas are good first choices.  Avoid foods that have acid, are spicy, fatty or fibrous (such as meats, coarse grains, vegetables). You may start eating these foods again in about 3 days when you are better.    Sometimes treatment includes prescription medicine to prevent nausea and vomiting and to prevent diarrhea. If your doctor prescribes these for you, take them as directed.    Nonprescription medicine is available for the treatment of diarrhea and can be very effective.  If you use it, make sure you use the dose recommended on the package. Avoid Lomotil . Check with your healthcare provider before you use any medicine for diarrhea.    Don t take ibuprofen, or other nonsteroidal anti-inflammatory medicines without checking with your healthcare provider.  If you were given a prescription for medicine here today, be sure to read all of the information (including the package insert) that comes with your prescription.  This will include important information about the medicine, its side effects, and any warnings that you need to know about.  The pharmacist who fills the prescription can provide more information and answer questions you may have about the  medicine.  If you have questions or concerns that the pharmacist cannot address, please call or return to the Emergency Department.   Opioid Medication Information    Pain medications are among the most commonly prescribed medicines, so we are including this information for all our patients. If you did not receive pain medication or get a prescription for pain medicine, you can ignore it.     You may have been given a prescription for an opioid (narcotic) pain medicine and/or have received a pain medicine while here in the Emergency Department. These medicines can make you drowsy or impaired. You must not drive, operate dangerous equipment, or engage in any other dangerous activities while taking these medications. If you drive while taking these medications, you could be arrested for DUI, or driving under the influence. Do not drink any alcohol while you are taking these medications.     Opioid pain medications can cause addiction. If you have a history of chemical dependency of any type, you are at a higher risk of becoming addicted to pain medications.  Only take these prescribed medications to treat your pain when all other options have been tried. Take it for as short a time and as few doses as possible. Store your pain pills in a secure place, as they are frequently stolen and provide a dangerous opportunity for children or visitors in your house to start abusing these powerful medications. We will not replace any lost or stolen medicine.  As soon as your pain is better, you should flush all your remaining medication.     Many prescription pain medications contain Tylenol  (acetaminophen), including Vicodin , Tylenol #3 , Norco , Lortab , and Percocet .  You should not take any extra pills of Tylenol  if you are using these prescription medications or you can get very sick.  Do not ever take more than 3000 mg of acetaminophen in any 24 hour period.    All opioids tend to cause constipation. Drink plenty of  water and eat foods that have a lot of fiber, such as fruits, vegetables, prune juice, apple juice and high fiber cereal.  Take a laxative if you don t move your bowels at least every other day. Miralax , Milk of Magnesia, Colace , or Senna  can be used to keep you regular.      Remember that you can always come back to the Emergency Department if you are not able to see your regular doctor in the amount of time listed above, if you get any new symptoms, or if there is anything that worries you.        24 Hour Appointment Hotline       To make an appointment at any [facility], call [phone] ([phone]). If you don't have a family doctor or clinic, we will help you find one. [facility] are conveniently located to serve the needs of you and your family.             Review of your medicines      START taking        Dose / Directions Last dose taken    ondansetron 4 MG ODT tab   Commonly known as:  ZOFRAN ODT   Dose:  4 mg   Quantity:  10 tablet        Take 1 tablet (4 mg) by mouth every 8 hours as needed for nausea   Refills:  0          Our records show that you are taking the medicines listed below. If these are incorrect, please call your family doctor or clinic.        Dose / Directions Last dose taken    MAGNESIUM-CHELATED ZINC PO   Dose:  3 tablet        Take 3 tablets by mouth daily   Refills:  0        metoprolol succinate 50 MG 24 hr tablet   Commonly known as:  TOPROL-XL   Dose:  50 mg   Quantity:  90 tablet        Take 1 tablet (50 mg) by mouth daily   Refills:  3        zolpidem 10 MG tablet   Commonly known as:  AMBIEN   Quantity:  1 tablet        Take tablet by mouth 15 minutes prior to sleep, for Sleep Study   Refills:  0                Prescriptions were sent or printed at these locations (1 Prescription)                   Other Prescriptions                Printed at Department/Unit printer (1 of 1)         ondansetron (ZOFRAN ODT) 4 MG ODT tab                Procedures and tests  performed during your visit     CBC with platelets differential    Comprehensive metabolic panel    EKG 12-lead, tracing only    Lipase    Peripheral IV: Standard      Orders Needing Specimen Collection     None      Pending Results     Date and Time Order Name Status Description    11/11/2018 0026 EKG 12-lead, tracing only Preliminary             Pending Culture Results     No orders found from 11/9/2018 to 11/12/2018.            Pending Results Instructions     If you had any lab results that were not finalized at the time of your Discharge, you can call the ED Lab Result RN at [phone]. You will be contacted by this team for any positive Lab results or changes in treatment. The nurses are available 7 days a week from 10A to 6:30P.  You can leave a message 24 hours per day and they will return your call.        Test Results From Your Hospital Stay        11/11/2018  1:18 AM      Component Results     Component Value Ref Range & Units Status    WBC 7.1 4.0 - 11.0 10e9/L Final    RBC Count 4.91 4.4 - 5.9 10e12/L Final    Hemoglobin 15.6 13.3 - 17.7 g/dL Final    Hematocrit 44.9 40.0 - 53.0 % Final    MCV 91 78 - 100 fl Final    MCH 31.8 26.5 - 33.0 pg Final    MCHC 34.7 31.5 - 36.5 g/dL Final    RDW 12.3 10.0 - 15.0 % Final    Platelet Count 143 (L) 150 - 450 10e9/L Final    Diff Method Automated Method  Final    % Neutrophils 85.4 % Final    % Lymphocytes 6.7 % Final    % Monocytes 6.5 % Final    % Eosinophils 0.7 % Final    % Basophils 0.4 % Final    % Immature Granulocytes 0.3 % Final    Nucleated RBCs 0 0 /100 Final    Absolute Neutrophil 6.1 1.6 - 8.3 10e9/L Final    Absolute Lymphocytes 0.5 (L) 0.8 - 5.3 10e9/L Final    Absolute Monocytes 0.5 0.0 - 1.3 10e9/L Final    Absolute Eosinophils 0.1 0.0 - 0.7 10e9/L Final    Absolute Basophils 0.0 0.0 - 0.2 10e9/L Final    Abs Immature Granulocytes 0.0 0 - 0.4 10e9/L Final    Absolute Nucleated RBC 0.0  Final         11/11/2018  1:35 AM      Component Results      Component Value Ref Range & Units Status    Sodium 137 133 - 144 mmol/L Final    Potassium 4.0 3.4 - 5.3 mmol/L Final    Chloride 102 94 - 109 mmol/L Final    Carbon Dioxide 29 20 - 32 mmol/L Final    Anion Gap 6 3 - 14 mmol/L Final    Glucose 137 (H) 70 - 99 mg/dL Final    Urea Nitrogen 15 7 - 30 mg/dL Final    Creatinine 1.03 0.66 - 1.25 mg/dL Final    GFR Estimate 85 >60 mL/min/1.7m2 Final    Non  GFR Calc    GFR Estimate If Black >90 >60 mL/min/1.7m2 Final    African American GFR Calc    Calcium 8.7 8.5 - 10.1 mg/dL Final    Bilirubin Total 1.1 0.2 - 1.3 mg/dL Final    Albumin 4.1 3.4 - 5.0 g/dL Final    Protein Total 7.0 6.8 - 8.8 g/dL Final    Alkaline Phosphatase 53 40 - 150 U/L Final    ALT 59 0 - 70 U/L Final    AST 39 0 - 45 U/L Final         11/11/2018  1:35 AM      Component Results     Component Value Ref Range & Units Status    Lipase 172 73 - 393 U/L Final                Clinical Quality Measure: Blood Pressure Screening     Your blood pressure was checked while you were in the emergency department today. The last reading we obtained was  BP: 134/77 . Please read the guidelines below about what these numbers mean and what you should do about them.  If your systolic blood pressure (the top number) is less than 120 and your diastolic blood pressure (the bottom number) is less than 80, then your blood pressure is normal. There is nothing more that you need to do about it.  If your systolic blood pressure (the top number) is 120-139 or your diastolic blood pressure (the bottom number) is 80-89, your blood pressure may be higher than it should be. You should have your blood pressure rechecked within a year by a primary care provider.  If your systolic blood pressure (the top number) is 140 or greater or your diastolic blood pressure (the bottom number) is 90 or greater, you may have high blood pressure. High blood pressure is treatable, but if left untreated over time it can put you at risk  for heart attack, stroke, or kidney failure. You should have your blood pressure rechecked by a primary care provider within the next 4 weeks.  If your provider in the emergency department today gave you specific instructions to follow-up with your doctor or provider even sooner than that, you should follow that instruction and not wait for up to 4 weeks for your follow-up visit.        Thank you for choosing [facility]       Thank you for choosing [facility] for your care. Our goal is always to provide you with excellent care. Hearing back from our patients is one way we can continue to improve our services. Please take a few minutes to complete the written survey that you may receive in the mail after you visit with us. Thank you!        [facility] Information     [facility] gives you secure access to your electronic health record. If you see a primary care provider, you can also send messages to your care team and make appointments. If you have questions, please call your primary care clinic.  If you do not have a primary care provider, please call [phone] and they will assist you.        Care EveryWhere ID     This is your Care EveryWhere ID. This could be used by other organizations to access your [facility] medical records  [id number]        Equal Access to Services     BIPIN URIARTE : Rusty Johnson, evie dyer, esau yates. So [facility] [phone].    ATENCIÓN: Si habla español, tiene a jones disposición servicios gratuitos de asistencia lingüística. [facility] [phone].    We comply with applicable federal civil rights laws and [place] laws. We do not discriminate on the basis of race, color, national origin, age, disability, sex, sexual orientation, or gender identity.            After Visit Summary       This is your record. Keep this with you and show to your community pharmacist(s) and doctor(s) at your next visit.

## 2018-11-11 NOTE — ED TRIAGE NOTES
Pt w/ N/V for last few hours, others at home sick with same.  Pt with cardiac hx also concerned over possible arrythmia.

## 2018-11-11 NOTE — ED AVS SNAPSHOT
[facility] Emergency Department    [address]    [facility] [address]    Phone:  [phone]    Fax:  [phone]                                       Ludwin Massey   MRN: [id number]    Department:  [facility] Emergency Department   Date of Visit:  11/11/2018           After Visit Summary Signature Page     I have received my discharge instructions, and my questions have been answered. I have discussed any challenges I see with this plan with the nurse or doctor.    ..........................................................................................................................................  Patient/Patient Representative Signature      ..........................................................................................................................................  Patient Representative Print Name and Relationship to Patient    ..................................................               ................................................  Date                                   Time    ..........................................................................................................................................  Reviewed by Signature/Title    ...................................................              ..............................................  Date                                               Time          22EPIC Rev 08/18

## 2018-11-12 LAB — INTERPRETATION ECG - MUSE: NORMAL

## 2018-11-15 ENCOUNTER — MYC REFILL (OUTPATIENT)
Dept: CARDIOLOGY | Facility: CLINIC | Age: 28
End: 2018-11-15

## 2018-11-15 DIAGNOSIS — Q21.20 ASD (ATRIAL SEPTAL DEFECT), OSTIUM PRIMUM: ICD-10-CM

## 2018-11-15 DIAGNOSIS — Z87.74 S/P CLOSURE OF CONGENITAL ATRIAL SEPTAL DEFECT BY PERCUTANEOUS TRANSCATHETER TECHNIQUE: ICD-10-CM

## 2018-11-15 DIAGNOSIS — Q23.82 CONGENITAL CLEFT LEAFLET OF MITRAL VALVE: ICD-10-CM

## 2018-11-15 DIAGNOSIS — I10 HYPERTENSION, UNSPECIFIED TYPE: ICD-10-CM

## 2018-11-15 RX ORDER — METOPROLOL SUCCINATE 25 MG/1
25 TABLET, EXTENDED RELEASE ORAL DAILY
Qty: 90 TABLET | Refills: 3 | Status: SHIPPED | OUTPATIENT
Start: 2018-11-15 | End: 2019-12-05

## 2018-11-15 NOTE — TELEPHONE ENCOUNTER
Message from [facility]:  Cassi Delacruz CMA Thu Nov 15, 2018 12:03 PM        ----- Message -----   From: Ludwin Massey   Sent: 11/15/2018 11:51 AM   To: Cardiology [facility]  Subject: Medication Renewal Request     Original authorizing provider: MD Ludwin Mosher would like a refill of the following medications:  metoprolol (TOPROL-XL) 50 MG 24 hr tablet [Rubina Rojo MD]    Preferred pharmacy: [facility] [address] [place] [address] [address] [facility] [address]    Comment:  I need to renew the metoprolol prescription, but my dose was lowered from 50mg to 25mg.

## 2018-12-18 ENCOUNTER — OFFICE VISIT (OUTPATIENT)
Dept: INTERNAL MEDICINE | Facility: CLINIC | Age: 28
End: 2018-12-18
Payer: COMMERCIAL

## 2018-12-18 ENCOUNTER — NURSE TRIAGE (OUTPATIENT)
Dept: NURSING | Facility: CLINIC | Age: 28
End: 2018-12-18

## 2018-12-18 VITALS
TEMPERATURE: 98.3 F | HEIGHT: 70 IN | SYSTOLIC BLOOD PRESSURE: 132 MMHG | WEIGHT: 166.4 LBS | RESPIRATION RATE: 16 BRPM | BODY MASS INDEX: 23.82 KG/M2 | OXYGEN SATURATION: 99 % | DIASTOLIC BLOOD PRESSURE: 86 MMHG | HEART RATE: 95 BPM

## 2018-12-18 DIAGNOSIS — J20.9 ACUTE BRONCHITIS, UNSPECIFIED ORGANISM: Primary | ICD-10-CM

## 2018-12-18 DIAGNOSIS — I10 BENIGN ESSENTIAL HYPERTENSION: ICD-10-CM

## 2018-12-18 DIAGNOSIS — J02.9 SORE THROAT: ICD-10-CM

## 2018-12-18 DIAGNOSIS — Q21.20 ASD (ATRIAL SEPTAL DEFECT), OSTIUM PRIMUM: ICD-10-CM

## 2018-12-18 PROCEDURE — 99214 OFFICE O/P EST MOD 30 MIN: CPT | Performed by: INTERNAL MEDICINE

## 2018-12-18 RX ORDER — AZITHROMYCIN 250 MG/1
TABLET, FILM COATED ORAL
Qty: 6 TABLET | Refills: 0 | Status: SHIPPED | OUTPATIENT
Start: 2018-12-18 | End: 2019-05-20

## 2018-12-18 ASSESSMENT — MIFFLIN-ST. JEOR: SCORE: 1726.04

## 2018-12-18 NOTE — TELEPHONE ENCOUNTER
For the last 3 weeks a sore throat that turned into a sinus infection.  For two weeks sinus cold and then passed for two days and came back with sore throat and sinus cold and this weekend feels it went to lungs.  Before cough was dry and yesterday coughing up mucus clear and greenish now.  Denies coughing up blood or fever but lungs are starting to hurt.      Reason for Disposition    [1] Continuous (nonstop) coughing interferes with work or school AND [2] no improvement using cough treatment per Care Advice    Additional Information    Negative: Severe difficulty breathing (e.g., struggling for each breath, speaks in single words)    Negative: Bluish lips, tongue, or face now    Negative: [1] Difficulty breathing AND [2] exposure to flames, smoke, or fumes    Negative: [1] Stridor AND [2] difficulty breathing    Negative: Sounds like a life-threatening emergency to the triager    Negative: Chest pain  (Exception: MILD central chest pain, present only when coughing)    Negative: Difficulty breathing    Negative: Patient sounds very sick or weak to the triager    Negative: [1] Coughed up blood AND [2] > 1 tablespoon (15 ml) (Exception: blood-tinged sputum)    Negative: Fever > 103 F (39.4 C)    Negative: [1] Fever > 101 F (38.3 C) AND [2] age > 60    Negative: [1] Fever > 101 F (38.3 C) AND [2] bedridden (e.g., nursing home patient, CVA, chronic illness, recovering from surgery)    Negative: [1] Fever > 100.5 F (38.1 C) AND [2] diabetes mellitus or weak immune system (e.g., HIV positive, cancer chemo, splenectomy, organ transplant, chronic steroids)    Negative: Wheezing is present    Negative: SEVERE coughing spells (e.g., whooping sound after coughing, vomiting after coughing)    Protocols used: COUGH - ACUTE PRODUCTIVE-ADULT-

## 2018-12-18 NOTE — NURSING NOTE
"/86 (BP Location: Left arm, Patient Position: Sitting, Cuff Size: Adult Regular)   Pulse 95   Temp 98.3  F (36.8  C) (Oral)   Resp 16   Ht 1.778 m (5' 10\")   Wt 75.5 kg (166 lb 6.4 oz)   SpO2 99%   BMI 23.88 kg/m    Sharmin Corbin CMA    "

## 2018-12-18 NOTE — PROGRESS NOTES
"Dr Celaya's note      Patient's instructions / PLAN:                                                        Plan:  1. Check blood pressure at home   2. Make a nurse only appointment to have the blood pressure check and bring you own machine  3. I suggest BP goal less than 120/70  4. Azithromycin 250 mg, take 2 tablets today, then 1 tablet a day for the next 4 days (  antibiotic)   5. Mucinex 1200 mg twice a day - over the counter ( avoid mucinex D)  6. Debrox ear drops for wax - L ear       ASSESSMENT & PLAN:                                                      (J20.9) Acute bronchitis, unspecified organism  (primary encounter diagnosis)  Comment:   Plan: azithromycin (ZITHROMAX) 250 MG tablet      (I10) Benign essential hypertension  Comment:   Plan: as above     (Q21.2) ASD (atrial septal defect), ostium primum s/p repair   Comment:   Plan: f/u w cardio        Chief complaint:                                                      URI    SUBJECTIVE:   Ludwin Massey is a 28 year old male who presents to clinic today for the following health issues:      *URI-Started off with sore throat, that seems to have improved, but he now has sinus issues and cough (was having thick green phlegm this morning, not a lot).    -- x 3 weeks  -- started with ST and sinuses  -- worse sinus cold I had  -- symptoms worse this week and moved down to the lung  -- this week cough dry and then phlegm: clear and then yellow and green   -- ant chest pain when he coughs  -- cough spells  at night  -- no asthma     Review of Systems:                                                      ROS: negative for fever, chills,  wheezes, chest pain, shortness of breath, vomiting, abdominal pain, leg swelling pos for cough, sinuses, as above       OBJECTIVE:             Physical exam:  Blood pressure 132/86, pulse 95, temperature 98.3  F (36.8  C), temperature source Oral, resp. rate 16, height 1.778 m (5' 10\"), weight 75.5 kg (166 lb 6.4 oz), SpO2 99 %. "   NAD, appears comfortable  Skin: no rashes   HEENT: PERRLA, EOMI, pink conjunctiva, anicteric sclerae, bilateral tympanic membranes are clinically normal, oropharynx ismild erythematous. Mild sinuses tenderness.   Neck: supple, no JVD,  No thyroidmegaly. Lymph nodes nonpalpable cervical and supraclavicular.  Chest: clear to auscultation bilaterally, good respiratory effort  Heart: S1 S2, RRR, no mgr appreciated  Abdomen: soft, not tender,   Extremities: no edema,   Neurologic: A, Ox3, no focal signs appreciated    PMHx: reviewed  History reviewed. No pertinent past medical history.   ASD, cleft MV, HTN  PSHx: reviewed  History reviewed. No pertinent surgical history.   Heart surgery 2003     Meds: reviewed  Current Outpatient Medications   Medication Sig Dispense Refill     Magnesium-Zinc (MAGNESIUM-CHELATED ZINC PO) Take 3 tablets by mouth daily       metoprolol succinate (TOPROL-XL) 25 MG 24 hr tablet Take 1 tablet (25 mg) by mouth daily 90 tablet 3       Soc Hx: reviewed  Fam Hx: reviewed          Leslie Celaya MD  Internal Medicine

## 2018-12-18 NOTE — PATIENT INSTRUCTIONS
Plan:  1. Check blood pressure at home   2. Make a nurse only appointment to have the blood pressure check and bring you own machine  3. I suggest BP goal less than 120/70  4. Azithromycin 250 mg, take 2 tablets today, then 1 tablet a day for the next 4 days (  antibiotic)   5. Mucinex 1200 mg twice a day - over the counter ( avoid mucinex D)  6. Debrox ear drops for wax - L ear

## 2019-05-20 ENCOUNTER — OFFICE VISIT (OUTPATIENT)
Dept: DERMATOLOGY | Facility: CLINIC | Age: 29
End: 2019-05-20
Payer: COMMERCIAL

## 2019-05-20 VITALS — HEART RATE: 98 BPM | OXYGEN SATURATION: 100 % | SYSTOLIC BLOOD PRESSURE: 139 MMHG | DIASTOLIC BLOOD PRESSURE: 88 MMHG

## 2019-05-20 DIAGNOSIS — D18.01 CHERRY ANGIOMA: ICD-10-CM

## 2019-05-20 DIAGNOSIS — D22.9 MULTIPLE NEVI: ICD-10-CM

## 2019-05-20 DIAGNOSIS — L81.4 LENTIGINES: Primary | ICD-10-CM

## 2019-05-20 DIAGNOSIS — D48.5 NEOPLASM OF UNCERTAIN BEHAVIOR OF SKIN: ICD-10-CM

## 2019-05-20 PROCEDURE — 11102 TANGNTL BX SKIN SINGLE LES: CPT | Performed by: PHYSICIAN ASSISTANT

## 2019-05-20 PROCEDURE — 88305 TISSUE EXAM BY PATHOLOGIST: CPT | Mod: TC | Performed by: PHYSICIAN ASSISTANT

## 2019-05-20 PROCEDURE — 99214 OFFICE O/P EST MOD 30 MIN: CPT | Mod: 25 | Performed by: PHYSICIAN ASSISTANT

## 2019-05-20 NOTE — LETTER
5/20/2019         RE: Ludwin Massey  [id number] [id number]        Dear Colleague,    Thank you for referring your patient, Ludwin Massey, to the [facility]. Please see a copy of my visit note below.    HPI:  Ludwin Massey is a 29 year old male patient here today for spots on scalp .  Patient states this has been present for a while?.  Patient reports the following symptoms: none .  Patient reports the following previous treatments: none.  Patient reports the following modifying factors: none.  Associated symptoms: none.  Patient has no other skin complaints today.  Remainder of the HPI, Meds, PMH, Allergies, FH, and SH was reviewed in chart.    Pertinent Hx:   No personal or family history of skin cancer    History reviewed. No pertinent past medical history.    History reviewed. No pertinent surgical history.     Family History   Problem Relation Age of Onset     Hypertension Father      Thrombosis Father 42        DVT     Other - See Comments Father         WPW     Thrombosis Mother      Colon Cancer Maternal Grandfather      Migraines Paternal Grandmother      Dementia Paternal Grandfather      Hypertension Brother      Other - See Comments Paternal Aunt         Mitral valve prolapse       Social History     Socioeconomic History     Marital status:      Spouse name: Not on file     Number of children: Not on file     Years of education: Not on file     Highest education level: Not on file   Occupational History     Not on file   Social Needs     Financial resource strain: Not on file     Food insecurity:     Worry: Not on file     Inability: Not on file     Transportation needs:     Medical: Not on file     Non-medical: Not on file   Tobacco Use     Smoking status: Never Smoker     Smokeless tobacco: Never Used   Substance and Sexual Activity     Alcohol use: Yes     Comment: very rare     Drug use: No     Sexual activity: Yes     Partners: Female    Lifestyle     Physical activity:     Days per week: Not on file     Minutes per session: Not on file     Stress: Not on file   Relationships     Social connections:     Talks on phone: Not on file     Gets together: Not on file     Attends Alevism service: Not on file     Active member of club or organization: Not on file     Attends meetings of clubs or organizations: Not on file     Relationship status: Not on file     Intimate partner violence:     Fear of current or ex partner: Not on file     Emotionally abused: Not on file     Physically abused: Not on file     Forced sexual activity: Not on file   Other Topics Concern     Parent/sibling w/ CABG, MI or angioplasty before 65F 55M? No   Social History Narrative     Not on file       Outpatient Encounter Medications as of 5/20/2019   Medication Sig Dispense Refill     metoprolol succinate (TOPROL-XL) 25 MG 24 hr tablet Take 1 tablet (25 mg) by mouth daily 90 tablet 3     Magnesium-Zinc (MAGNESIUM-CHELATED ZINC PO) Take 3 tablets by mouth daily       [DISCONTINUED] azithromycin (ZITHROMAX) 250 MG tablet Two tablets first day, then one tablet daily for four days. 6 tablet 0     No facility-administered encounter medications on file as of 5/20/2019.        Review Of Systems:  Skin: As above  Eyes: negative  Ears/Nose/Throat: negative  Respiratory: No shortness of breath, dyspnea on exertion, cough, or hemoptysis  Cardiovascular: negative  Gastrointestinal: negative  Genitourinary: negative  Musculoskeletal: negative  Neurologic: negative  Psychiatric: negative  Hematologic/Lymphatic/Immunologic: negative  Endocrine: negative      Objective:     /88   Pulse 98   SpO2 100%   Eyes: Conjunctivae/lids: Normal   ENT: Lips:  Normal  MSK: Normal  Cardiovascular: Peripheral edema none  Pulm: Breathing Normal  Neuro/Psych: Orientation: Normal; Mood/Affect: Normal, NAD, WDWN  Pt accompanied by: se;f  Following areas examined: Scalp, face, eyelids, lips, neck,  chest, abdomen, back, buttocks, and R&L upper and lower extremities.   Arce skin type:i   Findings:  Red smooth well-defined macules on trunk and extremities.  Well circumscribed macules and papules with symmetric color distribution on scalp trunk and extremities.  Tan WD smooth macules on face, neck, trunk, and extremities.  Irregularly shaped and pigmented macule on left lateral mid back 0.5cm    Assessment and Plan:     1) Cherry angiomas, Benign nevi, Lentigines     I discussed the specifics of tumor, prognosis, and genetics of benign lesions.  I explained that treatment of these lesions would be purely cosmetic and not medically neccessary.  I discussed with patient different removal options including excision, cryotherapy, cautery and /or laser.  Lesion may recur and/or may not completely resolve. May need additional treatment.     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  No complications and routine wound care.  May grow back and will get a scar. Based on lesion type may need to completely remove lesion. Patient will be notified in 7-10 days of results. Wound care directions given.    Signs and Symptoms of non-melanoma skin cancer and ABCDEs of melanoma reviewed with patient. Patient encouraged to perform monthly self skin exams and educated on how to perform them. UV precautions reviewed with patient. Patient was asked about new or changing moles/lesions on body.   Wear a sunscreen with at least SPF 30 on your face, ears, neck and V of the chest daily. Wear sunscreen on other areas of the body if those areas are exposed to the sun throughout the day. Sunscreens can contain physical and/or chemical blockers. Physical blockers are less likely to clog pores, these include zinc oxide and titanium dioxide. Reapply every two hour and after swimming. Sunscreen examples  include Neutrogena, CeraVe, Blue Lizard, Elta MD and many others.    Proper skin care from [facility]:    -Eliminate harsh soaps as they strip the natural oils from the skin, often resulting in dry itchy skin ( i.e. Dial, Zest, [place] Spring)  -Use mild soaps such as Cetaphil or Dove Sensitive Skin in the shower. You do not need to use soap on arms, legs, and trunk every time you shower unless visibly soiled.   -Avoid hot or cold showers.  -After showering, lightly dry off and apply moisturizing within 2-3 minutes. This will help trap moisture in the skin.   -Aggressive use of a moisturizer at least 1-2 times a day to the entire body (including -Vanicream, Cetaphil, Aquaphor or Cerave) and moisturize hands after every washing.  -We recommend using moisturizers that come in a tub that needs to be scooped out, not a pump. This has more of an oil base. It will hold moisture in your skin much better than a water base moisturizer. The above recommended are non-pore clogging.       Follow up in yearly FBE      Again, thank you for allowing me to participate in the care of your patient.        Sincerely,        Sheryl Falcon PA-C

## 2019-05-20 NOTE — PROGRESS NOTES
HPI:  Ludwin Massey is a 29 year old male patient here today for spots on scalp .  Patient states this has been present for a while?.  Patient reports the following symptoms: none .  Patient reports the following previous treatments: none.  Patient reports the following modifying factors: none.  Associated symptoms: none.  Patient has no other skin complaints today.  Remainder of the HPI, Meds, PMH, Allergies, FH, and SH was reviewed in chart.    Pertinent Hx:   No personal or family history of skin cancer    History reviewed. No pertinent past medical history.    History reviewed. No pertinent surgical history.     Family History   Problem Relation Age of Onset     Hypertension Father      Thrombosis Father 42        DVT     Other - See Comments Father         WPW     Thrombosis Mother      Colon Cancer Maternal Grandfather      Migraines Paternal Grandmother      Dementia Paternal Grandfather      Hypertension Brother      Other - See Comments Paternal Aunt         Mitral valve prolapse       Social History     Socioeconomic History     Marital status:      Spouse name: Not on file     Number of children: Not on file     Years of education: Not on file     Highest education level: Not on file   Occupational History     Not on file   Social Needs     Financial resource strain: Not on file     Food insecurity:     Worry: Not on file     Inability: Not on file     Transportation needs:     Medical: Not on file     Non-medical: Not on file   Tobacco Use     Smoking status: Never Smoker     Smokeless tobacco: Never Used   Substance and Sexual Activity     Alcohol use: Yes     Comment: very rare     Drug use: No     Sexual activity: Yes     Partners: Female   Lifestyle     Physical activity:     Days per week: Not on file     Minutes per session: Not on file     Stress: Not on file   Relationships     Social connections:     Talks on phone: Not on file     Gets together: Not on file     Attends Latter-day service:  Not on file     Active member of club or organization: Not on file     Attends meetings of clubs or organizations: Not on file     Relationship status: Not on file     Intimate partner violence:     Fear of current or ex partner: Not on file     Emotionally abused: Not on file     Physically abused: Not on file     Forced sexual activity: Not on file   Other Topics Concern     Parent/sibling w/ CABG, MI or angioplasty before 65F 55M? No   Social History Narrative     Not on file       Outpatient Encounter Medications as of 5/20/2019   Medication Sig Dispense Refill     metoprolol succinate (TOPROL-XL) 25 MG 24 hr tablet Take 1 tablet (25 mg) by mouth daily 90 tablet 3     Magnesium-Zinc (MAGNESIUM-CHELATED ZINC PO) Take 3 tablets by mouth daily       [DISCONTINUED] azithromycin (ZITHROMAX) 250 MG tablet Two tablets first day, then one tablet daily for four days. 6 tablet 0     No facility-administered encounter medications on file as of 5/20/2019.        Review Of Systems:  Skin: As above  Eyes: negative  Ears/Nose/Throat: negative  Respiratory: No shortness of breath, dyspnea on exertion, cough, or hemoptysis  Cardiovascular: negative  Gastrointestinal: negative  Genitourinary: negative  Musculoskeletal: negative  Neurologic: negative  Psychiatric: negative  Hematologic/Lymphatic/Immunologic: negative  Endocrine: negative      Objective:     /88   Pulse 98   SpO2 100%   Eyes: Conjunctivae/lids: Normal   ENT: Lips:  Normal  MSK: Normal  Cardiovascular: Peripheral edema none  Pulm: Breathing Normal  Neuro/Psych: Orientation: Normal; Mood/Affect: Normal, NAD, WDWN  Pt accompanied by: se;f  Following areas examined: Scalp, face, eyelids, lips, neck, chest, abdomen, back, buttocks, and R&L upper and lower extremities.   Arce skin type:i   Findings:  Red smooth well-defined macules on trunk and extremities.  Well circumscribed macules and papules with symmetric color distribution on scalp trunk and  extremities.  Barry WD smooth macules on face, neck, trunk, and extremities.  Irregularly shaped and pigmented macule on left lateral mid back 0.5cm    Assessment and Plan:     1) Cherry angiomas, Benign nevi, Lentigines     I discussed the specifics of tumor, prognosis, and genetics of benign lesions.  I explained that treatment of these lesions would be purely cosmetic and not medically neccessary.  I discussed with patient different removal options including excision, cryotherapy, cautery and /or laser.  Lesion may recur and/or may not completely resolve. May need additional treatment.     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  No complications and routine wound care.  May grow back and will get a scar. Based on lesion type may need to completely remove lesion. Patient will be notified in 7-10 days of results. Wound care directions given.    Signs and Symptoms of non-melanoma skin cancer and ABCDEs of melanoma reviewed with patient. Patient encouraged to perform monthly self skin exams and educated on how to perform them. UV precautions reviewed with patient. Patient was asked about new or changing moles/lesions on body.   Wear a sunscreen with at least SPF 30 on your face, ears, neck and V of the chest daily. Wear sunscreen on other areas of the body if those areas are exposed to the sun throughout the day. Sunscreens can contain physical and/or chemical blockers. Physical blockers are less likely to clog pores, these include zinc oxide and titanium dioxide. Reapply every two hour and after swimming. Sunscreen examples include Neutrogena, CeraVe, Blue Lizard, Elta MD and many others.    Proper skin care from [facility]:    -Eliminate harsh soaps as they strip the natural oils from the skin, often resulting in dry itchy skin ( i.e. Dial, Zest, [place] Spring)  -Use  mild soaps such as Cetaphil or Dove Sensitive Skin in the shower. You do not need to use soap on arms, legs, and trunk every time you shower unless visibly soiled.   -Avoid hot or cold showers.  -After showering, lightly dry off and apply moisturizing within 2-3 minutes. This will help trap moisture in the skin.   -Aggressive use of a moisturizer at least 1-2 times a day to the entire body (including -Vanicream, Cetaphil, Aquaphor or Cerave) and moisturize hands after every washing.  -We recommend using moisturizers that come in a tub that needs to be scooped out, not a pump. This has more of an oil base. It will hold moisture in your skin much better than a water base moisturizer. The above recommended are non-pore clogging.       Follow up in yearly FBE

## 2019-05-20 NOTE — PATIENT INSTRUCTIONS
Proper skin care from [facility]:    -Eliminate harsh soaps as they strip the natural oils from the skin, often resulting in dry itchy skin ( i.e. Dial, Zest, [place] Spring)  -Use mild soaps such as Cetaphil or Dove Sensitive Skin in the shower. You do not need to use soap on arms, legs, and trunk every time you shower unless visibly soiled.   -Avoid hot or cold showers.  -After showering, lightly dry off and apply moisturizing within 2-3 minutes. This will help trap moisture in the skin.   -Aggressive use of a moisturizer at least 1-2 times a day to the entire body (including -Vanicream, Cetaphil, Aquaphor or Cerave) and moisturize hands after every washing.  -We recommend using moisturizers that come in a tub that needs to be scooped out, not a pump. This has more of an oil base. It will hold moisture in your skin much better than a water base moisturizer. The above recommended are non-pore clogging.                    Wound Care Instructions     FOR SUPERFICIAL WOUNDS     [facility] [phone]    [facility] [phone]          AFTER 24 HOURS YOU SHOULD REMOVE THE BANDAGE AND BEGIN DAILY DRESSING CHANGES AS FOLLOWS:     1) Remove Dressing.     2) Clean and dry the area with tap water using a Q-tip or sterile gauze pad.     3) Apply Vaseline, Aquaphor, Polysporin ointment or Bacitracin ointment over entire wound.  Do NOT use Neosporin ointment.     4) Cover the wound with a band-aid, or a sterile non-stick gauze pad and micropore paper tape      REPEAT THESE INSTRUCTIONS AT LEAST ONCE A DAY UNTIL THE WOUND HAS COMPLETELY HEALED.    It is an old wives tale that a wound heals better when it is exposed to air and allowed to dry out. The wound will heal faster with a better cosmetic result if it is kept moist with ointment and covered with a bandage.    **Do not let the wound dry out.**      Supplies Needed:      *Cotton tipped applicators (Q-tips)    *Polysporin Ointment or  Bacitracin Ointment (NOT NEOSPORIN)    *Band-aids or non-stick gauze pads and micropore paper tape.      PATIENT INFORMATION:    During the healing process you will notice a number of changes. All wounds develop a small halo of redness surrounding the wound.  This means healing is occurring. Severe itching with extensive redness usually indicates sensitivity to the ointment or bandage tape used to dress the wound.  You should call our office if this develops.      Swelling  and/or discoloration around your surgical site is common, particularly when performed around the eye.    All wounds normally drain.  The larger the wound the more drainage there will be.  After 7-10 days, you will notice the wound beginning to shrink and new skin will begin to grow.  The wound is healed when you can see skin has formed over the entire area.  A healed wound has a healthy, shiny look to the surface and is red to dark pink in color to normalize.  Wounds may take approximately 4-6 weeks to heal.  Larger wounds may take 6-8 weeks.  After the wound is healed you may discontinue dressing changes.    You may experience a sensation of tightness as your wound heals. This is normal and will gradually subside.    Your healed wound may be sensitive to temperature changes. This sensitivity improves with time, but if you re having a lot of discomfort, try to avoid temperature extremes.    Patients frequently experience itching after their wound appears to have healed because of the continue healing under the skin.  Plain Vaseline will help relieve the itching.        POSSIBLE COMPLICATIONS    BLEEDIN. Leave the bandage in place.  2. Use tightly rolled up gauze or a cloth to apply direct pressure over the bandage for 30  minutes.  3. Reapply pressure for an additional 30 minutes if necessary  4. Use additional gauze and tape to maintain pressure once the bleeding has stopped.

## 2019-05-24 LAB — COPATH REPORT: NORMAL

## 2019-10-03 ENCOUNTER — OFFICE VISIT (OUTPATIENT)
Dept: FAMILY MEDICINE | Facility: CLINIC | Age: 29
End: 2019-10-03
Payer: COMMERCIAL

## 2019-10-03 VITALS — SYSTOLIC BLOOD PRESSURE: 130 MMHG | DIASTOLIC BLOOD PRESSURE: 86 MMHG

## 2019-10-03 DIAGNOSIS — D48.5 NEOPLASM OF UNCERTAIN BEHAVIOR OF SKIN: Primary | ICD-10-CM

## 2019-10-03 PROCEDURE — 99213 OFFICE O/P EST LOW 20 MIN: CPT | Mod: 25 | Performed by: PHYSICIAN ASSISTANT

## 2019-10-03 PROCEDURE — 11102 TANGNTL BX SKIN SINGLE LES: CPT | Performed by: PHYSICIAN ASSISTANT

## 2019-10-03 PROCEDURE — 88342 IMHCHEM/IMCYTCHM 1ST ANTB: CPT | Mod: TC | Performed by: PHYSICIAN ASSISTANT

## 2019-10-03 PROCEDURE — 88305 TISSUE EXAM BY PATHOLOGIST: CPT | Mod: TC | Performed by: PHYSICIAN ASSISTANT

## 2019-10-03 NOTE — PROGRESS NOTES
HPI:  Ludwin Massey is a 29 year old male patient here today for recheck biopsy site from may 2019. Has not healed over, still crusty .  Patient states this has been present for months.  Patient reports the following symptoms: not healing .  Patient reports the following previous treatments: tangential removal. Biopsy result -compound melanocytic nevus.  Patient reports the following modifying factors: none.  Associated symptoms: none.  Patient has no other skin complaints today.  Remainder of the HPI, Meds, PMH, Allergies, FH, and SH was reviewed in chart.    Pertinent Hx:   No personal or family history of skin cancer    History reviewed. No pertinent past medical history.    History reviewed. No pertinent surgical history.     Family History   Problem Relation Age of Onset     Hypertension Father      Thrombosis Father 42        DVT     Other - See Comments Father         WPW     Thrombosis Mother      Colon Cancer Maternal Grandfather      Migraines Paternal Grandmother      Dementia Paternal Grandfather      Hypertension Brother      Other - See Comments Paternal Aunt         Mitral valve prolapse       Social History     Socioeconomic History     Marital status:      Spouse name: Not on file     Number of children: Not on file     Years of education: Not on file     Highest education level: Not on file   Occupational History     Not on file   Social Needs     Financial resource strain: Not on file     Food insecurity:     Worry: Not on file     Inability: Not on file     Transportation needs:     Medical: Not on file     Non-medical: Not on file   Tobacco Use     Smoking status: Never Smoker     Smokeless tobacco: Never Used   Substance and Sexual Activity     Alcohol use: Yes     Comment: very rare     Drug use: No     Sexual activity: Yes     Partners: Female   Lifestyle     Physical activity:     Days per week: Not on file     Minutes per session: Not on file     Stress: Not on file   Relationships      Social connections:     Talks on phone: Not on file     Gets together: Not on file     Attends Restorationist service: Not on file     Active member of club or organization: Not on file     Attends meetings of clubs or organizations: Not on file     Relationship status: Not on file     Intimate partner violence:     Fear of current or ex partner: Not on file     Emotionally abused: Not on file     Physically abused: Not on file     Forced sexual activity: Not on file   Other Topics Concern     Parent/sibling w/ CABG, MI or angioplasty before 65F 55M? No   Social History Narrative     Not on file       Outpatient Encounter Medications as of 10/3/2019   Medication Sig Dispense Refill     metoprolol succinate (TOPROL-XL) 25 MG 24 hr tablet Take 1 tablet (25 mg) by mouth daily 90 tablet 3     Magnesium-Zinc (MAGNESIUM-CHELATED ZINC PO) Take 3 tablets by mouth daily       No facility-administered encounter medications on file as of 10/3/2019.        Review Of Systems:  Skin: As above  Eyes: negative  Ears/Nose/Throat: negative  Respiratory: No shortness of breath, dyspnea on exertion, cough, or hemoptysis  Cardiovascular: negative  Gastrointestinal: negative  Genitourinary: negative  Musculoskeletal: negative  Neurologic: negative  Psychiatric: negative  Hematologic/Lymphatic/Immunologic: negative  Endocrine: negative      Objective:     /86   Eyes: Conjunctivae/lids: Normal   ENT: Lips:  Normal  MSK: Normal  Cardiovascular: Peripheral edema none  Pulm: Breathing Normal  Neuro/Psych: Orientation: Normal; Mood/Affect: Normal, NAD, WDWN  Pt accompanied by: self  Following areas examined: face, back  Arce skin type:i   Findings:  Pink smooth patch with brown/black crusted patch with peripheral erythema 1.0cm  Assessment and Plan:  1) biopsy proven  Shave, left lateral mid back:   - Compound melanocytic nevus   Not healing. Crusting. scabbing.    TANGENTIAL BIOPSY:  After consent, anesthesia with LEC and prep,  tangential biopsy performed.  No complications and routine wound care.  May grow back and will get a scar. Based on lesion type may need to completely remove lesion. Patient will be notified in 7-10 days of results. Wound care directions given.      Follow up in prn

## 2019-10-03 NOTE — PATIENT INSTRUCTIONS
Wound Care Instructions     FOR SUPERFICIAL WOUNDS     [facility] [phone]    [facility] [phone]          AFTER 24 HOURS YOU SHOULD REMOVE THE BANDAGE AND BEGIN DAILY DRESSING CHANGES AS FOLLOWS:     1) Remove Dressing.     2) Clean and dry the area with tap water using a Q-tip or sterile gauze pad.     3) Apply Vaseline, Aquaphor, Polysporin ointment or Bacitracin ointment over entire wound.  Do NOT use Neosporin ointment.     4) Cover the wound with a band-aid, or a sterile non-stick gauze pad and micropore paper tape      REPEAT THESE INSTRUCTIONS AT LEAST ONCE A DAY UNTIL THE WOUND HAS COMPLETELY HEALED.    It is an old wives tale that a wound heals better when it is exposed to air and allowed to dry out. The wound will heal faster with a better cosmetic result if it is kept moist with ointment and covered with a bandage.    **Do not let the wound dry out.**      Supplies Needed:      *Cotton tipped applicators (Q-tips)    *Polysporin Ointment or Bacitracin Ointment (NOT NEOSPORIN)    *Band-aids or non-stick gauze pads and micropore paper tape.      PATIENT INFORMATION:    During the healing process you will notice a number of changes. All wounds develop a small halo of redness surrounding the wound.  This means healing is occurring. Severe itching with extensive redness usually indicates sensitivity to the ointment or bandage tape used to dress the wound.  You should call our office if this develops.      Swelling  and/or discoloration around your surgical site is common, particularly when performed around the eye.    All wounds normally drain.  The larger the wound the more drainage there will be.  After 7-10 days, you will notice the wound beginning to shrink and new skin will begin to grow.  The wound is healed when you can see skin has formed over the entire area.  A healed wound has a healthy, shiny look to the surface and is red to dark pink in color to  normalize.  Wounds may take approximately 4-6 weeks to heal.  Larger wounds may take 6-8 weeks.  After the wound is healed you may discontinue dressing changes.    You may experience a sensation of tightness as your wound heals. This is normal and will gradually subside.    Your healed wound may be sensitive to temperature changes. This sensitivity improves with time, but if you re having a lot of discomfort, try to avoid temperature extremes.    Patients frequently experience itching after their wound appears to have healed because of the continue healing under the skin.  Plain Vaseline will help relieve the itching.        POSSIBLE COMPLICATIONS    BLEEDIN. Leave the bandage in place.  2. Use tightly rolled up gauze or a cloth to apply direct pressure over the bandage for 30  minutes.  3. Reapply pressure for an additional 30 minutes if necessary  4. Use additional gauze and tape to maintain pressure once the bleeding has stopped.        Proper skin care from [facility]:    -Eliminate harsh soaps as they strip the natural oils from the skin, often resulting in dry itchy skin ( i.e. Dial, Zest, Roxy Spring)  -Use mild soaps such as Cetaphil or Dove Sensitive Skin in the shower. You do not need to use soap on arms, legs, and trunk every time you shower unless visibly soiled.   -Avoid hot or cold showers.  -After showering, lightly dry off and apply moisturizing within 2-3 minutes. This will help trap moisture in the skin.   -Aggressive use of a moisturizer at least 1-2 times a day to the entire body (including -Vanicream, Cetaphil, Aquaphor or Cerave) and moisturize hands after every washing.  -We recommend using moisturizers that come in a tub that needs to be scooped out, not a pump. This has more of an oil base. It will hold moisture in your skin much better than a water base moisturizer. The above recommended are non-pore clogging.      Wear a sunscreen with at least SPF 30 on your face, ears,  neck and V of the chest daily. Wear sunscreen on other areas of the body if those areas are exposed to the sun throughout the day. Sunscreens can contain physical and/or chemical blockers. Physical blockers are less likely to clog pores, these include zinc oxide and titanium dioxide. Reapply every two hour and after swimming. Sunscreen examples include Neutrogena, CeraVe, Blue Lizard, Elta MD and many others.    UV radiation  UVA radiation remains constant throughout the day and throughout the year. It is a longer wavelength than UVB and therefore penetrates deeper into the skin leading to immediate and delayed tanning, photoaging, and skin cancer. 70-80% of UVA and UVB radiation occurs between the hours of 10am-2pm.  UVB radiation  UVB radiation causes the most harmful effects and is more significant during the summer months. However, snow and ice can reflect UVB radiation leading to skin damage during the winter months as well. UVB radiation is responsible for tanning, burning, inflammation, delayed erythema (pinkness), pigmentation (brown spots), and skin cancer.

## 2019-10-03 NOTE — LETTER
10/3/2019         RE: Ludwin Massey  [address] [facility] [id number]        Dear Colleague,    Thank you for referring your patient, Ludwin Massey, to the [facility]. Please see a copy of my visit note below.    HPI:  Ludwin Massey is a 29 year old male patient here today for recheck biopsy site from may 2019. Has not healed over, still crusty .  Patient states this has been present for months.  Patient reports the following symptoms: not healing .  Patient reports the following previous treatments: tangential removal. Biopsy result -compound melanocytic nevus.  Patient reports the following modifying factors: none.  Associated symptoms: none.  Patient has no other skin complaints today.  Remainder of the HPI, Meds, PMH, Allergies, FH, and SH was reviewed in chart.    Pertinent Hx:   No personal or family history of skin cancer    History reviewed. No pertinent past medical history.    History reviewed. No pertinent surgical history.     Family History   Problem Relation Age of Onset     Hypertension Father      Thrombosis Father 42        DVT     Other - See Comments Father         WPW     Thrombosis Mother      Colon Cancer Maternal Grandfather      Migraines Paternal Grandmother      Dementia Paternal Grandfather      Hypertension Brother      Other - See Comments Paternal Aunt         Mitral valve prolapse       Social History     Socioeconomic History     Marital status:      Spouse name: Not on file     Number of children: Not on file     Years of education: Not on file     Highest education level: Not on file   Occupational History     Not on file   Social Needs     Financial resource strain: Not on file     Food insecurity:     Worry: Not on file     Inability: Not on file     Transportation needs:     Medical: Not on file     Non-medical: Not on file   Tobacco Use     Smoking status: Never Smoker     Smokeless tobacco: Never Used   Substance and Sexual Activity      Alcohol use: Yes     Comment: very rare     Drug use: No     Sexual activity: Yes     Partners: Female   Lifestyle     Physical activity:     Days per week: Not on file     Minutes per session: Not on file     Stress: Not on file   Relationships     Social connections:     Talks on phone: Not on file     Gets together: Not on file     Attends Congregation service: Not on file     Active member of club or organization: Not on file     Attends meetings of clubs or organizations: Not on file     Relationship status: Not on file     Intimate partner violence:     Fear of current or ex partner: Not on file     Emotionally abused: Not on file     Physically abused: Not on file     Forced sexual activity: Not on file   Other Topics Concern     Parent/sibling w/ CABG, MI or angioplasty before 65F 55M? No   Social History Narrative     Not on file       Outpatient Encounter Medications as of 10/3/2019   Medication Sig Dispense Refill     metoprolol succinate (TOPROL-XL) 25 MG 24 hr tablet Take 1 tablet (25 mg) by mouth daily 90 tablet 3     Magnesium-Zinc (MAGNESIUM-CHELATED ZINC PO) Take 3 tablets by mouth daily       No facility-administered encounter medications on file as of 10/3/2019.        Review Of Systems:  Skin: As above  Eyes: negative  Ears/Nose/Throat: negative  Respiratory: No shortness of breath, dyspnea on exertion, cough, or hemoptysis  Cardiovascular: negative  Gastrointestinal: negative  Genitourinary: negative  Musculoskeletal: negative  Neurologic: negative  Psychiatric: negative  Hematologic/Lymphatic/Immunologic: negative  Endocrine: negative      Objective:     /86   Eyes: Conjunctivae/lids: Normal   ENT: Lips:  Normal  MSK: Normal  Cardiovascular: Peripheral edema none  Pulm: Breathing Normal  Neuro/Psych: Orientation: Normal; Mood/Affect: Normal, NAD, WDWN  Pt accompanied by: self  Following areas examined: face, back  Arce skin type:i   Findings:  Pink smooth patch with brown/black  crusted patch with peripheral erythema 1.0cm  Assessment and Plan:  1) biopsy proven  Shave, left lateral mid back:   - Compound melanocytic nevus   Not healing. Crusting. scabbing.    TANGENTIAL BIOPSY:  After consent, anesthesia with LEC and prep, tangential biopsy performed.  No complications and routine wound care.  May grow back and will get a scar. Based on lesion type may need to completely remove lesion. Patient will be notified in 7-10 days of results. Wound care directions given.      Follow up in prn      Again, thank you for allowing me to participate in the care of your patient.        Sincerely,        Sheryl Falcon PA-C

## 2019-10-09 LAB — COPATH REPORT: NORMAL

## 2019-10-10 DIAGNOSIS — Q21.10 ASD (ATRIAL SEPTAL DEFECT): ICD-10-CM

## 2019-10-10 DIAGNOSIS — Q21.20 ASD (ATRIAL SEPTAL DEFECT), OSTIUM PRIMUM: ICD-10-CM

## 2019-10-10 DIAGNOSIS — Q23.82 CONGENITAL CLEFT LEAFLET OF MITRAL VALVE: Primary | ICD-10-CM

## 2019-10-14 ENCOUNTER — TELEPHONE (OUTPATIENT)
Dept: CARDIOLOGY | Facility: CLINIC | Age: 29
End: 2019-10-14

## 2019-10-14 NOTE — TELEPHONE ENCOUNTER
Left voice message for patient to give us a call to schedule his appointments. Left contact number - lenny cox

## 2019-11-05 ENCOUNTER — ALLIED HEALTH/NURSE VISIT (OUTPATIENT)
Dept: NURSING | Facility: CLINIC | Age: 29
End: 2019-11-05
Payer: COMMERCIAL

## 2019-11-05 ENCOUNTER — HEALTH MAINTENANCE LETTER (OUTPATIENT)
Age: 29
End: 2019-11-05

## 2019-11-05 DIAGNOSIS — Z23 NEED FOR PROPHYLACTIC VACCINATION AND INOCULATION AGAINST INFLUENZA: Primary | ICD-10-CM

## 2019-11-05 PROCEDURE — 99207 ZZC NO CHARGE NURSE ONLY: CPT

## 2019-11-05 PROCEDURE — 90686 IIV4 VACC NO PRSV 0.5 ML IM: CPT

## 2019-11-05 PROCEDURE — 90471 IMMUNIZATION ADMIN: CPT

## 2019-12-05 ENCOUNTER — MYC REFILL (OUTPATIENT)
Dept: CARDIOLOGY | Facility: CLINIC | Age: 29
End: 2019-12-05

## 2019-12-05 DIAGNOSIS — Q21.20 ASD (ATRIAL SEPTAL DEFECT), OSTIUM PRIMUM: ICD-10-CM

## 2019-12-05 DIAGNOSIS — Z87.74 S/P CLOSURE OF CONGENITAL ATRIAL SEPTAL DEFECT BY PERCUTANEOUS TRANSCATHETER TECHNIQUE: ICD-10-CM

## 2019-12-05 DIAGNOSIS — I10 HYPERTENSION, UNSPECIFIED TYPE: ICD-10-CM

## 2019-12-05 DIAGNOSIS — Q23.82 CONGENITAL CLEFT LEAFLET OF MITRAL VALVE: ICD-10-CM

## 2019-12-09 RX ORDER — METOPROLOL SUCCINATE 25 MG/1
25 TABLET, EXTENDED RELEASE ORAL DAILY
Qty: 90 TABLET | Refills: 0 | Status: SHIPPED | OUTPATIENT
Start: 2019-12-09 | End: 2020-03-02

## 2019-12-12 ENCOUNTER — HOSPITAL ENCOUNTER (OUTPATIENT)
Dept: CARDIOLOGY | Facility: CLINIC | Age: 29
Discharge: HOME | End: 2019-12-12
Attending: INTERNAL MEDICINE | Admitting: INTERNAL MEDICINE
Payer: COMMERCIAL

## 2019-12-12 ENCOUNTER — OFFICE VISIT (OUTPATIENT)
Dept: CARDIOLOGY | Facility: CLINIC | Age: 29
End: 2019-12-12
Payer: COMMERCIAL

## 2019-12-12 VITALS
HEIGHT: 70 IN | SYSTOLIC BLOOD PRESSURE: 142 MMHG | DIASTOLIC BLOOD PRESSURE: 90 MMHG | WEIGHT: 175.6 LBS | BODY MASS INDEX: 25.14 KG/M2 | HEART RATE: 80 BPM

## 2019-12-12 DIAGNOSIS — I10 HYPERTENSION, UNSPECIFIED TYPE: ICD-10-CM

## 2019-12-12 DIAGNOSIS — Q21.10 ASD (ATRIAL SEPTAL DEFECT): ICD-10-CM

## 2019-12-12 DIAGNOSIS — Q23.82 CONGENITAL CLEFT LEAFLET OF MITRAL VALVE: Primary | ICD-10-CM

## 2019-12-12 DIAGNOSIS — Q21.20 ASD (ATRIAL SEPTAL DEFECT), OSTIUM PRIMUM: ICD-10-CM

## 2019-12-12 DIAGNOSIS — Q23.82 CONGENITAL CLEFT LEAFLET OF MITRAL VALVE: ICD-10-CM

## 2019-12-12 DIAGNOSIS — R00.2 PALPITATIONS: ICD-10-CM

## 2019-12-12 LAB
ALBUMIN SERPL-MCNC: 4.3 G/DL (ref 3.4–5)
ALP SERPL-CCNC: 58 U/L (ref 40–150)
ALT SERPL W P-5'-P-CCNC: 22 U/L (ref 0–70)
ANION GAP SERPL CALCULATED.3IONS-SCNC: 5 MMOL/L (ref 3–14)
AST SERPL W P-5'-P-CCNC: 16 U/L (ref 0–45)
BASOPHILS # BLD AUTO: 0 10E9/L (ref 0–0.2)
BASOPHILS NFR BLD AUTO: 0.4 %
BILIRUB SERPL-MCNC: 0.6 MG/DL (ref 0.2–1.3)
BUN SERPL-MCNC: 11 MG/DL (ref 7–30)
CALCIUM SERPL-MCNC: 9.4 MG/DL (ref 8.5–10.1)
CHLORIDE SERPL-SCNC: 106 MMOL/L (ref 94–109)
CO2 SERPL-SCNC: 27 MMOL/L (ref 20–32)
CREAT SERPL-MCNC: 0.83 MG/DL (ref 0.66–1.25)
DIFFERENTIAL METHOD BLD: NORMAL
EOSINOPHIL # BLD AUTO: 0.1 10E9/L (ref 0–0.7)
EOSINOPHIL NFR BLD AUTO: 1 %
ERYTHROCYTE [DISTWIDTH] IN BLOOD BY AUTOMATED COUNT: 12.3 % (ref 10–15)
GFR SERPL CREATININE-BSD FRML MDRD: >90 ML/MIN/{1.73_M2}
GLUCOSE SERPL-MCNC: 100 MG/DL (ref 70–99)
HCT VFR BLD AUTO: 44.9 % (ref 40–53)
HGB BLD-MCNC: 15.4 G/DL (ref 13.3–17.7)
IMM GRANULOCYTES # BLD: 0 10E9/L (ref 0–0.4)
IMM GRANULOCYTES NFR BLD: 0.4 %
LYMPHOCYTES # BLD AUTO: 1.9 10E9/L (ref 0.8–5.3)
LYMPHOCYTES NFR BLD AUTO: 25.7 %
MCH RBC QN AUTO: 32 PG (ref 26.5–33)
MCHC RBC AUTO-ENTMCNC: 34.3 G/DL (ref 31.5–36.5)
MCV RBC AUTO: 93 FL (ref 78–100)
MONOCYTES # BLD AUTO: 0.5 10E9/L (ref 0–1.3)
MONOCYTES NFR BLD AUTO: 7.4 %
NEUTROPHILS # BLD AUTO: 4.7 10E9/L (ref 1.6–8.3)
NEUTROPHILS NFR BLD AUTO: 65.1 %
NRBC # BLD AUTO: 0 10*3/UL
NRBC BLD AUTO-RTO: 0 /100
PLATELET # BLD AUTO: 175 10E9/L (ref 150–450)
POTASSIUM SERPL-SCNC: 4.1 MMOL/L (ref 3.4–5.3)
PROT SERPL-MCNC: 7.7 G/DL (ref 6.8–8.8)
RBC # BLD AUTO: 4.82 10E12/L (ref 4.4–5.9)
SODIUM SERPL-SCNC: 138 MMOL/L (ref 133–144)
WBC # BLD AUTO: 7.2 10E9/L (ref 4–11)

## 2019-12-12 PROCEDURE — 93325 DOPPLER ECHO COLOR FLOW MAPG: CPT

## 2019-12-12 PROCEDURE — 93005 ELECTROCARDIOGRAM TRACING: CPT | Performed by: INTERNAL MEDICINE

## 2019-12-12 PROCEDURE — 99214 OFFICE O/P EST MOD 30 MIN: CPT | Performed by: INTERNAL MEDICINE

## 2019-12-12 PROCEDURE — 85025 COMPLETE CBC W/AUTO DIFF WBC: CPT | Performed by: INTERNAL MEDICINE

## 2019-12-12 PROCEDURE — 36415 COLL VENOUS BLD VENIPUNCTURE: CPT | Performed by: INTERNAL MEDICINE

## 2019-12-12 PROCEDURE — 80053 COMPREHEN METABOLIC PANEL: CPT | Performed by: INTERNAL MEDICINE

## 2019-12-12 RX ORDER — HYDROCHLOROTHIAZIDE 25 MG/1
25 TABLET ORAL DAILY
Qty: 90 TABLET | Refills: 3 | Status: SHIPPED | OUTPATIENT
Start: 2019-12-12 | End: 2020-12-04

## 2019-12-12 ASSESSMENT — MIFFLIN-ST. JEOR: SCORE: 1762.77

## 2019-12-12 NOTE — LETTER
12/12/2019    Tamiko Flannery, APRN CNP  [address]  [facility] [address]    RE: Ludwin Massey       Dear Colleague,    I had the pleasure of seeing Ludwin Massey in the [facility].    HPI and Plan:   I had the pleasure of seeing Ludwin Massey today in a shared adult cardiology clinic follow up. He is a pleasant 29 year old patient of Dr. Rojo.    Mr. Massey has a past medical history significant for primary atrial septal defect and cleft mitral valve diagnosed at 13 years of age.  He had surgery at the [facility] and was followed by Dr. Wang since his surgery in 2003.      When Dr. Rojo met Mr. Massey in 11/2017, his symptoms were primarily palpitations.   He continues to have palpations, they haven't really changed in frequency or duration over the last year. He notices them most when he stands, squatting abates them. They seem to occur most when he is dehydrated, has salty food, or the morning after he has had alcohol. Previous Holter and Ziopatch have not captured his symptoms. Previous ziopatch did show short runs of  SVTs and PVCs, but nothing of significance.   Previous TSH was normal.  CBC  today was unremarkable.     He does like to ride his bicycle, he has a way to do it indoors in the wintertime.  However he has not been doing it as much as he likes.  His blood pressure is a bit elevated today, 140/80 by my check.  Continues on Toprol-XL 25 mg daily.  He has not had any chest pain, shortness of breath, PND orthopnea.      His echocardiogram done before his office visit today looks unchanged from previous, there is prolapse of the mitral valve anterior leaflet. Mild (1+) mitral valve insufficiency. There is no ventricular level shunting. There is no atrial level shunting. Good left and right ventricular systolic function. No pericardial effusion.    Assessment and Plan  1. Palpitations.  These continue to be his primary concern.  They can happen 2  weeks apart, but then can occur in clusters, always subside with squatting.  We have not been able to capture them well on previous monitoring.  We discussed different options including having him come in for EKG if symptoms are on-going, doing a 30-day event monitor, or alternatively using some kind of watch, like an Apple watch that can capture an EKG.  He is going to look into the watches, and will call our office and let us know if he wants a 30 day event monitor instead.  He can certainly take an extra Toprol if he is having symptoms on a particular day.  2. Status post repair of primary atrial septal defect and cleft mitral valve 06/2003 at [facility].    3.  Mild mitral and tricuspid insufficiency, stable.   4.  P ossible celiac disease.   5.  Hypertension.   His blood pressure little bit elevated, ideally it should be less than 130 systolic.  I am going to add hydrochlorothiazide 25 mg daily.  He will get a BMP in 1 week's time.     Thank you for allowing me to care for Ludwin Massey today.    DEBBIE Andres, CNP  Cardiology    This is a shared visit between myself and DEBBIE Roy. I was present for all the visit. This patient was seen and examined by me. We discussed the patient and plan of care including medication changes. I agree with the mutually agreed upon plan outlined above.   Dr. Rubina Rojo MD    Voice recognition software was used for this note, I have reviewed this note, but errors may have been missed.    Orders Placed This Encounter   Procedures     Basic metabolic panel     Follow-Up With Adult Congenital and CV Genetics     EKG 12-lead complete w/read - Clinics (performed today)     Orders Placed This Encounter   Medications     hydrochlorothiazide (HYDRODIURIL) 25 MG tablet     Sig: Take 1 tablet (25 mg) by mouth daily     Dispense:  90 tablet     Refill:  3     Medications Discontinued During This Encounter   Medication Reason     Magnesium-Zinc  (MAGNESIUM-CHELATED ZINC PO) Stopped by Patient         CURRENT MEDICATIONS:  Current Outpatient Medications   Medication Sig Dispense Refill     hydrochlorothiazide (HYDRODIURIL) 25 MG tablet Take 1 tablet (25 mg) by mouth daily 90 tablet 3     metoprolol succinate ER (TOPROL-XL) 25 MG 24 hr tablet Take 1 tablet (25 mg) by mouth daily 90 tablet 0       ALLERGIES   No Known Allergies    PAST MEDICAL HISTORY:  History reviewed. No pertinent past medical history.    PAST SURGICAL HISTORY:  History reviewed. No pertinent surgical history.    FAMILY HISTORY:  Family History   Problem Relation Age of Onset     Hypertension Father      Thrombosis Father 42        DVT     Other - See Comments Father         WPW     Thrombosis Mother      Colon Cancer Maternal Grandfather      Migraines Paternal Grandmother      Dementia Paternal Grandfather      Hypertension Brother      Other - See Comments Paternal Aunt         Mitral valve prolapse       SOCIAL HISTORY:  Social History     Socioeconomic History     Marital status:      Spouse name: None     Number of children: None     Years of education: None     Highest education level: None   Occupational History     None   Social Needs     Financial resource strain: None     Food insecurity:     Worry: None     Inability: None     Transportation needs:     Medical: None     Non-medical: None   Tobacco Use     Smoking status: Never Smoker     Smokeless tobacco: Never Used   Substance and Sexual Activity     Alcohol use: Yes     Comment: very rare     Drug use: No     Sexual activity: Yes     Partners: Female   Lifestyle     Physical activity:     Days per week: None     Minutes per session: None     Stress: None   Relationships     Social connections:     Talks on phone: None     Gets together: None     Attends Yarsani service: None     Active member of club or organization: None     Attends meetings of clubs or organizations: None     Relationship status: None     Intimate  "partner violence:     Fear of current or ex partner: None     Emotionally abused: None     Physically abused: None     Forced sexual activity: None   Other Topics Concern     Parent/sibling w/ CABG, MI or angioplasty before 65F 55M? No   Social History Narrative     None       Review of Systems:  Skin:  Negative       Eyes:  Positive for glasses;contacts    ENT:  Positive for nasal congestion durrent URI  Respiratory:  Negative       Cardiovascular:    palpitations;Positive for recognising triggers lately  Gastroenterology: Negative      Genitourinary:  Negative      Musculoskeletal:  Negative      Neurologic:  Negative      Psychiatric:  Negative      Heme/Lymph/Imm:  Negative      Endocrine:  Negative        Physical Exam:  Vitals: BP (!) 142/90   Pulse 80   Ht 1.778 m (5' 10\")   Wt 79.7 kg (175 lb 9.6 oz)   BMI 25.20 kg/m       Constitutional:  cooperative        Skin:  warm and dry to the touch          Head:  normocephalic        Eyes:  pupils equal and round        Lymph:      ENT:  no pallor or cyanosis;dentition good        Neck:  JVP normal        Respiratory:  clear to auscultation         Cardiac: regular rhythm                pulses full and equal                                       Soft systolic murmur. No diastolic murmur    GI:  abdomen soft        Extremities and Muscular Skeletal:  no edema              Neurological:  affect appropriate        Psych:  Alert and Oriented x 3    Encounter Diagnoses   Name Primary?     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     Hypertension, unspecified type      Palpitations        Recent Lab Results:  LIPID RESULTS:  Lab Results   Component Value Date    CHOL 132 10/11/2018    HDL 59 10/11/2018    LDL 62 10/11/2018    TRIG 54 10/11/2018       LIVER ENZYME RESULTS:  Lab Results   Component Value Date    AST 16 12/12/2019    ALT 22 12/12/2019       CBC RESULTS:  Lab Results   Component Value Date    WBC 7.2 12/12/2019    RBC 4.82 12/12/2019    HGB 15.4 " 12/12/2019    HCT 44.9 12/12/2019    MCV 93 12/12/2019    MCH 32.0 12/12/2019    MCHC 34.3 12/12/2019    RDW 12.3 12/12/2019     12/12/2019       BMP RESULTS:  Lab Results   Component Value Date     12/12/2019    POTASSIUM 4.1 12/12/2019    CHLORIDE 106 12/12/2019    CO2 27 12/12/2019    ANIONGAP 5 12/12/2019     (H) 12/12/2019    BUN 11 12/12/2019    CR 0.83 12/12/2019    GFRESTIMATED >90 12/12/2019    GFRESTBLACK >90 12/12/2019    BHUPENDRA 9.4 12/12/2019        A1C RESULTS:  No results found for: A1C    INR RESULTS:  No results found for: INR        Thank you for allowing me to participate in the care of your patient.    Sincerely,     Rubina Rojo MD     [facility]

## 2019-12-12 NOTE — PROGRESS NOTES
HPI and Plan:   I had the pleasure of seeing Ludwin Massey today in a shared adult cardiology clinic follow up. He is a pleasant 29 year old patient of Dr. Rojo.    Mr. Massey has a past medical history significant for primary atrial septal defect and cleft mitral valve diagnosed at 13 years of age.  He had surgery at the [facility] and was followed by Dr. Wang since his surgery in 2003.      When Dr. Rojo met Mr. Massey in 11/2017, his symptoms were primarily palpitations.  He continues to have palpations, they haven't really changed in frequency or duration over the last year. He notices them most when he stands, squatting abates them. They seem to occur most when he is dehydrated, has salty food, or the morning after he has had alcohol. Previous Holter and Ziopatch have not captured his symptoms. Previous ziopatch did show short runs of  SVTs and PVCs, but nothing of significance.  Previous TSH was normal.  CBC today was unremarkable.    He does like to ride his bicycle, he has a way to do it indoors in the wintertime.  However he has not been doing it as much as he likes.  His blood pressure is a bit elevated today, 140/80 by my check.  Continues on Toprol-XL 25 mg daily.  He has not had any chest pain, shortness of breath, PND orthopnea.      His echocardiogram done before his office visit today looks unchanged from previous, there is prolapse of the mitral valve anterior leaflet. Mild (1+) mitral valve insufficiency. There is no ventricular level shunting. There is no atrial level shunting. Good left and right ventricular systolic function. No pericardial effusion.    Assessment and Plan  1. Palpitations.  These continue to be his primary concern.  They can happen 2 weeks apart, but then can occur in clusters, always subside with squatting.  We have not been able to capture them well on previous monitoring.  We discussed different options including having him come in for EKG if symptoms are  on-going, doing a 30-day event monitor, or alternatively using some kind of watch, like an Apple watch that can capture an EKG.  He is going to look into the watches, and will call our office and let us know if he wants a 30 day event monitor instead.  He can certainly take an extra Toprol if he is having symptoms on a particular day.  2. Status post repair of primary atrial septal defect and cleft mitral valve 06/2003 at [facility].    3.  Mild mitral and tricuspid insufficiency, stable.   4.  Possible celiac disease.   5.  Hypertension.  His blood pressure little bit elevated, ideally it should be less than 130 systolic.  I am going to add hydrochlorothiazide 25 mg daily.  He will get a BMP in 1 week's time.     Thank you for allowing me to care for Ludwin Massey today.    DEBBIE Andres, CNP  Cardiology    This is a shared visit between myself and DEBBIE Roy. I was present for all the visit. This patient was seen and examined by me. We discussed the patient and plan of care including medication changes. I agree with the mutually agreed upon plan outlined above.   Dr. Rubina Rojo MD    Voice recognition software was used for this note, I have reviewed this note, but errors may have been missed.    Orders Placed This Encounter   Procedures     Basic metabolic panel     Follow-Up With Adult Congenital and CV Genetics     EKG 12-lead complete w/read - Clinics (performed today)     Orders Placed This Encounter   Medications     hydrochlorothiazide (HYDRODIURIL) 25 MG tablet     Sig: Take 1 tablet (25 mg) by mouth daily     Dispense:  90 tablet     Refill:  3     Medications Discontinued During This Encounter   Medication Reason     Magnesium-Zinc (MAGNESIUM-CHELATED ZINC PO) Stopped by Patient         CURRENT MEDICATIONS:  Current Outpatient Medications   Medication Sig Dispense Refill     hydrochlorothiazide (HYDRODIURIL) 25 MG tablet Take 1 tablet (25 mg) by mouth daily 90  tablet 3     metoprolol succinate ER (TOPROL-XL) 25 MG 24 hr tablet Take 1 tablet (25 mg) by mouth daily 90 tablet 0       ALLERGIES   No Known Allergies    PAST MEDICAL HISTORY:  History reviewed. No pertinent past medical history.    PAST SURGICAL HISTORY:  History reviewed. No pertinent surgical history.    FAMILY HISTORY:  Family History   Problem Relation Age of Onset     Hypertension Father      Thrombosis Father 42        DVT     Other - See Comments Father         WPW     Thrombosis Mother      Colon Cancer Maternal Grandfather      Migraines Paternal Grandmother      Dementia Paternal Grandfather      Hypertension Brother      Other - See Comments Paternal Aunt         Mitral valve prolapse       SOCIAL HISTORY:  Social History     Socioeconomic History     Marital status:      Spouse name: None     Number of children: None     Years of education: None     Highest education level: None   Occupational History     None   Social Needs     Financial resource strain: None     Food insecurity:     Worry: None     Inability: None     Transportation needs:     Medical: None     Non-medical: None   Tobacco Use     Smoking status: Never Smoker     Smokeless tobacco: Never Used   Substance and Sexual Activity     Alcohol use: Yes     Comment: very rare     Drug use: No     Sexual activity: Yes     Partners: Female   Lifestyle     Physical activity:     Days per week: None     Minutes per session: None     Stress: None   Relationships     Social connections:     Talks on phone: None     Gets together: None     Attends Hoahaoism service: None     Active member of club or organization: None     Attends meetings of clubs or organizations: None     Relationship status: None     Intimate partner violence:     Fear of current or ex partner: None     Emotionally abused: None     Physically abused: None     Forced sexual activity: None   Other Topics Concern     Parent/sibling w/ CABG, MI or angioplasty before 65F 55M?  "No   Social History Narrative     None       Review of Systems:  Skin:  Negative       Eyes:  Positive for glasses;contacts    ENT:  Positive for nasal congestion durrent URI  Respiratory:  Negative       Cardiovascular:    palpitations;Positive for recognising triggers lately  Gastroenterology: Negative      Genitourinary:  Negative      Musculoskeletal:  Negative      Neurologic:  Negative      Psychiatric:  Negative      Heme/Lymph/Imm:  Negative      Endocrine:  Negative        Physical Exam:  Vitals: BP (!) 142/90   Pulse 80   Ht 1.778 m (5' 10\")   Wt 79.7 kg (175 lb 9.6 oz)   BMI 25.20 kg/m      Constitutional:  cooperative        Skin:  warm and dry to the touch          Head:  normocephalic        Eyes:  pupils equal and round        Lymph:      ENT:  no pallor or cyanosis;dentition good        Neck:  JVP normal        Respiratory:  clear to auscultation         Cardiac: regular rhythm                pulses full and equal                                       Soft systolic murmur. No diastolic murmur    GI:  abdomen soft        Extremities and Muscular Skeletal:  no edema              Neurological:  affect appropriate        Psych:  Alert and Oriented x 3    Encounter Diagnoses   Name Primary?     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     Hypertension, unspecified type      Palpitations        Recent Lab Results:  LIPID RESULTS:  Lab Results   Component Value Date    CHOL 132 10/11/2018    HDL 59 10/11/2018    LDL 62 10/11/2018    TRIG 54 10/11/2018       LIVER ENZYME RESULTS:  Lab Results   Component Value Date    AST 16 12/12/2019    ALT 22 12/12/2019       CBC RESULTS:  Lab Results   Component Value Date    WBC 7.2 12/12/2019    RBC 4.82 12/12/2019    HGB 15.4 12/12/2019    HCT 44.9 12/12/2019    MCV 93 12/12/2019    MCH 32.0 12/12/2019    MCHC 34.3 12/12/2019    RDW 12.3 12/12/2019     12/12/2019       BMP RESULTS:  Lab Results   Component Value Date     12/12/2019    POTASSIUM " 4.1 12/12/2019    CHLORIDE 106 12/12/2019    CO2 27 12/12/2019    ANIONGAP 5 12/12/2019     (H) 12/12/2019    BUN 11 12/12/2019    CR 0.83 12/12/2019    GFRESTIMATED >90 12/12/2019    GFRESTBLACK >90 12/12/2019    BHUPENDRA 9.4 12/12/2019        A1C RESULTS:  No results found for: A1C    INR RESULTS:  No results found for: INR        CC  Rubina Rojo MD  [id number]  [place] [id number]

## 2019-12-12 NOTE — PATIENT INSTRUCTIONS
You were seen today in the Adult Congenital and Cardiovascular Genetics Clinic at the [facility].    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    Recommendations:    1. Continue to eat a heart healthy, low salt diet.  2. Continue to get 20-30 minutes of aerobic activity, 4-5 days per week.  Examples of aerobic activity include walking, running, swimming, cycling, etc.  3. Continue to observe good oral hygiene, with regular dental visits.  4. Begin taking hydrochlorothiazide 25 mg daily.   5. Please have a BMP lab drawn in a week. If done outside of the [facility] system, please have the results faxed to us at [phone].    SBE prophylaxis:   Yes____  No_X___    Lifelong Bacterial Endocarditis Prophylaxis:  YES____  NO____    If YES is checked, follow the recommendations outlined below:  1. Take antibiotic(s) prior to recommended dental procedures and procedures on the respiratory tract or with infected skin, muscle or bones. SBE prophylaxis is not needed for routine GI and  procedures (ie. Colonoscopy or vaginal delivery)  2. Observe good oral hygiene daily, as advised by your dentist. Get regular professional dental care.  3. Keep cuts clean.  4. Infections should be treated promptly.  5. Symptoms of Infective Endocarditis could include: fever lasting more than 4-5 days or a recurrent fever that initially resolves but returns within 1-2 days)      Exercise restrictions:   Yes__X__  No____         If yes, list restrictions:  Must be allowed to rest if fatigued or SOB      Work restrictions:  Yes____  No_X___         If yes, list restrictions:    FASTING CHOLESTEROL was checked in the last 5 years YES_X__  NO___ (2018)  Continue to eat a heart healthy, low salt diet.         ____ Fasting lipid panel  order today         ____ No changes in medications          ____ I recommend the following changes in your cholesterol medications.:          ____ Please follow up for cholesterol screening at your primary care physician      Follow-up:  Follow up with Dr. Rojo in one year with an echo, EKG, and labs.    If you have questions or concerns please contact us at:    Rabia Potter, MSN, RN, CNL    Claire Umaña (Scheduling)  Nurse Care Coordinator     Clinic   Adult Congenital and CV Genetics   Adult Congenital and CV Genetic  [facility]   [facility]  (P) [phone]     (P) [phone]  [email or web address]   (F) [phone]        For after hours urgent needs, call [phone] and ask to speak to the Adult Congenital Physician on call.  Mention Job Code 0401.    For emergencies call 911.    [facility]   [facility]  Mail Code [id number]   [place]  [address]

## 2019-12-12 NOTE — NURSING NOTE
Cardiac Testing: Patient given instructions regarding  echocardiogram . Discussed purpose, preparation, procedure and when to expect results reported back to the patient. Patient demonstrated understanding of this information and agreed to call with further questions or concerns.  Diet: Patient instructed regarding a heart healthy diet, including discussion of reduced fat and sodium intake. Patient demonstrated understanding of this information and agreed to call with further questions or concerns.  Labs: Patient was given results of the laboratory testing obtained today. Patient was instructed to return for the next laboratory testing in 1 week. Patient demonstrated understanding of this information and agreed to call with further questions or concerns.   Med Reconcile: Reviewed and verified all current medications with the patient. The updated medication list was printed and given to the patient.  New Medication: Patient was educated regarding newly prescribed medication, including discussion of  the indication, administration, side effects, and when to report to MD or RN. Patient demonstrated understanding of this information and agreed to call with further questions or concerns.  Return Appointment: Patient given instructions regarding scheduling next clinic visit. Patient demonstrated understanding of this information and agreed to call with further questions or concerns.  Patient stated he understood all health information given and agreed to call with further questions or concerns.

## 2019-12-23 DIAGNOSIS — I10 HYPERTENSION, UNSPECIFIED TYPE: ICD-10-CM

## 2019-12-23 PROCEDURE — 80048 BASIC METABOLIC PNL TOTAL CA: CPT | Performed by: NURSE PRACTITIONER

## 2019-12-23 PROCEDURE — 36415 COLL VENOUS BLD VENIPUNCTURE: CPT | Performed by: NURSE PRACTITIONER

## 2019-12-24 LAB
ANION GAP SERPL CALCULATED.3IONS-SCNC: 3 MMOL/L (ref 3–14)
BUN SERPL-MCNC: 13 MG/DL (ref 7–30)
CALCIUM SERPL-MCNC: 9.1 MG/DL (ref 8.5–10.1)
CHLORIDE SERPL-SCNC: 103 MMOL/L (ref 94–109)
CO2 SERPL-SCNC: 30 MMOL/L (ref 20–32)
CREAT SERPL-MCNC: 0.94 MG/DL (ref 0.66–1.25)
GFR SERPL CREATININE-BSD FRML MDRD: >90 ML/MIN/{1.73_M2}
GLUCOSE SERPL-MCNC: 90 MG/DL (ref 70–99)
POTASSIUM SERPL-SCNC: 3.8 MMOL/L (ref 3.4–5.3)
SODIUM SERPL-SCNC: 136 MMOL/L (ref 133–144)

## 2020-02-27 ENCOUNTER — MYC REFILL (OUTPATIENT)
Dept: CARDIOLOGY | Facility: CLINIC | Age: 30
End: 2020-02-27

## 2020-02-27 DIAGNOSIS — Q23.82 CONGENITAL CLEFT LEAFLET OF MITRAL VALVE: ICD-10-CM

## 2020-02-27 DIAGNOSIS — Q21.20 ASD (ATRIAL SEPTAL DEFECT), OSTIUM PRIMUM: ICD-10-CM

## 2020-02-27 DIAGNOSIS — Z87.74 S/P CLOSURE OF CONGENITAL ATRIAL SEPTAL DEFECT BY PERCUTANEOUS TRANSCATHETER TECHNIQUE: ICD-10-CM

## 2020-02-27 DIAGNOSIS — I10 HYPERTENSION, UNSPECIFIED TYPE: ICD-10-CM

## 2020-03-02 DIAGNOSIS — I10 HYPERTENSION, UNSPECIFIED TYPE: ICD-10-CM

## 2020-03-02 DIAGNOSIS — Z87.74 S/P CLOSURE OF CONGENITAL ATRIAL SEPTAL DEFECT BY PERCUTANEOUS TRANSCATHETER TECHNIQUE: ICD-10-CM

## 2020-03-02 DIAGNOSIS — Q23.82 CONGENITAL CLEFT LEAFLET OF MITRAL VALVE: ICD-10-CM

## 2020-03-02 DIAGNOSIS — Q21.20 ASD (ATRIAL SEPTAL DEFECT), OSTIUM PRIMUM: ICD-10-CM

## 2020-03-02 RX ORDER — METOPROLOL SUCCINATE 25 MG/1
25 TABLET, EXTENDED RELEASE ORAL DAILY
Qty: 90 TABLET | Refills: 2 | Status: SHIPPED | OUTPATIENT
Start: 2020-03-02 | End: 2020-12-04

## 2020-03-02 NOTE — TELEPHONE ENCOUNTER
Refill request from [facility] for Metoprolol Succinate 25mg. LOV 12/12/19. 90 day supply with 2 refills sent. ALPESH Lees RN.

## 2020-03-03 RX ORDER — METOPROLOL SUCCINATE 25 MG/1
25 TABLET, EXTENDED RELEASE ORAL DAILY
Qty: 90 TABLET | Refills: 0 | OUTPATIENT
Start: 2020-03-03

## 2020-03-03 NOTE — TELEPHONE ENCOUNTER
metoprolol succinate ER (TOPROL-XL) 25 MG 24 hr tablet   Medication sent to pharm 3/2/2020      metoprolol succinate ER (TOPROL-XL) 25 MG 24 hr tablet 90 tablet 2 3/2/2020  No   Sig - Route: Take 1 tablet (25 mg) by mouth daily - Oral   Sent to pharmacy as: metoprolol succinate ER (TOPROL-XL) 25 MG 24 hr tablet   Class: E-Prescribe   Order: [id number]   E-Prescribing Status: Receipt confirmed by pharmacy (3/2/2020 10:42 AM CST)     Anna Moran RN  Central Triage Red Flags/Med Refills

## 2020-03-18 ENCOUNTER — TELEPHONE (OUTPATIENT)
Dept: CARDIOLOGY | Facility: CLINIC | Age: 30
End: 2020-03-18

## 2020-03-18 NOTE — TELEPHONE ENCOUNTER
Ludwin calling to see what his risk factor is with COVID-19. He is currently working remotely but his employer might request within the next 2-3 weeks for him to come work in person. He is wondering if there is any recommendations, or if he is at higher risk with his heart condition.    [phone]

## 2020-03-19 ENCOUNTER — CARE COORDINATION (OUTPATIENT)
Dept: CARDIOLOGY | Facility: CLINIC | Age: 30
End: 2020-03-19

## 2020-03-19 NOTE — TELEPHONE ENCOUNTER
"Called and discussed COVID-19 concerns and questions with Ludwin:    According to the CDC those with underlying cardiovascular and respiratory conditions are considered at higher risk for complications due to the coronavirus.  It appears to be those at risk have a history of diabetes or heart failure but we still consider our cardiac patients in the higher risk category. At this time we recommend continuing to prioritize thorough hand hygiene, avoid touching your face, avoid touching common public surfaces if possible, staying home when you are ill, and trying to stay away from others that are ill.  We also are encouraging \"social distancing\" and avoiding crowds and public places. The CDC has a lot of really good information on their web site regarding how to stay well, symptoms to watch out for, and how to prepare your home.  I would encourage you to check out their web site for more information as it is being updated frequently.     Provided a few links to patient via [employer] in support of specific questions about how long the virus lives on surfaces and the link to the Adult Congenital Heart Disease Association webinar.    Patient verbalized understanding and will reach out with questions or concerns.  "

## 2020-05-14 ENCOUNTER — MYC MEDICAL ADVICE (OUTPATIENT)
Dept: DERMATOLOGY | Facility: CLINIC | Age: 30
End: 2020-05-14

## 2020-05-20 ENCOUNTER — TELEPHONE (OUTPATIENT)
Dept: FAMILY MEDICINE | Facility: CLINIC | Age: 30
End: 2020-05-20

## 2020-05-20 ENCOUNTER — VIRTUAL VISIT (OUTPATIENT)
Dept: DERMATOLOGY | Facility: CLINIC | Age: 30
End: 2020-05-20
Payer: COMMERCIAL

## 2020-05-20 VITALS — BODY MASS INDEX: 24.39 KG/M2 | WEIGHT: 170 LBS

## 2020-05-20 DIAGNOSIS — D23.9 DERMATOFIBROMA: Primary | ICD-10-CM

## 2020-05-20 DIAGNOSIS — D48.5 NEOPLASM OF UNCERTAIN BEHAVIOR OF SKIN: ICD-10-CM

## 2020-05-20 PROCEDURE — 99207 ZZC NO BILLABLE SERVICE THIS VISIT: CPT | Performed by: PHYSICIAN ASSISTANT

## 2020-05-20 NOTE — PROGRESS NOTES
"    Ludwin Massey is a 30 year old male who is being evaluated via a billable video visit.      The patient has been notified of following:     \"This video visit will be conducted via a call between you and your physician/provider. We have found that certain health care needs can be provided without the need for an in-person physical exam.  This service lets us provide the care you need with a video conversation.  If a prescription is necessary we can send it directly to your pharmacy.  If lab work is needed we can place an order for that and you can then stop by our lab to have the test done at a later time.    Video visits are billed at different rates depending on your insurance coverage.  Please reach out to your insurance provider with any questions.    If during the course of the call the physician/provider feels a video visit is not appropriate, you will not be charged for this service.\"    Patient has given verbal consent for Video visit? Yes    How would you like to obtain your AVS? [employer]      Video Start Time: 3.27pm  doximity used.         HPI:  Ludwin Massey is a 30 year old male patient here today for new spot on shoulder and neck .  Patient states this has been present for months?.  Patient reports the following symptoms: new, growing .  Patient reports the following previous treatments: none.  Patient reports the following modifying factors: none.  Associated symptoms: none.  Patient has no other skin complaints today.  Remainder of the HPI, Meds, PMH, Allergies, FH, and SH was reviewed in chart.    Pertinent Hx:   No personal or family history of skin cancer    History reviewed. No pertinent past medical history.    History reviewed. No pertinent surgical history.     Family History   Problem Relation Age of Onset     Hypertension Father      Thrombosis Father 42        DVT     Other - See Comments Father         WPW     Thrombosis Mother      Colon Cancer Maternal Grandfather      Migraines " Paternal Grandmother      Dementia Paternal Grandfather      Hypertension Brother      Other - See Comments Paternal Aunt         Mitral valve prolapse       Social History     Socioeconomic History     Marital status:      Spouse name: Not on file     Number of children: Not on file     Years of education: Not on file     Highest education level: Not on file   Occupational History     Not on file   Social Needs     Financial resource strain: Not on file     Food insecurity     Worry: Not on file     Inability: Not on file     Transportation needs     Medical: Not on file     Non-medical: Not on file   Tobacco Use     Smoking status: Never Smoker     Smokeless tobacco: Never Used   Substance and Sexual Activity     Alcohol use: Yes     Comment: very rare     Drug use: No     Sexual activity: Yes     Partners: Female   Lifestyle     Physical activity     Days per week: Not on file     Minutes per session: Not on file     Stress: Not on file   Relationships     Social connections     Talks on phone: Not on file     Gets together: Not on file     Attends Hinduism service: Not on file     Active member of club or organization: Not on file     Attends meetings of clubs or organizations: Not on file     Relationship status: Not on file     Intimate partner violence     Fear of current or ex partner: Not on file     Emotionally abused: Not on file     Physically abused: Not on file     Forced sexual activity: Not on file   Other Topics Concern     Parent/sibling w/ CABG, MI or angioplasty before 65F 55M? No   Social History Narrative     Not on file       Outpatient Encounter Medications as of 5/20/2020   Medication Sig Dispense Refill     hydrochlorothiazide (HYDRODIURIL) 25 MG tablet Take 1 tablet (25 mg) by mouth daily 90 tablet 3     metoprolol succinate ER (TOPROL-XL) 25 MG 24 hr tablet Take 1 tablet (25 mg) by mouth daily 90 tablet 2     No facility-administered encounter medications on file as of 5/20/2020.         Review Of Systems:  Skin: spot neck and shoulder  Eyes: negative  Ears/Nose/Throat: negative  Respiratory: No shortness of breath, dyspnea on exertion, cough, or hemoptysis  Cardiovascular: negative  Gastrointestinal: negative  Genitourinary: negative  Musculoskeletal: negative  Neurologic: negative  Psychiatric: negative  Hematologic/Lymphatic/Immunologic: negative  Endocrine: negative      Objective:     Wt 77.1 kg (170 lb)   BMI 24.39 kg/m    Eyes: Conjunctivae/lids: Normal   ENT: Lips:  Normal  MSK: Normal  Pulm: Breathing Normal  Neuro/Psych: Orientation: A/O x 3 Normal; Mood/Affect: Normal, NAD, WDWN  Pt accompanied by: self  Following areas examined: left shoulder, right neck  Arce skin type:ii   Findings:  Pink pearly papule with hyperpigmentation and teleangiectasia on right neck  Smooth pink macule on left shoulder  Assessment and Plan:  1) dermatofibroma   I discussed the specifics of tumor, prognosis, and genetics of benign lesions.  I explained that treatment of these lesions would be purely cosmetic and not medically neccessary.  I discussed with patient different removal options. Some of these options include extraction, excision, cryotherapy, cautery and /or laser.  Lesion may recur and/or may not completely resolve. May need additional treatment.       2) favor bcc  Follow up tomorrow for biopsy    Follow up in 1020 Thursday for biopsy.   Teledermatology information:  - Location of patient: home  - Location of teledermatologist: [facility]  - Reason teledermatology is appropriate: of National Emergency Regarding Coronavirus disease (COVID 19) Outbreak  - The patient's condition can safely be assessed using telemedicine: yes  - Method of transmission: store and forward teledermatology  - Image quality and interpretability: acceptable  - Physician has received verbal consent for a Video/Photos Visit from the patient? Yes  - In-person dermatology visit recommendation: yes - for biopsy  -  Date of images: 5/18/20  - Service start time:3.27am/pm  - Service end time:3.40am/pm  - Date of report: 05/20/20  Consent has been obtained for this service by 1 care team member: yes.

## 2020-05-21 ENCOUNTER — OFFICE VISIT (OUTPATIENT)
Dept: FAMILY MEDICINE | Facility: CLINIC | Age: 30
End: 2020-05-21
Payer: COMMERCIAL

## 2020-05-21 VITALS — SYSTOLIC BLOOD PRESSURE: 132 MMHG | DIASTOLIC BLOOD PRESSURE: 78 MMHG | TEMPERATURE: 97 F

## 2020-05-21 DIAGNOSIS — D23.9 DERMATOFIBROMA: ICD-10-CM

## 2020-05-21 DIAGNOSIS — D48.5 NEOPLASM OF UNCERTAIN BEHAVIOR OF SKIN: Primary | ICD-10-CM

## 2020-05-21 PROCEDURE — 99214 OFFICE O/P EST MOD 30 MIN: CPT | Mod: 25 | Performed by: PHYSICIAN ASSISTANT

## 2020-05-21 PROCEDURE — 11102 TANGNTL BX SKIN SINGLE LES: CPT | Performed by: PHYSICIAN ASSISTANT

## 2020-05-21 PROCEDURE — 88305 TISSUE EXAM BY PATHOLOGIST: CPT | Mod: TC | Performed by: PHYSICIAN ASSISTANT

## 2020-05-21 NOTE — PATIENT INSTRUCTIONS
Wound Care Instructions     FOR SUPERFICIAL WOUNDS     [facility] [phone]    [facility] [phone]          AFTER 24 HOURS YOU SHOULD REMOVE THE BANDAGE AND BEGIN DAILY DRESSING CHANGES AS FOLLOWS:     1) Remove Dressing.     2) Clean and dry the area with tap water using a Q-tip or sterile gauze pad.     3) Apply Vaseline, Aquaphor, Polysporin ointment or Bacitracin ointment over entire wound.  Do NOT use Neosporin ointment.     4) Cover the wound with a band-aid, or a sterile non-stick gauze pad and micropore paper tape      REPEAT THESE INSTRUCTIONS AT LEAST ONCE A DAY UNTIL THE WOUND HAS COMPLETELY HEALED.    It is an old wives tale that a wound heals better when it is exposed to air and allowed to dry out. The wound will heal faster with a better cosmetic result if it is kept moist with ointment and covered with a bandage.    **Do not let the wound dry out.**      Supplies Needed:      *Cotton tipped applicators (Q-tips)    *Polysporin Ointment or Bacitracin Ointment (NOT NEOSPORIN)    *Band-aids or non-stick gauze pads and micropore paper tape.      PATIENT INFORMATION:    During the healing process you will notice a number of changes. All wounds develop a small halo of redness surrounding the wound.  This means healing is occurring. Severe itching with extensive redness usually indicates sensitivity to the ointment or bandage tape used to dress the wound.  You should call our office if this develops.      Swelling  and/or discoloration around your surgical site is common, particularly when performed around the eye.    All wounds normally drain.  The larger the wound the more drainage there will be.  After 7-10 days, you will notice the wound beginning to shrink and new skin will begin to grow.  The wound is healed when you can see skin has formed over the entire area.  A healed wound has a healthy, shiny look to the surface and is red to dark pink in color to  normalize.  Wounds may take approximately 4-6 weeks to heal.  Larger wounds may take 6-8 weeks.  After the wound is healed you may discontinue dressing changes.    You may experience a sensation of tightness as your wound heals. This is normal and will gradually subside.    Your healed wound may be sensitive to temperature changes. This sensitivity improves with time, but if you re having a lot of discomfort, try to avoid temperature extremes.    Patients frequently experience itching after their wound appears to have healed because of the continue healing under the skin.  Plain Vaseline will help relieve the itching.        POSSIBLE COMPLICATIONS    BLEEDIN. Leave the bandage in place.  2. Use tightly rolled up gauze or a cloth to apply direct pressure over the bandage for 30  minutes.  3. Reapply pressure for an additional 30 minutes if necessary  4. Use additional gauze and tape to maintain pressure once the bleeding has stopped.      Proper skin care from [facility]:    -Eliminate harsh soaps as they strip the natural oils from the skin, often resulting in dry itchy skin ( i.e. Dial, Zest, [place] Spring)  -Use mild soaps such as Cetaphil or Dove Sensitive Skin in the shower. You do not need to use soap on arms, legs, and trunk every time you shower unless visibly soiled.   -Avoid hot or cold showers.  -After showering, lightly dry off and apply moisturizing within 2-3 minutes. This will help trap moisture in the skin.   -Aggressive use of a moisturizer at least 1-2 times a day to the entire body (including -Vanicream, Cetaphil, Aquaphor or Cerave) and moisturize hands after every washing.  -We recommend using moisturizers that come in a tub that needs to be scooped out, not a pump. This has more of an oil base. It will hold moisture in your skin much better than a water base moisturizer. The above recommended are non-pore clogging.      Wear a sunscreen with at least SPF 30 on your face, ears,  neck and V of the chest daily. Wear sunscreen on other areas of the body if those areas are exposed to the sun throughout the day. Sunscreens can contain physical and/or chemical blockers. Physical blockers are less likely to clog pores, these include zinc oxide and titanium dioxide. Reapply every two hour and after swimming. Sunscreen examples include Neutrogena, CeraVe, Blue Lizard, Elta MD and many others.    UV radiation  UVA radiation remains constant throughout the day and throughout the year. It is a longer wavelength than UVB and therefore penetrates deeper into the skin leading to immediate and delayed tanning, photoaging, and skin cancer. 70-80% of UVA and UVB radiation occurs between the hours of 10am-2pm.  UVB radiation  UVB radiation causes the most harmful effects and is more significant during the summer months. However, snow and ice can reflect UVB radiation leading to skin damage during the winter months as well. UVB radiation is responsible for tanning, burning, inflammation, delayed erythema (pinkness), pigmentation (brown spots), and skin cancer.     Dermatofibroma  [email or web address]

## 2020-05-21 NOTE — PROGRESS NOTES
HPI:  Ludwin Massey is a 30 year old male patient here today for new spot on neck .  Patient states this has been present for 6 months.  Patient reports the following symptoms: growing, changing size, new .  Patient reports the following previous treatments: none.  Patient reports the following modifying factors: none.  Associated symptoms: none.  Also has a spot on left shoulder for a while. Not bothersome. No tx tried. Patient has no other skin complaints today.  Remainder of the HPI, Meds, PMH, Allergies, FH, and SH was reviewed in chart.    Pertinent Hx:   No personal or family history of skin cancer    History reviewed. No pertinent past medical history.    History reviewed. No pertinent surgical history.     Family History   Problem Relation Age of Onset     Hypertension Father      Thrombosis Father 42        DVT     Other - See Comments Father         WPW     Thrombosis Mother      Colon Cancer Maternal Grandfather      Migraines Paternal Grandmother      Dementia Paternal Grandfather      Hypertension Brother      Other - See Comments Paternal Aunt         Mitral valve prolapse       Social History     Socioeconomic History     Marital status:      Spouse name: Not on file     Number of children: Not on file     Years of education: Not on file     Highest education level: Not on file   Occupational History     Not on file   Social Needs     Financial resource strain: Not on file     Food insecurity     Worry: Not on file     Inability: Not on file     Transportation needs     Medical: Not on file     Non-medical: Not on file   Tobacco Use     Smoking status: Never Smoker     Smokeless tobacco: Never Used   Substance and Sexual Activity     Alcohol use: Yes     Comment: very rare     Drug use: No     Sexual activity: Yes     Partners: Female   Lifestyle     Physical activity     Days per week: Not on file     Minutes per session: Not on file     Stress: Not on file   Relationships     Social  connections     Talks on phone: Not on file     Gets together: Not on file     Attends Gnosticism service: Not on file     Active member of club or organization: Not on file     Attends meetings of clubs or organizations: Not on file     Relationship status: Not on file     Intimate partner violence     Fear of current or ex partner: Not on file     Emotionally abused: Not on file     Physically abused: Not on file     Forced sexual activity: Not on file   Other Topics Concern     Parent/sibling w/ CABG, MI or angioplasty before 65F 55M? No   Social History Narrative     Not on file       Outpatient Encounter Medications as of 5/21/2020   Medication Sig Dispense Refill     hydrochlorothiazide (HYDRODIURIL) 25 MG tablet Take 1 tablet (25 mg) by mouth daily 90 tablet 3     metoprolol succinate ER (TOPROL-XL) 25 MG 24 hr tablet Take 1 tablet (25 mg) by mouth daily 90 tablet 2     No facility-administered encounter medications on file as of 5/21/2020.        Review Of Systems:  Skin: spot on neck  Eyes: negative  Ears/Nose/Throat: negative  Respiratory: No shortness of breath, dyspnea on exertion, cough, or hemoptysis  Cardiovascular: negative  Gastrointestinal: negative  Genitourinary: negative  Musculoskeletal: negative  Neurologic: negative  Psychiatric: negative  Hematologic/Lymphatic/Immunologic: negative  Endocrine: negative      Objective:     /78   Temp 97  F (36.1  C) (Tympanic)   Eyes: Conjunctivae/lids: Normal   ENT: Lips:  Normal  MSK: Normal  Cardiovascular: Peripheral edema none  Pulm: Breathing Normal  Neuro/Psych: Orientation: A/O x 3 Normal; Mood/Affect: Normal, NAD, WDWN  Pt accompanied by: self  Following areas examined: eyes, forehead (pt wearing mask) left superior shoulder, right lateral neck  Arce skin type:ii   Findings:  Pink pearly papule with brown pigment on right lateral base of neck 0.2cm  Smooth brown/red macule with positive dimple sign on left superior shoulder  Assessment  and Plan:  1) Neoplasm of uncertain behavior right lateral base of neck 0.2cm  Benign nevus vs BCC  TANGENTIAL BIOPSY:  After consent, anesthesia with LEC and prep, tangential biopsy performed.  No complications and routine wound care.  May grow back and will get a scar. Based on lesion type may need to completely remove lesion. Patient will be notified in 7-10 days of results. Wound care directions given.    2) Dermatofibroma  Disc etiology and course  [email or web address]  Treatment of these lesions would be purely cosmetic and not medically neccessary.  Lesion may recur and/or may not completely resolve. May need additional treatment.  Different removal options including excision, cryotherapy, cautery and /or laser.              Follow up in yearly

## 2020-05-21 NOTE — LETTER
5/21/2020         RE: Ludwin Massey  [id number] [facility] [id number]        Dear Colleague,    Thank you for referring your patient, Ludwin Massey, to the [facility]. Please see a copy of my visit note below.    HPI:  Ludwin Massey is a 30 year old male patient here today for new spot on neck .  Patient states this has been present for 6 months.  Patient reports the following symptoms: growing, changing size, new .  Patient reports the following previous treatments: none.  Patient reports the following modifying factors: none.  Associated symptoms: none.  Also has a spot on left shoulder for a while. Not bothersome. No tx tried. Patient has no other skin complaints today.  Remainder of the HPI, Meds, PMH, Allergies, FH, and SH was reviewed in chart.    Pertinent Hx:   No personal or family history of skin cancer    History reviewed. No pertinent past medical history.    History reviewed. No pertinent surgical history.     Family History   Problem Relation Age of Onset     Hypertension Father      Thrombosis Father 42        DVT     Other - See Comments Father         WPW     Thrombosis Mother      Colon Cancer Maternal Grandfather      Migraines Paternal Grandmother      Dementia Paternal Grandfather      Hypertension Brother      Other - See Comments Paternal Aunt         Mitral valve prolapse       Social History     Socioeconomic History     Marital status:      Spouse name: Not on file     Number of children: Not on file     Years of education: Not on file     Highest education level: Not on file   Occupational History     Not on file   Social Needs     Financial resource strain: Not on file     Food insecurity     Worry: Not on file     Inability: Not on file     Transportation needs     Medical: Not on file     Non-medical: Not on file   Tobacco Use     Smoking status: Never Smoker     Smokeless tobacco: Never Used   Substance and Sexual Activity     Alcohol use: Yes      Comment: very rare     Drug use: No     Sexual activity: Yes     Partners: Female   Lifestyle     Physical activity     Days per week: Not on file     Minutes per session: Not on file     Stress: Not on file   Relationships     Social connections     Talks on phone: Not on file     Gets together: Not on file     Attends Mandaen service: Not on file     Active member of club or organization: Not on file     Attends meetings of clubs or organizations: Not on file     Relationship status: Not on file     Intimate partner violence     Fear of current or ex partner: Not on file     Emotionally abused: Not on file     Physically abused: Not on file     Forced sexual activity: Not on file   Other Topics Concern     Parent/sibling w/ CABG, MI or angioplasty before 65F 55M? No   Social History Narrative     Not on file       Outpatient Encounter Medications as of 5/21/2020   Medication Sig Dispense Refill     hydrochlorothiazide (HYDRODIURIL) 25 MG tablet Take 1 tablet (25 mg) by mouth daily 90 tablet 3     metoprolol succinate ER (TOPROL-XL) 25 MG 24 hr tablet Take 1 tablet (25 mg) by mouth daily 90 tablet 2     No facility-administered encounter medications on file as of 5/21/2020.        Review Of Systems:  Skin: spot on neck  Eyes: negative  Ears/Nose/Throat: negative  Respiratory: No shortness of breath, dyspnea on exertion, cough, or hemoptysis  Cardiovascular: negative  Gastrointestinal: negative  Genitourinary: negative  Musculoskeletal: negative  Neurologic: negative  Psychiatric: negative  Hematologic/Lymphatic/Immunologic: negative  Endocrine: negative      Objective:     /78   Temp 97  F (36.1  C) (Tympanic)   Eyes: Conjunctivae/lids: Normal   ENT: Lips:  Normal  MSK: Normal  Cardiovascular: Peripheral edema none  Pulm: Breathing Normal  Neuro/Psych: Orientation: A/O x 3 Normal; Mood/Affect: Normal, NAD, WDWN  Pt accompanied by: self  Following areas examined: eyes, forehead (pt wearing mask) left  superior shoulder, right lateral neck  Arce skin type:ii   Findings:  Pink pearly papule with brown pigment on right lateral base of neck 0.2cm  Smooth brown/red macule with positive dimple sign on left superior shoulder  Assessment and Plan:  1) Neoplasm of uncertain behavior right lateral base of neck 0.2cm  Benign nevus vs BCC  TANGENTIAL BIOPSY:  After consent, anesthesia with LEC and prep, tangential biopsy performed.  No complications and routine wound care.  May grow back and will get a scar. Based on lesion type may need to completely remove lesion. Patient will be notified in 7-10 days of results. Wound care directions given.    2) Dermatofibroma  Disc etiology and course  [email or web address]  Treatment of these lesions would be purely cosmetic and not medically neccessary.  Lesion may recur and/or may not completely resolve. May need additional treatment.  Different removal options including excision, cryotherapy, cautery and /or laser.              Follow up in yearly       Again, thank you for allowing me to participate in the care of your patient.        Sincerely,        Sheryl Falcon PA-C

## 2020-05-25 LAB — COPATH REPORT: NORMAL

## 2020-11-10 ENCOUNTER — ALLIED HEALTH/NURSE VISIT (OUTPATIENT)
Dept: NURSING | Facility: CLINIC | Age: 30
End: 2020-11-10
Payer: COMMERCIAL

## 2020-11-10 DIAGNOSIS — Z23 NEED FOR PROPHYLACTIC VACCINATION AND INOCULATION AGAINST INFLUENZA: Primary | ICD-10-CM

## 2020-11-10 PROCEDURE — 90473 IMMUNE ADMIN ORAL/NASAL: CPT

## 2020-11-10 PROCEDURE — 90672 LAIV4 VACCINE INTRANASAL: CPT

## 2020-11-22 ENCOUNTER — HEALTH MAINTENANCE LETTER (OUTPATIENT)
Age: 30
End: 2020-11-22

## 2020-12-04 ENCOUNTER — MYC REFILL (OUTPATIENT)
Dept: CARDIOLOGY | Facility: CLINIC | Age: 30
End: 2020-12-04

## 2020-12-04 DIAGNOSIS — I10 HYPERTENSION, UNSPECIFIED TYPE: ICD-10-CM

## 2020-12-04 RX ORDER — HYDROCHLOROTHIAZIDE 25 MG/1
25 TABLET ORAL DAILY
Qty: 90 TABLET | Refills: 3 | Status: CANCELLED | OUTPATIENT
Start: 2020-12-04

## 2020-12-04 RX ORDER — HYDROCHLOROTHIAZIDE 25 MG/1
25 TABLET ORAL DAILY
Qty: 90 TABLET | Refills: 0 | Status: SHIPPED | OUTPATIENT
Start: 2020-12-04 | End: 2021-03-02

## 2020-12-08 ENCOUNTER — CARE COORDINATION (OUTPATIENT)
Dept: CARDIOLOGY | Facility: CLINIC | Age: 30
End: 2020-12-08

## 2020-12-08 DIAGNOSIS — Z87.74 S/P CLOSURE OF CONGENITAL ATRIAL SEPTAL DEFECT BY PERCUTANEOUS TRANSCATHETER TECHNIQUE: Primary | ICD-10-CM

## 2021-03-02 ENCOUNTER — MYC REFILL (OUTPATIENT)
Dept: CARDIOLOGY | Facility: CLINIC | Age: 31
End: 2021-03-02

## 2021-03-02 DIAGNOSIS — Q21.20 ASD (ATRIAL SEPTAL DEFECT), OSTIUM PRIMUM: ICD-10-CM

## 2021-03-02 DIAGNOSIS — I10 HYPERTENSION, UNSPECIFIED TYPE: ICD-10-CM

## 2021-03-02 DIAGNOSIS — Z87.74 S/P CLOSURE OF CONGENITAL ATRIAL SEPTAL DEFECT BY PERCUTANEOUS TRANSCATHETER TECHNIQUE: ICD-10-CM

## 2021-03-02 DIAGNOSIS — Q23.82 CONGENITAL CLEFT LEAFLET OF MITRAL VALVE: ICD-10-CM

## 2021-03-03 RX ORDER — HYDROCHLOROTHIAZIDE 25 MG/1
25 TABLET ORAL DAILY
Qty: 90 TABLET | Refills: 0 | Status: SHIPPED | OUTPATIENT
Start: 2021-03-03 | End: 2021-06-10

## 2021-03-03 RX ORDER — METOPROLOL SUCCINATE 25 MG/1
25 TABLET, EXTENDED RELEASE ORAL DAILY
Qty: 90 TABLET | Refills: 0 | Status: SHIPPED | OUTPATIENT
Start: 2021-03-03 | End: 2021-06-10

## 2021-03-03 NOTE — TELEPHONE ENCOUNTER
Patient to be schedule in June. [facility] template not yet open. Refills until June sent.

## 2021-06-08 DIAGNOSIS — Q21.20 ASD (ATRIAL SEPTAL DEFECT), OSTIUM PRIMUM: ICD-10-CM

## 2021-06-08 DIAGNOSIS — Z87.74 S/P CLOSURE OF CONGENITAL ATRIAL SEPTAL DEFECT BY PERCUTANEOUS TRANSCATHETER TECHNIQUE: ICD-10-CM

## 2021-06-08 DIAGNOSIS — Q23.82 CONGENITAL CLEFT LEAFLET OF MITRAL VALVE: ICD-10-CM

## 2021-06-08 DIAGNOSIS — I10 HYPERTENSION, UNSPECIFIED TYPE: ICD-10-CM

## 2021-06-10 RX ORDER — HYDROCHLOROTHIAZIDE 25 MG/1
25 TABLET ORAL DAILY
Qty: 90 TABLET | Refills: 0 | Status: SHIPPED | OUTPATIENT
Start: 2021-06-10 | End: 2021-09-09

## 2021-06-10 RX ORDER — METOPROLOL SUCCINATE 25 MG/1
25 TABLET, EXTENDED RELEASE ORAL DAILY
Qty: 90 TABLET | Refills: 0 | Status: SHIPPED | OUTPATIENT
Start: 2021-06-10 | End: 2021-09-09

## 2021-09-09 ENCOUNTER — HOSPITAL ENCOUNTER (OUTPATIENT)
Dept: CARDIOLOGY | Facility: CLINIC | Age: 31
End: 2021-09-09
Attending: INTERNAL MEDICINE
Payer: COMMERCIAL

## 2021-09-09 ENCOUNTER — LAB (OUTPATIENT)
Dept: LAB | Facility: CLINIC | Age: 31
End: 2021-09-09
Attending: INTERNAL MEDICINE
Payer: COMMERCIAL

## 2021-09-09 VITALS
HEART RATE: 86 BPM | SYSTOLIC BLOOD PRESSURE: 142 MMHG | DIASTOLIC BLOOD PRESSURE: 84 MMHG | BODY MASS INDEX: 24.85 KG/M2 | HEIGHT: 70 IN | WEIGHT: 173.6 LBS

## 2021-09-09 DIAGNOSIS — Z87.74 S/P CLOSURE OF CONGENITAL ATRIAL SEPTAL DEFECT BY PERCUTANEOUS TRANSCATHETER TECHNIQUE: ICD-10-CM

## 2021-09-09 DIAGNOSIS — I10 HYPERTENSION, UNSPECIFIED TYPE: ICD-10-CM

## 2021-09-09 DIAGNOSIS — Q23.82 CONGENITAL CLEFT LEAFLET OF MITRAL VALVE: ICD-10-CM

## 2021-09-09 DIAGNOSIS — Q21.20 ASD (ATRIAL SEPTAL DEFECT), OSTIUM PRIMUM: ICD-10-CM

## 2021-09-09 DIAGNOSIS — R00.2 PALPITATIONS: Primary | ICD-10-CM

## 2021-09-09 LAB
ANION GAP SERPL CALCULATED.3IONS-SCNC: 1 MMOL/L (ref 3–14)
BUN SERPL-MCNC: 16 MG/DL (ref 7–30)
CALCIUM SERPL-MCNC: 9.4 MG/DL (ref 8.5–10.1)
CHLORIDE BLD-SCNC: 103 MMOL/L (ref 94–109)
CO2 SERPL-SCNC: 32 MMOL/L (ref 20–32)
CREAT SERPL-MCNC: 1.08 MG/DL (ref 0.66–1.25)
GFR SERPL CREATININE-BSD FRML MDRD: 90 ML/MIN/1.73M2
GLUCOSE BLD-MCNC: 100 MG/DL (ref 70–99)
POTASSIUM BLD-SCNC: 3.9 MMOL/L (ref 3.4–5.3)
SODIUM SERPL-SCNC: 136 MMOL/L (ref 133–144)

## 2021-09-09 PROCEDURE — 93303 ECHO TRANSTHORACIC: CPT | Mod: 26 | Performed by: PEDIATRICS

## 2021-09-09 PROCEDURE — 93005 ELECTROCARDIOGRAM TRACING: CPT | Performed by: INTERNAL MEDICINE

## 2021-09-09 PROCEDURE — 93325 DOPPLER ECHO COLOR FLOW MAPG: CPT | Mod: 26 | Performed by: PEDIATRICS

## 2021-09-09 PROCEDURE — 80048 BASIC METABOLIC PNL TOTAL CA: CPT

## 2021-09-09 PROCEDURE — 99214 OFFICE O/P EST MOD 30 MIN: CPT | Performed by: INTERNAL MEDICINE

## 2021-09-09 PROCEDURE — 93320 DOPPLER ECHO COMPLETE: CPT | Mod: 26 | Performed by: PEDIATRICS

## 2021-09-09 PROCEDURE — 93325 DOPPLER ECHO COLOR FLOW MAPG: CPT

## 2021-09-09 PROCEDURE — 36415 COLL VENOUS BLD VENIPUNCTURE: CPT

## 2021-09-09 RX ORDER — HYDROCHLOROTHIAZIDE 25 MG/1
25 TABLET ORAL DAILY
Qty: 90 TABLET | Refills: 6 | Status: SHIPPED | OUTPATIENT
Start: 2021-09-09 | End: 2022-10-13

## 2021-09-09 RX ORDER — METOPROLOL SUCCINATE 25 MG/1
25 TABLET, EXTENDED RELEASE ORAL DAILY
Qty: 90 TABLET | Refills: 6 | Status: SHIPPED | OUTPATIENT
Start: 2021-09-09 | End: 2022-09-30

## 2021-09-09 ASSESSMENT — MIFFLIN-ST. JEOR: SCORE: 1743.69

## 2021-09-09 NOTE — PROGRESS NOTES
HPI and Plan:   I had the pleasure of seeing Ludwin Massey today in a shared adult cardiology clinic follow up. He is a pleasant 31 year old patient of Dr. Rojo.    Mr. Massey has a past medical history significant for primary atrial septal defect and cleft mitral valve diagnosed at 13 years of age.  He had surgery at the [facility] and was followed by Dr. Wang since his surgery in 2003.      When Dr. Rojo met Mr. Massey in 11/2017, his symptoms were primarily palpitations.  He continues to have palpations, they haven't really changed in frequency or duration over the last year. He reports that he notices them most when he is stressed, anxious, or after he has alcohol. He states that these happen 1-2 times per week and will last up to 1 min.  He has been successful in terminating these episodes with valsalva manuvers in the past.  Previous Holter and Ziopatch have not captured his symptoms. Previous ziopatch did show short runs of  SVTs and PVCs, but nothing of significance.  Previous TSH was normal.  CBC today was unremarkable. He reports normal exercise tolerance, exercising 3-4 times per week. His blood pressure is a bit elevated today, 142/84, he checked his blood pressure at home yesterday and reports that it was 128/85. He continues on Toprol-XL 25 mg and hydrodiuril 25 mg daily.  He has not had any chest pain, shortness of breath, PND orthopnea.      His echocardiogram done before his office visit today looks unchanged from previous, there is prolapse of the mitral valve anterior leaflet. Mild (1+) mitral valve insufficiency. There is no ventricular level shunting. There is no atrial level shunting. Good left and right ventricular systolic function. No pericardial effusion.    Assessment and Plan  1. Palpitations/Tachycardia.  These continue to be his primary concern.  They are now occurring 1-2 times per week, always subside with valsalva maneuvers.  We have not been able to capture them well on  previous monitoring, however since they seem to be occurring with more regularity now than in the past, we will plan to do a 2-week Zio patch in hopes that we are able to capture these events.   2. Status post repair of primary atrial septal defect and cleft mitral valve 06/2003 at [facility].    3.  Mild mitral and tricuspid insufficiency, stable.   4.  Possible celiac disease.   5.  Hypertension.  His blood pressure continues to be little bit elevated, though he reports that he has normal BP when he measures it at home.  Plan to continue to hydrochlorothiazide 25 mg daily.      Thank you for allowing me to care for Ludwin Massey today.    Vince Elena MD  Pediatric Cardiology Fellow  [facility]       Voice recognition software was used for this note, I have reviewed this note, but errors may have been missed.    ATTESTATION:  Mr. Massey was seen and examined agree with note and plan of care that was mutually determined. NEGRITA Rojo MD   35 minutes face-face, documentation and review of records on day of visit    Orders Placed This Encounter   Procedures     EKG 12-lead complete w/read - Clinics (performed today)     Leadless EKG Monitor 8 to 14 Days     No orders of the defined types were placed in this encounter.    There are no discontinued medications.      CURRENT MEDICATIONS:  Current Outpatient Medications   Medication Sig Dispense Refill     hydrochlorothiazide (HYDRODIURIL) 25 MG tablet Take 1 tablet (25 mg) by mouth daily 90 tablet 0     metoprolol succinate ER (TOPROL-XL) 25 MG 24 hr tablet Take 1 tablet (25 mg) by mouth daily 90 tablet 0       ALLERGIES   No Known Allergies    PAST MEDICAL HISTORY:  History reviewed. No pertinent past medical history.    PAST SURGICAL HISTORY:  History reviewed. No pertinent surgical history.    FAMILY HISTORY:  Family History   Problem Relation Age of Onset     Hypertension Father      Thrombosis Father  42        DVT     Other - See Comments Father         WPW     Thrombosis Mother      Colon Cancer Maternal Grandfather      Migraines Paternal Grandmother      Dementia Paternal Grandfather      Hypertension Brother      Other - See Comments Paternal Aunt         Mitral valve prolapse       SOCIAL HISTORY:  Social History     Socioeconomic History     Marital status:      Spouse name: None     Number of children: None     Years of education: None     Highest education level: None   Occupational History     None   Tobacco Use     Smoking status: Never Smoker     Smokeless tobacco: Never Used   Substance and Sexual Activity     Alcohol use: Yes     Comment: very rare     Drug use: No     Sexual activity: Yes     Partners: Female   Other Topics Concern     Parent/sibling w/ CABG, MI or angioplasty before 65F 55M? No   Social History Narrative     None     Social Determinants of Health     Financial Resource Strain:      Difficulty of Paying Living Expenses:    Food Insecurity:      Worried About Running Out of Food in the Last Year:      Ran Out of Food in the Last Year:    Transportation Needs:      Lack of Transportation (Medical):      Lack of Transportation (Non-Medical):    Physical Activity:      Days of Exercise per Week:      Minutes of Exercise per Session:    Stress:      Feeling of Stress :    Social Connections:      Frequency of Communication with Friends and Family:      Frequency of Social Gatherings with Friends and Family:      Attends Latter day Services:      Active Member of Clubs or Organizations:      Attends Club or Organization Meetings:      Marital Status:    Intimate Partner Violence:      Fear of Current or Ex-Partner:      Emotionally Abused:      Physically Abused:      Sexually Abused:        Review of Systems:  Skin:  Negative       Eyes:  Positive for glasses;contacts    ENT:  Negative      Respiratory:  Negative shortness of breath;dyspnea on exertion;cough     Cardiovascular:   "chest pain;Negative;edema;dizziness palpitations;Positive for;lightheadedness    Gastroenterology: Negative      Genitourinary:  Negative      Musculoskeletal:  Negative      Neurologic:  Positive for migraine headaches    Psychiatric:  Negative      Heme/Lymph/Imm:  Negative allergies    Endocrine:  Negative thyroid disorder;diabetes      Physical Exam:  Vitals: BP (!) 142/84 (BP Location: Right arm, Patient Position: Sitting)   Pulse 86   Ht 1.778 m (5' 10\")   Wt 78.7 kg (173 lb 9.6 oz)   BMI 24.91 kg/m       GENERAL: healthy, alert and no distress  RESP: lungs clear to auscultation - no rales, rhonchi or wheezes  CV: regular rate and rhythm, normal S1 S2, no S3 or S4, 1/6 systolic murmur, no click or rub, no peripheral edema and peripheral pulses strong  MS: no gross musculoskeletal defects noted, no edema    Psych:       Encounter Diagnoses   Name Primary?     Congenital cleft leaflet of mitral valve      Palpitations Yes           A1C RESULTS:  No results found for: A1C    INR RESULTS:  No results found for: INR        CC  Rubina Rojo MD  [address]  [place] [id number]                  "

## 2021-09-09 NOTE — PATIENT INSTRUCTIONS
You were seen today in the Adult Congenital and Cardiovascular Genetics Clinic at the [facility].    Cardiology Providers you saw during your visit:  NEGRITA Rojo MD    Diagnosis:  ASD s/p closure    Results:  NEGRITA Rojo MD reviewed the results of your lab, EKG, and echo testing today in clinic.    Recommendations:    1. Continue to eat a heart healthy, low salt diet.  2. Continue to get 20-30 minutes of aerobic activity, 4-5 days per week.  Examples of aerobic activity include walking, running, swimming, cycling, etc.  3. Continue to observe good oral hygiene, with regular dental visits.  4. Please wear a 2-week Zio patch. We will call you with the results.    SBE prophylaxis:   Yes____  No_X__    Lifelong Bacterial Endocarditis Prophylaxis:  YES____  NO____    If YES is checked, follow the recommendations outlined below:  1. Take antibiotic(s) prior to recommended dental procedures and procedures on the respiratory tract or with infected skin, muscle or bones. SBE prophylaxis is not needed for routine GI and  procedures (ie. Colonoscopy or vaginal delivery)  2. Observe good oral hygiene daily, as advised by your dentist. Get regular professional dental care.  3. Keep cuts clean.  4. Infections should be treated promptly.  5. Symptoms of Infective Endocarditis could include: fever lasting more than 4-5 days or a recurrent fever that initially resolves but returns within 1-2 days)      Exercise restrictions:   Yes__X__  No____         If yes, list restrictions:  Must be allowed to rest if fatigued or SOB      Work restrictions:  Yes____  No_X___         If yes, list restrictions:    FASTING CHOLESTEROL was checked in the last 5 years YES_X__  NO___ (2018)  Continue to eat a heart healthy, low salt diet.         ____ Fasting lipid panel order today         ____ No changes in medications          ____ I recommend the following changes in your cholesterol medications.:          ____ Please  follow up for cholesterol screening at your primary care physician      Follow-up:  Follow up with Dr. Rojo in 2 years with echo, labs, and EKG prior.     If you have questions or concerns please contact us at:    Rabia Potter, MSN, RN, CNL    Claire Umaña (Scheduling)  Nurse Care Coordinator     Clinic   Adult Congenital and CV Genetics   Adult Congenital and CV Genetic  [facility]   [facility]  (P) [phone]     (P) [phone]  [email or web address]   (F) [phone]        For after hours urgent needs, call [phone] and ask to speak to the Adult Congenital Physician on call.  Mention Job Code 0401.    For emergencies call 911.    [facility]   Clinics and Surgery Center  Mail Code [id number]  [address] [place]  [address]

## 2021-09-09 NOTE — NURSING NOTE
Cardiac Monitors: Patient was instructed regarding the indication, function, care and prompt return of a holter  monitor. Patient demonstrated understanding of this information and agreed to call with further questions or concerns.    Cardiac Testing: Patient given instructions regarding  echocardiogram . Discussed purpose, preparation, procedure and when to expect results reported back to the patient. Patient demonstrated understanding of this information and agreed to call with further questions or concerns.    Diet: Patient instructed regarding a heart healthy diet, including discussion of reduced fat and sodium intake. Patient demonstrated understanding of this information and agreed to call with further questions or concerns.    Labs: Patient was given results of the laboratory testing obtained today. Patient was instructed to return for the next laboratory testing in 2 years. Patient demonstrated understanding of this information and agreed to call with further questions or concerns.     Med Reconcile: Reviewed and verified all current medications with the patient. The updated medication list was printed and given to the patient.    Return Appointment: Patient given instructions regarding scheduling next clinic visit. Patient demonstrated understanding of this information and agreed to call with further questions or concerns.    Patient stated he understood all health information given and agreed to call with further questions or concerns.    Rabia Potter, MSN, RN, CNL  Cardiology Care Coordinator  [facility]  [phone]

## 2021-09-09 NOTE — LETTER
9/9/2021    Tamiko Flannery, APRN CNP  [address]  [facility] [address]    RE: Ludwin Massey       Dear Colleague,    I had the pleasure of seeing Ludwin Massey in the [facility].    HPI and Plan:   I had the pleasure of seeing Ludwin Massey today in a shared adult cardiology clinic follow up. He is a pleasant 31 year old patient of Dr. Rojo.    Mr. Massey has a past medical history significant for primary atrial septal defect and cleft mitral valve diagnosed at 13 years of age.  He had surgery at the [facility] and was followed by Dr. Wang since his surgery in 2003.      When Dr. Rojo met Mr. Massey in 11/2017, his symptoms were primarily palpitations.  He continues to have palpations, they haven't really changed in frequency or duration over the last year. He reports that he notices them most when he is stressed, anxious, or after he has alcohol. He states that these happen 1-2 times per week and will last up to 1 min.  He has been successful in terminating these episodes with valsalva manuvers in the past.  Previous Holter and Ziopatch have not captured his symptoms. Previous ziopatch did show short runs of  SVTs and PVCs, but nothing of significance.  Previous TSH was normal.  CBC today was unremarkable. He reports normal exercise tolerance, exercising 3-4 times per week. His blood pressure is a bit elevated today, 142/84, he checked his blood pressure at home yesterday and reports that it was 128/85. He continues on Toprol-XL 25 mg and hydrodiuril 25 mg daily.  He has not had any chest pain, shortness of breath, PND orthopnea.      His echocardiogram done before his office visit today looks unchanged from previous, there is prolapse of the mitral valve anterior leaflet. Mild (1+) mitral valve insufficiency. There is no ventricular level shunting. There is no atrial level shunting. Good left and right ventricular systolic  function. No pericardial effusion.    Assessment and Plan  1. Palpitations/Tachycardia.  These continue to be his primary concern.  They are now occurring 1-2 times per week, always subside with valsalva maneuvers.  We have not been able to capture them well on previous monitoring, however since they seem to be occurring with more regularity now than in the past, we will plan to do a 2-week Zio patch in hopes that we are able to capture these events.   2. Status post repair of primary atrial septal defect and cleft mitral valve 06/2003 at [facility].    3.  Mild mitral and tricuspid insufficiency, stable.   4.  Possible celiac disease.   5.  Hypertension.  His blood pressure continues to be little bit elevated, though he reports that he has normal BP when he measures it at home.  Plan to continue to hydrochlorothiazide 25 mg daily.      Thank you for allowing me to care for Ludwin Massey today.    Vince Elena MD  Pediatric Cardiology Fellow  [facility]       Voice recognition software was used for this note, I have reviewed this note, but errors may have been missed.    ATTESTATION:  Mr. Massey was seen and examined agree with note and plan of care that was mutually determined. NEGRITA Rojo MD   35 minutes face-face, documentation and review of records on day of visit    Orders Placed This Encounter   Procedures     EKG 12-lead complete w/read - Clinics (performed today)     Leadless EKG Monitor 8 to 14 Days     No orders of the defined types were placed in this encounter.    There are no discontinued medications.      CURRENT MEDICATIONS:  Current Outpatient Medications   Medication Sig Dispense Refill     hydrochlorothiazide (HYDRODIURIL) 25 MG tablet Take 1 tablet (25 mg) by mouth daily 90 tablet 0     metoprolol succinate ER (TOPROL-XL) 25 MG 24 hr tablet Take 1 tablet (25 mg) by mouth daily 90 tablet 0       ALLERGIES   No Known Allergies    PAST  MEDICAL HISTORY:  History reviewed. No pertinent past medical history.    PAST SURGICAL HISTORY:  History reviewed. No pertinent surgical history.    FAMILY HISTORY:  Family History   Problem Relation Age of Onset     Hypertension Father      Thrombosis Father 42        DVT     Other - See Comments Father         WPW     Thrombosis Mother      Colon Cancer Maternal Grandfather      Migraines Paternal Grandmother      Dementia Paternal Grandfather      Hypertension Brother      Other - See Comments Paternal Aunt         Mitral valve prolapse       SOCIAL HISTORY:  Social History     Socioeconomic History     Marital status:      Spouse name: None     Number of children: None     Years of education: None     Highest education level: None   Occupational History     None   Tobacco Use     Smoking status: Never Smoker     Smokeless tobacco: Never Used   Substance and Sexual Activity     Alcohol use: Yes     Comment: very rare     Drug use: No     Sexual activity: Yes     Partners: Female   Other Topics Concern     Parent/sibling w/ CABG, MI or angioplasty before 65F 55M? No   Social History Narrative     None     Social Determinants of Health     Financial Resource Strain:      Difficulty of Paying Living Expenses:    Food Insecurity:      Worried About Running Out of Food in the Last Year:      Ran Out of Food in the Last Year:    Transportation Needs:      Lack of Transportation (Medical):      Lack of Transportation (Non-Medical):    Physical Activity:      Days of Exercise per Week:      Minutes of Exercise per Session:    Stress:      Feeling of Stress :    Social Connections:      Frequency of Communication with Friends and Family:      Frequency of Social Gatherings with Friends and Family:      Attends Jainism Services:      Active Member of Clubs or Organizations:      Attends Club or Organization Meetings:      Marital Status:    Intimate Partner Violence:      Fear of Current or Ex-Partner:       "Emotionally Abused:      Physically Abused:      Sexually Abused:        Review of Systems:  Skin:  Negative       Eyes:  Positive for glasses;contacts    ENT:  Negative      Respiratory:  Negative shortness of breath;dyspnea on exertion;cough     Cardiovascular:  chest pain;Negative;edema;dizziness palpitations;Positive for;lightheadedness    Gastroenterology: Negative      Genitourinary:  Negative      Musculoskeletal:  Negative      Neurologic:  Positive for migraine headaches    Psychiatric:  Negative      Heme/Lymph/Imm:  Negative allergies    Endocrine:  Negative thyroid disorder;diabetes      Physical Exam:  Vitals: BP (!) 142/84 (BP Location: Right arm, Patient Position: Sitting)   Pulse 86   Ht 1.778 m (5' 10\")   Wt 78.7 kg (173 lb 9.6 oz)   BMI 24.91 kg/m       GENERAL: healthy, alert and no distress  RESP: lungs clear to auscultation - no rales, rhonchi or wheezes  CV: regular rate and rhythm, normal S1 S2, no S3 or S4, 1/6 systolic murmur, no click or rub, no peripheral edema and peripheral pulses strong  MS: no gross musculoskeletal defects noted, no edema    Psych:       Encounter Diagnoses   Name Primary?     Congenital cleft leaflet of mitral valve      Palpitations Yes           A1C RESULTS:  No results found for: A1C    INR RESULTS:  No results found for: INR        CC  Rubina Rojo MD  [address]  [place] [id number]                      Thank you for allowing me to participate in the care of your patient.      Sincerely,     Rubina Rojo MD     [facility]  cc:   Rubina Rojo MD  [address]  [place] [id number]        "

## 2021-09-14 ENCOUNTER — HOSPITAL ENCOUNTER (OUTPATIENT)
Dept: CARDIOLOGY | Facility: CLINIC | Age: 31
Discharge: HOME | End: 2021-09-14
Attending: INTERNAL MEDICINE | Admitting: INTERNAL MEDICINE
Payer: COMMERCIAL

## 2021-09-14 DIAGNOSIS — R00.2 PALPITATIONS: ICD-10-CM

## 2021-09-14 PROCEDURE — 93248 EXT ECG>7D<15D REV&INTERPJ: CPT | Performed by: INTERNAL MEDICINE

## 2021-09-14 PROCEDURE — 93246 EXT ECG>7D<15D RECORDING: CPT

## 2021-09-19 ENCOUNTER — HEALTH MAINTENANCE LETTER (OUTPATIENT)
Age: 31
End: 2021-09-19

## 2022-01-09 ENCOUNTER — HEALTH MAINTENANCE LETTER (OUTPATIENT)
Age: 32
End: 2022-01-09

## 2022-01-24 ENCOUNTER — OFFICE VISIT (OUTPATIENT)
Dept: DERMATOLOGY | Facility: CLINIC | Age: 32
End: 2022-01-24
Payer: COMMERCIAL

## 2022-01-24 VITALS — OXYGEN SATURATION: 100 % | HEART RATE: 84 BPM | SYSTOLIC BLOOD PRESSURE: 121 MMHG | DIASTOLIC BLOOD PRESSURE: 77 MMHG

## 2022-01-24 DIAGNOSIS — L85.9 HYPERKERATOSIS: ICD-10-CM

## 2022-01-24 DIAGNOSIS — D22.9 BENIGN NEVUS: ICD-10-CM

## 2022-01-24 DIAGNOSIS — L85.3 XEROSIS OF SKIN: ICD-10-CM

## 2022-01-24 DIAGNOSIS — B07.0 PLANTAR WARTS: Primary | ICD-10-CM

## 2022-01-24 PROCEDURE — 99213 OFFICE O/P EST LOW 20 MIN: CPT | Mod: 25 | Performed by: PHYSICIAN ASSISTANT

## 2022-01-24 PROCEDURE — 17110 DESTRUCTION B9 LES UP TO 14: CPT | Performed by: PHYSICIAN ASSISTANT

## 2022-01-24 NOTE — PROGRESS NOTES
HPI:  Ludwin Massey is a 31 year old male patient here today for spots on feet .  Patient states this has been present for a while.  Patient reports the following symptoms: pain .  Patient reports the following previous treatments: none.  Patient reports the following modifying factors: none.  Associated symptoms: none. Also has a spot on left temporal scalp line that has grown over time.  Patient has no other skin complaints today.  Remainder of the HPI, Meds, PMH, Allergies, FH, and SH was reviewed in chart.    Pertinent Hx:   No personal or family history of skin cancer    No past medical history on file.    No past surgical history on file.     Family History   Problem Relation Age of Onset     Hypertension Father      Thrombosis Father 42        DVT     Other - See Comments Father         WPW     Thrombosis Mother      Colon Cancer Maternal Grandfather      Migraines Paternal Grandmother      Dementia Paternal Grandfather      Hypertension Brother      Other - See Comments Paternal Aunt         Mitral valve prolapse       Social History     Socioeconomic History     Marital status:      Spouse name: Not on file     Number of children: Not on file     Years of education: Not on file     Highest education level: Not on file   Occupational History     Not on file   Tobacco Use     Smoking status: Never Smoker     Smokeless tobacco: Never Used   Substance and Sexual Activity     Alcohol use: Yes     Comment: very rare     Drug use: No     Sexual activity: Yes     Partners: Female   Other Topics Concern     Parent/sibling w/ CABG, MI or angioplasty before 65F 55M? No   Social History Narrative     Not on file     Social Determinants of Health     Financial Resource Strain: Not on file   Food Insecurity: Not on file   Transportation Needs: Not on file   Physical Activity: Not on file   Stress: Not on file   Social Connections: Not on file   Intimate Partner Violence: Not on file   Housing Stability: Not on  file       Outpatient Encounter Medications as of 1/24/2022   Medication Sig Dispense Refill     hydrochlorothiazide (HYDRODIURIL) 25 MG tablet Take 1 tablet (25 mg) by mouth daily 90 tablet 6     metoprolol succinate ER (TOPROL-XL) 25 MG 24 hr tablet Take 1 tablet (25 mg) by mouth daily 90 tablet 6     No facility-administered encounter medications on file as of 1/24/2022.       Review Of Systems:  Skin: spots  Eyes: negative  Ears/Nose/Throat: negative  Respiratory: No shortness of breath, dyspnea on exertion, cough, or hemoptysis  Cardiovascular: negative  Gastrointestinal: negative  Genitourinary: negative  Musculoskeletal: negative  Neurologic: negative  Psychiatric: negative  Hematologic/Lymphatic/Immunologic: negative  Endocrine: negative      Objective:     There were no vitals taken for this visit.  Eyes: Conjunctivae/lids: Normal   ENT: Lips:  Normal  MSK: Normal  Cardiovascular: Peripheral edema none  Pulm: Breathing Normal  Neuro/Psych: Orientation: A/O x 3. Normal; Mood/Affect: Normal, NAD, WDWN  Pt accompanied by: self  Following areas examined: face, scalp, feet, left ankle. Pt wearing mask  Arce skin type:ii   Findings:  Flesh-colored verrucous appearing papules on right ball of foot and right plantar big toe  Generalized flaking of skin of feet and left lateral malleolus       Assessment and Plan:     1) xerosis of skin, hyperkeratosis  Begin otc Urea cream 20% , [employer] has a 42%-apply 1-2x a day     2) plantar warts x 2  Treatment options include Cimetidine, Aldara, Efudex, OTC topicals, wart peel, metaplast tape, candida injection, Bleomycin, cantharidin topical, cryo therapy, excision, and electrocautery.   Begin otc sal acid and cover with a bandage nightly . Repeat nightly for 2-3 months  LN2: Treated with LN2 for 5s for 1-2 cycles. Warned risks of blistering, pain, pigment change, scarring, and incomplete resolution.  Advised patient to return if lesions do not completely resolve within  2-3 months.  Wound care sheet given.  3)benign nevus  Left temporal scalp-benign, watch and monitor    Treatment of these lesions would be purely cosmetic and not medically neccessary.  Lesion may recur and/or may not completely resolve. May need additional treatment.  Different removal options including excision, cryotherapy, cautery and /or laser.      Reviewed patient chart from derm 5/21/20.  Signs and Symptoms of non-melanoma skin cancer and ABCDEs. Patient was asked about new or changing moles/lesions on body.   Wear a sunscreen with at least SPF 30 on your face, ears, neck and V of the chest daily. Wear sunscreen on other areas of the body if those areas are exposed to the sun throughout the day. Sunscreens can contain physical and/or chemical blockers. Physical blockers are less likely to clog pores, these include zinc oxide and titanium dioxide. Reapply every two hour and after swimming.Sunscreen examples: https://www.ewg.org/sunscreen/    Proper skin care from [facility]:    -Eliminate harsh soaps as they strip the natural oils from the skin, often resulting in dry itchy skin ( i.e. Dial, Zest, Roxy Spring)  -Use mild soaps such as Cetaphil or Dove Sensitive Skin in the shower. You do not need to use soap on arms, legs, and trunk every time you shower unless visibly soiled.   -Avoid hot or cold showers.  -After showering, lightly dry off and apply moisturizing within 2-3 minutes. This will help trap moisture in the skin.   -Aggressive use of a moisturizer at least 1-2 times a day to the entire body (including -Vanicream, Cetaphil, Aquaphor or Cerave) and moisturize hands after every washing.  -We recommend using moisturizers that come in a tub that needs to be scooped out, not a pump. This has more of an oil base. It will hold moisture in your skin much better than a water base moisturizer. The above recommended are non-pore clogging.         It was a pleasure speaking with Ludwin Massey today.        Follow up in 2 months

## 2022-01-24 NOTE — LETTER
1/24/2022         RE: Ludwin Massey  [address] [address] [id number]        Dear Colleague,    Thank you for referring your patient, Ludwin Massey, to the [facility]. Please see a copy of my visit note below.    HPI:  Ludwin Massey is a 31 year old male patient here today for spots on feet .  Patient states this has been present for a while.  Patient reports the following symptoms: pain .  Patient reports the following previous treatments: none.  Patient reports the following modifying factors: none.  Associated symptoms: none. Also has a spot on left temporal scalp line that has grown over time.  Patient has no other skin complaints today.  Remainder of the HPI, Meds, PMH, Allergies, FH, and SH was reviewed in chart.    Pertinent Hx:   No personal or family history of skin cancer    No past medical history on file.    No past surgical history on file.     Family History   Problem Relation Age of Onset     Hypertension Father      Thrombosis Father 42        DVT     Other - See Comments Father         WPW     Thrombosis Mother      Colon Cancer Maternal Grandfather      Migraines Paternal Grandmother      Dementia Paternal Grandfather      Hypertension Brother      Other - See Comments Paternal Aunt         Mitral valve prolapse       Social History     Socioeconomic History     Marital status:      Spouse name: Not on file     Number of children: Not on file     Years of education: Not on file     Highest education level: Not on file   Occupational History     Not on file   Tobacco Use     Smoking status: Never Smoker     Smokeless tobacco: Never Used   Substance and Sexual Activity     Alcohol use: Yes     Comment: very rare     Drug use: No     Sexual activity: Yes     Partners: Female   Other Topics Concern     Parent/sibling w/ CABG, MI or angioplasty before 65F 55M? No   Social History Narrative     Not on file     Social Determinants of Health      Financial Resource Strain: Not on file   Food Insecurity: Not on file   Transportation Needs: Not on file   Physical Activity: Not on file   Stress: Not on file   Social Connections: Not on file   Intimate Partner Violence: Not on file   Housing Stability: Not on file       Outpatient Encounter Medications as of 1/24/2022   Medication Sig Dispense Refill     hydrochlorothiazide (HYDRODIURIL) 25 MG tablet Take 1 tablet (25 mg) by mouth daily 90 tablet 6     metoprolol succinate ER (TOPROL-XL) 25 MG 24 hr tablet Take 1 tablet (25 mg) by mouth daily 90 tablet 6     No facility-administered encounter medications on file as of 1/24/2022.       Review Of Systems:  Skin: spots  Eyes: negative  Ears/Nose/Throat: negative  Respiratory: No shortness of breath, dyspnea on exertion, cough, or hemoptysis  Cardiovascular: negative  Gastrointestinal: negative  Genitourinary: negative  Musculoskeletal: negative  Neurologic: negative  Psychiatric: negative  Hematologic/Lymphatic/Immunologic: negative  Endocrine: negative      Objective:     There were no vitals taken for this visit.  Eyes: Conjunctivae/lids: Normal   ENT: Lips:  Normal  MSK: Normal  Cardiovascular: Peripheral edema none  Pulm: Breathing Normal  Neuro/Psych: Orientation: A/O x 3. Normal; Mood/Affect: Normal, NAD, WDWN  Pt accompanied by: self  Following areas examined: face, scalp, feet, left ankle. Pt wearing mask  Arce skin type:ii   Findings:  Flesh-colored verrucous appearing papules on right ball of foot and right plantar big toe  Generalized flaking of skin of feet and left lateral malleolus       Assessment and Plan:     1) xerosis of skin, hyperkeratosis  Begin otc Urea cream 20% , amaz[employer] has a 42%-apply 1-2x a day     2) plantar warts x 2  Treatment options include Cimetidine, Aldara, Efudex, OTC topicals, wart peel, metaplast tape, candida injection, Bleomycin, cantharidin topical, cryo therapy, excision, and electrocautery.   Begin otc sal acid  and cover with a bandage nightly . Repeat nightly for 2-3 months  LN2: Treated with LN2 for 5s for 1-2 cycles. Warned risks of blistering, pain, pigment change, scarring, and incomplete resolution.  Advised patient to return if lesions do not completely resolve within 2-3 months.  Wound care sheet given.  3)benign nevus  Left temporal scalp-benign, watch and monitor    Treatment of these lesions would be purely cosmetic and not medically neccessary.  Lesion may recur and/or may not completely resolve. May need additional treatment.  Different removal options including excision, cryotherapy, cautery and /or laser.      Reviewed patient chart from derm 5/21/20.  Signs and Symptoms of non-melanoma skin cancer and ABCDEs. Patient was asked about new or changing moles/lesions on body.   Wear a sunscreen with at least SPF 30 on your face, ears, neck and V of the chest daily. Wear sunscreen on other areas of the body if those areas are exposed to the sun throughout the day. Sunscreens can contain physical and/or chemical blockers. Physical blockers are less likely to clog pores, these include zinc oxide and titanium dioxide. Reapply every two hour and after swimming.Sunscreen examples: https://www.ewg.org/sunscreen/    Proper skin care from [facility]:    -Eliminate harsh soaps as they strip the natural oils from the skin, often resulting in dry itchy skin ( i.e. Dial, Zest, Mauritian Spring)  -Use mild soaps such as Cetaphil or Dove Sensitive Skin in the shower. You do not need to use soap on arms, legs, and trunk every time you shower unless visibly soiled.   -Avoid hot or cold showers.  -After showering, lightly dry off and apply moisturizing within 2-3 minutes. This will help trap moisture in the skin.   -Aggressive use of a moisturizer at least 1-2 times a day to the entire body (including -Vanicream, Cetaphil, Aquaphor or Cerave) and moisturize hands after every washing.  -We recommend using moisturizers that  come in a tub that needs to be scooped out, not a pump. This has more of an oil base. It will hold moisture in your skin much better than a water base moisturizer. The above recommended are non-pore clogging.         It was a pleasure speaking with Ludwin Massey today.       Follow up in 2 months          Again, thank you for allowing me to participate in the care of your patient.        Sincerely,        Sehryl Falcon PA-C

## 2022-01-24 NOTE — PATIENT INSTRUCTIONS
Proper skin care from [facility]:    -Eliminate harsh soaps as they strip the natural oils from the skin, often resulting in dry itchy skin ( i.e. Dial, Zest, Roxy Spring)  -Use mild soaps such as Cetaphil or Dove Sensitive Skin in the shower. You do not need to use soap on arms, legs, and trunk every time you shower unless visibly soiled.   -Avoid hot or cold showers.  -After showering, lightly dry off and apply moisturizing within 2-3 minutes. This will help trap moisture in the skin.   -Aggressive use of a moisturizer at least 1-2 times a day to the entire body (including -Vanicream, Cetaphil, Aquaphor or Cerave) and moisturize hands after every washing.  -We recommend using moisturizers that come in a tub that needs to be scooped out, not a pump. This has more of an oil base. It will hold moisture in your skin much better than a water base moisturizer. The above recommended are non-pore clogging.      Wear a sunscreen with at least SPF 30 on your face, ears, neck and V of the chest daily. Wear sunscreen on other areas of the body if those areas are exposed to the sun throughout the day. Sunscreens can contain physical and/or chemical blockers. Physical blockers are less likely to clog pores, these include zinc oxide and titanium dioxide. Reapply every two hour and after swimming.     Sunscreen examples: https://www.ewg.org/sunscreen/    UV radiation  UVA radiation remains constant throughout the day and throughout the year. It is a longer wavelength than UVB and therefore penetrates deeper into the skin leading to immediate and delayed tanning, photoaging, and skin cancer. 70-80% of UVA and UVB radiation occurs between the hours of 10am-2pm.  UVB radiation  UVB radiation causes the most harmful effects and is more significant during the summer months. However, snow and ice can reflect UVB radiation leading to skin damage during the winter months as well. UVB radiation is responsible for tanning,  burning, inflammation, delayed erythema (pinkness), pigmentation (brown spots), and skin cancer.     I recommend self monthly full body exams and yearly full body exams with a dermatology provider. If you develop a new or changing lesion please follow up for examination. Most skin cancers are pink and scaly or pink and pearly. However, we do see blue/brown/black skin cancers.  Consider the ABCDEs of melanoma when giving yourself your monthly full body exam ( don't forget the groin, buttocks, feet, toes, etc). A-asymmetry, B-borders, C-color, D-diameter, E-elevation or evolving. If you see any of these changes please follow up in clinic. If you cannot see your back I recommend purchasing a hand held mirror to use with a larger wall mirror.          WARTS  Warts are caused by the Human Papilloma Virus.They are commonly spread by direct contact or autoinoculation. That mean if the wart is picked at it could spread to another area of skin.   In children without treatment 50% of warts will disappear spontaneous in 6 months, 90% are gone in 2 years. In adults they can last for a longer period of time, but they can resolve without treatment.   No method of treatment is better than the other. You just have to be consistent with your treatments and return every 4-6 weeks.   In between treatments you should use either salicylic acid or mediplast tape:    Mediplast tape: Cut to size of wart, leave tape on for 1 week, (Put duct tape over it to secure it). In 1 week, pare skin down with a pumice stone and repeat. You want to pare down until you see some pinpoint bleeding. Do this for 6-8 weeks, if no improvement, make follow up appt.     Wart stick can be purchased online. (Twice a day, warts turn white, then pare down with a pumice stone and repeat) Do this for 6-8 weeks, if no improvement, make a follow up appt.       WART TREATMENTS    Wart(s), or verruca vulgaris  are a very common, benign skin condition caused by a virus.   Since warts are caused by a viral infection, your own immune system will typically clear the lesion on average from several months to 2 years. Although wart(s) do not easily spread to others, the virus may still pass through direct contact (picking or scratching) and/or indirect contact (locker rooms/public showers).     It is important to remember that there is no cure for the wart virus. This means that warts can return at the same site or appear in a new spot.    Sometimes, it seems that new warts appear as fast as old ones go away. This happens when the old warts shed virus cells into the skin before the warts are treated. This allows new warts to grow around the first warts. The best way to prevent this is to have your dermatologist treat new warts as soon as they appear.    There are several technique/methods to making the wart(s) smaller in size or to help stimulate your immune system to clear the wart(s). The mainstay of treatment of wart(s) is to destroy the infected skin cells from the virus and to prevent recurrence.  The most important thing to remember is that regular consistent treatment is the most effective way to get rid of the wart.    Salicylic acid & Debridement   The first line treatment of warts is application of salicylic acid (with or without duct tape occlusion). Application of salicylic acid allows the wart(s) to stay flat and not callused. This allows other topical treatments to work better.      We recommend Wart Stick or Mediplast Tape over-the-counter   Directions: Apply the Wart Stick to the site twice daily or apply a piece of the Mediplast tape to the wart and cover tightly with tape to occlude the lesion.   Keep the medication on for 24 hours before removing/changing. Do this foe one week continuously.      After one week, when the salicylic acid is removed from the affected area the wart(s) area should turn the skin white/softened. Use an emery board/paring tool to rub off the  softened tissue before changing a new salicylic acid application. Make sure you dispose the emery board and do NOT reuse on another site.     Cryotherapy or Freezing   Freezing wart(s) with a very cold substance (liquid nitrogen) is an effective treatment for common warts. The wart(s) are frozen off to kill the viral activity in and around the affected area. The treated areas will become sore and/or red for the next few hours. Some adverse reactions of this treatment include: pain, blisters, blood blisters, infection, and/or lightening or darkening of the skin.   At times, when the wart is too thick and/or callused, the clinician will shave/pare down the thickened skin prior to spraying the wart(s) with liquid nitrogen.     Aldara  Cream   Aldara  Cream is a topical medication that activates your own immune system to help attack the cells infected with the virus. The cream works for resistant or recurrent warts that do not respond to freezing and/or salicylic acid.   Directions: Open the medication packet & apply to the affected area. Cover the lesion with a band-aid. The next morning, wash off the area with soap & water.  If the cream is going to work the wart(s) should get red and irritated.      Cantharidin (Canthacur/Cantharone)   Cantharidin is a chemical compound derived from a blister beetle. This liquid medication is applied to wart(s) in order to cause blistering, thus destroying the affected areas. At your  visit, application of cantharidin to the wart(s) is usually painless and the applied areas dry clear. Three to four hours after cantharidin is applied to the warts, you must wash off the area(s) with soap and water. Adverse reactions such as redness, tenderness, blistering, itching and burning sensations may occur within 2-3 days. It is expected to take 2-4 weeks for the treated areas to heal. Please follow up your provider in 6 weeks to reassess the treated areas.     Electrosurgery and  "curettage  Electrosurgery (burning) and Curettage (scraping off) of the wart(s) are often are used together. The dermatologist may remove the wart by scraping it off before or after electrosurgery. Some adverse reactions of this treatment include: pain, scarring, infection, and/or lightening or darkening of the skin.    Excision  Cutting out the wart is another option for wart treatment.  Some adverse reactions of this treatment include: pain, scarring, infection, and/or lightening or darkening of the skin.    TREATMENT RESISTANT WARTS    If the warts are hard-to-treat, we may use one of the following treatments:     Laser treatment  Laser treatment is an option, mainly for warts that have not responded to other therapies. Some adverse reactions of this treatment include: pain, scarring, infection, and/or lightening or darkening of the skin.    Chemical peels  When flat warts appear, there are usually many warts. Because so many warts appear, dermatologists often prescribe \"peeling\" methods to treat these warts. This means, either the doctor or you will apply a peeling medicine every couple weeks or at home every day. Peeling medicines include salicylic acid (stronger than you can buy at the store), tretinoin, and glycolic acid.Some adverse reactions of this treatment include: pain, scarring, infection, and/or lightening or darkening of the skin.    Bleomycin  The wart may be injected with an anti-cancer medicine, bleomycin. The shots may hurt. Some adverse reactions of this treatment include: pain, scarring, infection, and/or lightening or darkening of the skin, or nail loss if given in the fingers.    Immunotherapy  This treatment uses the patient s own immune system to fight the warts. This treatment is used when the warts remain despite other treatments. There are two types:     i) one type of immunotherapy involves applying a chemical, such as diphencyprone (DCP), to the warts. A mild allergic reaction occurs " around the treated warts. This reaction may cause the warts to go away.     ii) Another type of immunotherapy involves getting shots of Albin antigen.  The shots can boost   WOUND CARE INSTRUCTIONS  FOR CRYOSURGERY  For questions please call [phone]        This area treated with liquid nitrogen will form a blister. You do not need to bandage the area until after the blister forms and breaks (which may be a few days).  When the blister breaks, begin daily dressing changes as follows:    1) Clean and dry the area with tap water using clean Q-tip or sterile gauze pad.    2) Apply Vaseline or Aquaphor over entire wound. Other options include Polysporin ointment or Bacitracin ointment over entire wound.  Do NOT use Neosporin ointment.    3) Cover the wound with a band-aid or sterile non-stick gauze pad and micropore paper tape.      REPEAT THESE INSTRUCTIONS AT LEAST ONCE A DAY UNTIL THE WOUND HAS COMPLETELY HEALED.        It is an old wives tale that a wound heals better when it is exposed to air and allowed to dry out. The wound will heal faster with a better cosmetic result if it is kept moist with ointment and covered with a bandage.  Do not let the wound dry out.      Supplies Needed:     *Cotton tipped applicators (Q-tips)   *Polysporin ointment or Bacitracin ointment (NOT NEOSPORIN)   *Band-aids, or non stick gauze pads and micropore paper tape    PATIENT INFORMATION    During the healing process you will notice a number of changes. All wounds develop a small halo of redness surrounding the wound.  This means healing is occurring. Severe itching with extensive redness usually indicates sensitivity to the ointment or bandage tape used to dress the wound.  You should call our office if this develops.      Swelling and/or discoloration around your surgical site is common, particularly when performed around the eye.    All wounds normally drain.  The larger the wound the more drainage there will be.  After 7-10  days, you will notice the wound beginning to shrink and new skin will begin to grow.  The wound is healed when you can see skin has formed over the entire area.  A healed wound has a healthy, shiny look to the surface and is red to dark pink in color to normalize.  Wounds may take approximately 4-6 weeks to heal.  Larger wounds may take 6-8 weeks.  After the wound is healed you may discontinue dressing changes.    You may experience a sensation of tightness as your wound heals. This is normal and will gradually subside.    Your healed wound may be sensitive to temperature changes. This sensitivity improves with time, but if you re having a lot of discomfort, try to avoid temperature extremes.    Patients frequently experience itching after their wound appears to have healed because of the continue healing under the skin.  Plain Vaseline will help relieve the itching.         Urea cream 20% , [employer] has a 42%-apply 1-2x a day

## 2022-03-25 ENCOUNTER — MYC MEDICAL ADVICE (OUTPATIENT)
Dept: CARDIOLOGY | Facility: CLINIC | Age: 32
End: 2022-03-25
Payer: COMMERCIAL

## 2022-09-30 DIAGNOSIS — Z87.74 S/P CLOSURE OF CONGENITAL ATRIAL SEPTAL DEFECT BY PERCUTANEOUS TRANSCATHETER TECHNIQUE: ICD-10-CM

## 2022-09-30 DIAGNOSIS — Q23.82 CONGENITAL CLEFT LEAFLET OF MITRAL VALVE: ICD-10-CM

## 2022-09-30 DIAGNOSIS — Q21.20 ASD (ATRIAL SEPTAL DEFECT), OSTIUM PRIMUM: ICD-10-CM

## 2022-09-30 DIAGNOSIS — I10 HYPERTENSION, UNSPECIFIED TYPE: ICD-10-CM

## 2022-09-30 RX ORDER — METOPROLOL SUCCINATE 25 MG/1
25 TABLET, EXTENDED RELEASE ORAL DAILY
Qty: 90 TABLET | Refills: 0 | Status: SHIPPED | OUTPATIENT
Start: 2022-09-30 | End: 2022-12-30

## 2022-09-30 NOTE — PROGRESS NOTES
[facility] Refill Guideline reviewed.  Medication meets criteria for refill.  BLAYNE Pruitt RN

## 2022-10-08 DIAGNOSIS — I10 HYPERTENSION, UNSPECIFIED TYPE: ICD-10-CM

## 2022-10-13 RX ORDER — HYDROCHLOROTHIAZIDE 25 MG/1
25 TABLET ORAL DAILY
Qty: 90 TABLET | Refills: 6 | Status: SHIPPED | OUTPATIENT
Start: 2022-10-13 | End: 2023-11-09

## 2022-10-27 ENCOUNTER — VIRTUAL VISIT (OUTPATIENT)
Dept: UROLOGY | Facility: CLINIC | Age: 32
End: 2022-10-27
Payer: COMMERCIAL

## 2022-10-27 DIAGNOSIS — Z30.09 VASECTOMY EVALUATION: Primary | ICD-10-CM

## 2022-10-27 PROCEDURE — 99203 OFFICE O/P NEW LOW 30 MIN: CPT | Mod: 95 | Performed by: UROLOGY

## 2022-10-27 NOTE — PROGRESS NOTES
Ludwin is a 32 year old who is being evaluated via a billable video visit.      How would you like to obtain your AVS? MyChart  If the video visit is dropped, the invitation should be resent by: Text to cell phone: [phone]  Will anyone else be joining your video visit? No            Subjective   Ludwin is a 32 year old, presenting for the following health issues:  Video Visit      HPI     Patient is interested in vasectomy.  He has 2 children.      Review of Systems   Constitutional, HEENT, cardiovascular, pulmonary, gi and gu systems are negative, except as otherwise noted.      Objective           Vitals:  No vitals were obtained today due to virtual visit.    Physical Exam   GENERAL: Healthy, alert and no distress  EYES: Eyes grossly normal to inspection.  No discharge or erythema, or obvious scleral/conjunctival abnormalities.  RESP: No audible wheeze, cough, or visible cyanosis.  No visible retractions or increased work of breathing.    SKIN: Visible skin clear. No significant rash, abnormal pigmentation or lesions.  NEURO: Cranial nerves grossly intact.  Mentation and speech appropriate for age.  PSYCH: Mentation appears normal, affect normal/bright, judgement and insight intact, normal speech and appearance well-groomed.        Discussed    That vasectomy is permanent method of birth control.    That vasectomy can fail due to recanalization of the vas even many months/years later.    That he needs 2 negative sperm checks to be considered sterile    That there are other methods that are not permanent and also that the sperm can be frozen for later use.    How the technique is performed, risks of infection, bleeding, damage to the testes vessels and testes atrophy    Long term complication such as chronic and difficult to treat testes pain and questionable increase incidence of prostate cancer    That the procedure can be done at the clinic or hospital OR        Plan:    Stop Aspirin  Will schedule Vasectomy  in the future          Video-Visit Details    Video Start Time: 805    Type of service:  Video Visit    Video End Time:815    Originating Location (pt. Location): Home        Distant Location (provider location):  On-site    Platform used for Video Visit: Micaela

## 2022-11-02 ENCOUNTER — IMMUNIZATION (OUTPATIENT)
Dept: FAMILY MEDICINE | Facility: CLINIC | Age: 32
End: 2022-11-02
Payer: COMMERCIAL

## 2022-11-02 PROCEDURE — 90471 IMMUNIZATION ADMIN: CPT

## 2022-11-02 PROCEDURE — 90686 IIV4 VACC NO PRSV 0.5 ML IM: CPT

## 2022-12-30 DIAGNOSIS — Q21.20 ASD (ATRIAL SEPTAL DEFECT), OSTIUM PRIMUM: ICD-10-CM

## 2022-12-30 DIAGNOSIS — Z87.74 S/P CLOSURE OF CONGENITAL ATRIAL SEPTAL DEFECT BY PERCUTANEOUS TRANSCATHETER TECHNIQUE: ICD-10-CM

## 2022-12-30 DIAGNOSIS — Q23.82 CONGENITAL CLEFT LEAFLET OF MITRAL VALVE: ICD-10-CM

## 2022-12-30 DIAGNOSIS — I10 HYPERTENSION, UNSPECIFIED TYPE: ICD-10-CM

## 2022-12-30 RX ORDER — METOPROLOL SUCCINATE 25 MG/1
25 TABLET, EXTENDED RELEASE ORAL DAILY
Qty: 90 TABLET | Refills: 0 | Status: SHIPPED | OUTPATIENT
Start: 2022-12-30 | End: 2023-04-04

## 2023-03-29 DIAGNOSIS — Z87.74 S/P CLOSURE OF CONGENITAL ATRIAL SEPTAL DEFECT BY PERCUTANEOUS TRANSCATHETER TECHNIQUE: ICD-10-CM

## 2023-03-29 DIAGNOSIS — Q23.82 CONGENITAL CLEFT LEAFLET OF MITRAL VALVE: ICD-10-CM

## 2023-03-29 DIAGNOSIS — Q21.20 ASD (ATRIAL SEPTAL DEFECT), OSTIUM PRIMUM: ICD-10-CM

## 2023-03-29 DIAGNOSIS — I10 HYPERTENSION, UNSPECIFIED TYPE: ICD-10-CM

## 2023-04-04 RX ORDER — METOPROLOL SUCCINATE 25 MG/1
25 TABLET, EXTENDED RELEASE ORAL DAILY
Qty: 90 TABLET | Refills: 1 | Status: SHIPPED | OUTPATIENT
Start: 2023-04-04 | End: 2023-10-23

## 2023-04-15 ENCOUNTER — HEALTH MAINTENANCE LETTER (OUTPATIENT)
Age: 33
End: 2023-04-15

## 2023-07-13 ENCOUNTER — LAB (OUTPATIENT)
Dept: LAB | Facility: CLINIC | Age: 33
End: 2023-07-13
Attending: INTERNAL MEDICINE
Payer: COMMERCIAL

## 2023-07-13 ENCOUNTER — OFFICE VISIT (OUTPATIENT)
Dept: CARDIOLOGY | Facility: CLINIC | Age: 33
End: 2023-07-13
Payer: COMMERCIAL

## 2023-07-13 ENCOUNTER — HOSPITAL ENCOUNTER (OUTPATIENT)
Dept: CARDIOLOGY | Facility: CLINIC | Age: 33
Discharge: HOME | End: 2023-07-13
Attending: INTERNAL MEDICINE
Payer: COMMERCIAL

## 2023-07-13 VITALS
HEIGHT: 70 IN | SYSTOLIC BLOOD PRESSURE: 118 MMHG | HEART RATE: 63 BPM | OXYGEN SATURATION: 99 % | DIASTOLIC BLOOD PRESSURE: 80 MMHG | BODY MASS INDEX: 24.85 KG/M2 | WEIGHT: 173.6 LBS

## 2023-07-13 DIAGNOSIS — I10 HYPERTENSION, UNSPECIFIED TYPE: ICD-10-CM

## 2023-07-13 DIAGNOSIS — Q21.20 ASD (ATRIAL SEPTAL DEFECT), OSTIUM PRIMUM: ICD-10-CM

## 2023-07-13 DIAGNOSIS — R00.2 PALPITATIONS: ICD-10-CM

## 2023-07-13 DIAGNOSIS — Z87.74 S/P CLOSURE OF CONGENITAL ATRIAL SEPTAL DEFECT BY PERCUTANEOUS TRANSCATHETER TECHNIQUE: ICD-10-CM

## 2023-07-13 DIAGNOSIS — Q23.82 CONGENITAL CLEFT LEAFLET OF MITRAL VALVE: ICD-10-CM

## 2023-07-13 LAB
ALBUMIN SERPL BCG-MCNC: 4.8 G/DL (ref 3.5–5.2)
ALP SERPL-CCNC: 50 U/L (ref 40–129)
ALT SERPL W P-5'-P-CCNC: 21 U/L (ref 0–70)
ANION GAP SERPL CALCULATED.3IONS-SCNC: 10 MMOL/L (ref 7–15)
AST SERPL W P-5'-P-CCNC: 25 U/L (ref 0–45)
BASOPHILS # BLD AUTO: 0.1 10E3/UL (ref 0–0.2)
BASOPHILS NFR BLD AUTO: 1 %
BILIRUB SERPL-MCNC: 0.8 MG/DL
BUN SERPL-MCNC: 17.5 MG/DL (ref 6–20)
CALCIUM SERPL-MCNC: 9.6 MG/DL (ref 8.6–10)
CHLORIDE SERPL-SCNC: 100 MMOL/L (ref 98–107)
CHOLEST SERPL-MCNC: 147 MG/DL
CREAT SERPL-MCNC: 1.04 MG/DL (ref 0.67–1.17)
DEPRECATED HCO3 PLAS-SCNC: 29 MMOL/L (ref 22–29)
EOSINOPHIL # BLD AUTO: 0.2 10E3/UL (ref 0–0.7)
EOSINOPHIL NFR BLD AUTO: 3 %
ERYTHROCYTE [DISTWIDTH] IN BLOOD BY AUTOMATED COUNT: 12.2 % (ref 10–15)
GFR SERPL CREATININE-BSD FRML MDRD: >90 ML/MIN/1.73M2
GLUCOSE SERPL-MCNC: 102 MG/DL (ref 70–99)
HCT VFR BLD AUTO: 45.3 % (ref 40–53)
HDLC SERPL-MCNC: 64 MG/DL
HGB BLD-MCNC: 15.6 G/DL (ref 13.3–17.7)
IMM GRANULOCYTES # BLD: 0.1 10E3/UL
IMM GRANULOCYTES NFR BLD: 1 %
LDLC SERPL CALC-MCNC: 70 MG/DL
LYMPHOCYTES # BLD AUTO: 1.9 10E3/UL (ref 0.8–5.3)
LYMPHOCYTES NFR BLD AUTO: 32 %
MCH RBC QN AUTO: 31.3 PG (ref 26.5–33)
MCHC RBC AUTO-ENTMCNC: 34.4 G/DL (ref 31.5–36.5)
MCV RBC AUTO: 91 FL (ref 78–100)
MONOCYTES # BLD AUTO: 0.4 10E3/UL (ref 0–1.3)
MONOCYTES NFR BLD AUTO: 7 %
NEUTROPHILS # BLD AUTO: 3.4 10E3/UL (ref 1.6–8.3)
NEUTROPHILS NFR BLD AUTO: 56 %
NONHDLC SERPL-MCNC: 83 MG/DL
NRBC # BLD AUTO: 0 10E3/UL
NRBC BLD AUTO-RTO: 0 /100
PLATELET # BLD AUTO: 168 10E3/UL (ref 150–450)
POTASSIUM SERPL-SCNC: 4.1 MMOL/L (ref 3.4–5.3)
PROT SERPL-MCNC: 6.9 G/DL (ref 6.4–8.3)
RBC # BLD AUTO: 4.99 10E6/UL (ref 4.4–5.9)
SODIUM SERPL-SCNC: 139 MMOL/L (ref 136–145)
TRIGL SERPL-MCNC: 65 MG/DL
TSH SERPL DL<=0.005 MIU/L-ACNC: 2.24 UIU/ML (ref 0.3–4.2)
WBC # BLD AUTO: 6 10E3/UL (ref 4–11)

## 2023-07-13 PROCEDURE — 80061 LIPID PANEL: CPT

## 2023-07-13 PROCEDURE — 85004 AUTOMATED DIFF WBC COUNT: CPT

## 2023-07-13 PROCEDURE — 36415 COLL VENOUS BLD VENIPUNCTURE: CPT

## 2023-07-13 PROCEDURE — 84443 ASSAY THYROID STIM HORMONE: CPT

## 2023-07-13 PROCEDURE — 93000 ELECTROCARDIOGRAM COMPLETE: CPT | Performed by: INTERNAL MEDICINE

## 2023-07-13 PROCEDURE — 93320 DOPPLER ECHO COMPLETE: CPT | Mod: 26 | Performed by: PEDIATRICS

## 2023-07-13 PROCEDURE — 93325 DOPPLER ECHO COLOR FLOW MAPG: CPT | Mod: 26 | Performed by: PEDIATRICS

## 2023-07-13 PROCEDURE — 99214 OFFICE O/P EST MOD 30 MIN: CPT | Performed by: INTERNAL MEDICINE

## 2023-07-13 PROCEDURE — 80053 COMPREHEN METABOLIC PANEL: CPT

## 2023-07-13 PROCEDURE — 93325 DOPPLER ECHO COLOR FLOW MAPG: CPT

## 2023-07-13 PROCEDURE — 93303 ECHO TRANSTHORACIC: CPT | Mod: 26 | Performed by: PEDIATRICS

## 2023-07-13 NOTE — LETTER
7/13/2023    Tamiko Flannery, APRN CNP  [address] [facility] [address]  [facility] [address]    RE: Ludwin Massey       Dear Colleague,     I had the pleasure of seeing Ludwin Massey in the [facility].  CARDIOLOGY CONSULTATION:    Mr. Massey is a very pleasant 33-year-old gentleman with a past medical history significant for primary atrial septal defect and cleft mitral valve diagnosed at 13 years of age.  He had surgery at the [facility] and was followed by Dr. Wang originally.      When I met Mr. Massey for the first time in 11/2017, his symptoms were primarily palpitations and those improved with stopping alcohol use. 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  He has a 5 year old daughter and 7 month old son and both have structurally normal hearts. His daughter at age 3 complained of palpitations but the holter showed nothing.      As part of his evaluation today he underwent an echo and that looks great; his valve is doing well. He has no concerning cardiac symptoms.       PAST MEDICAL HISTORY:  History reviewed. No pertinent past medical history.    CURRENT MEDICATIONS:  Current Outpatient Medications   Medication Sig Dispense Refill    hydrochlorothiazide (HYDRODIURIL) 25 MG tablet Take 1 tablet (25 mg) by mouth daily 90 tablet 6    metoprolol succinate ER (TOPROL XL) 25 MG 24 hr tablet Take 1 tablet (25 mg) by mouth daily Patient is due for an appointment for further refills 90 tablet 1       PAST SURGICAL HISTORY:  History reviewed. No pertinent surgical history.    ALLERGIES  Patient has no known allergies.    FAMILY HX:  Family History   Problem Relation Age of Onset    Hypertension Father     Thrombosis Father 42        DVT    Other - See Comments Father         WPW    Thrombosis Mother     Colon Cancer Maternal Grandfather     Migraines Paternal Grandmother     Dementia Paternal Grandfather     Hypertension Brother     Other - See Comments Paternal Aunt  "        Mitral valve prolapse       SOCIAL HX:  Social History     Socioeconomic History    Marital status:      Spouse name: None    Number of children: None    Years of education: None    Highest education level: None   Tobacco Use    Smoking status: Never    Smokeless tobacco: Never   Substance and Sexual Activity    Alcohol use: Yes     Comment: very rare    Drug use: No    Sexual activity: Yes     Partners: Female   Other Topics Concern    Parent/sibling w/ CABG, MI or angioplasty before 65F 55M? No       ROS:  Constitutional: No fever, chills, or sweats. No weight gain/loss.   ENT: No visual disturbance, ear ache, epistaxis, sore throat.   Allergies/Immunologic: Negative.   Respiratory: No cough, hemoptysis.   Cardiovascular: As per HPI.   GI: No nausea, vomiting, hematemesis, melena, or hematochezia.   : No urinary frequency, dysuria, or hematuria.   Integument: Negative.   Psychiatric: Negative.   Neuro: Negative.   Endocrinology: Negative.   Musculoskeletal: No myalgia.    VITAL SIGNS:  /80 (BP Location: Left arm, Patient Position: Sitting)   Pulse 63   Ht 1.778 m (5' 10\")   Wt 78.7 kg (173 lb 9.6 oz)   SpO2 99%   BMI 24.91 kg/m    Body mass index is 24.91 kg/m .  Wt Readings from Last 2 Encounters:   07/13/23 78.7 kg (173 lb 9.6 oz)   09/09/21 78.7 kg (173 lb 9.6 oz)       PHYSICAL EXAM  Ludwin Massey IS A 33 year old male.in no acute distress.  HEENT: Unremarkable.  Neck: JVP normal.      LABS    Lab Results   Component Value Date    WBC 6.0 07/13/2023    WBC 7.2 12/12/2019     Lab Results   Component Value Date    RBC 4.99 07/13/2023    RBC 4.82 12/12/2019     Lab Results   Component Value Date    HGB 15.6 07/13/2023    HGB 15.4 12/12/2019     Lab Results   Component Value Date    HCT 45.3 07/13/2023    HCT 44.9 12/12/2019     No components found for: MCT  Lab Results   Component Value Date    MCV 91 07/13/2023    MCV 93 12/12/2019     Lab Results   Component Value Date    MCH 31.3 " 07/13/2023    MCH 32.0 12/12/2019     Lab Results   Component Value Date    MCHC 34.4 07/13/2023    MCHC 34.3 12/12/2019     Lab Results   Component Value Date    RDW 12.2 07/13/2023    RDW 12.3 12/12/2019     Lab Results   Component Value Date     07/13/2023     12/12/2019      Recent Labs   Lab Test 07/13/23  0819 09/09/21  0745    136   POTASSIUM 4.1 3.9   CHLORIDE 100 103   CO2 29 32   ANIONGAP 10 1*   * 100*   BUN 17.5 16   CR 1.04 1.08   BHUPENDRA 9.6 9.4     Recent Labs   Lab Test 10/11/18  0750 04/13/17  0927   CHOL 132 124   HDL 59 55   LDL 62 51   TRIG 54 90   NHDL 73 69           IMPRESSION/PLAN:   1.  Status post repair of primary atrial septal defect and cleft mitral valve 06/2003 at [facility].  We do not have this operative report.   2.  Mild mitral and tricuspid insufficiency, stable.   3.  Hypertension controlled     DISCUSSION:  It was a pleasure to see Mr. Massey in followup.  Clinically, he is doing well with no concerning cardiac symptoms.  Echo looks great. Weight is perfect. Plan follow up in 2-3 years with an echo.    He understands and is in agreement with the plan as outlined.  All questions were answered.        It was a pleasure to see him.  Please do not hesitate to contact me with any questions or concerns.         ETHAN ROJO MD    31 minutes face-face, documentation and review of records on day of visit       Thank you for allowing me to participate in the care of your patient.      Sincerely,     Ethan Rojo MD     [facility]  cc:   No referring provider defined for this encounter.

## 2023-07-13 NOTE — PROGRESS NOTES
CARDIOLOGY CONSULTATION:    Mr. Massey is a very pleasant 33-year-old gentleman with a past medical history significant for primary atrial septal defect and cleft mitral valve diagnosed at 13 years of age.  He had surgery at the [facility] and was followed by Dr. Wang originally.      When I met Mr. Massey for the first time in 11/2017, his symptoms were primarily palpitations and those improved with stopping alcohol use. 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  He has a 5 year old daughter and 7 month old son and both have structurally normal hearts. His daughter at age 3 complained of palpitations but the holter showed nothing.      As part of his evaluation today he underwent an echo and that looks great; his valve is doing well. He has no concerning cardiac symptoms.       PAST MEDICAL HISTORY:  History reviewed. No pertinent past medical history.    CURRENT MEDICATIONS:  Current Outpatient Medications   Medication Sig Dispense Refill     hydrochlorothiazide (HYDRODIURIL) 25 MG tablet Take 1 tablet (25 mg) by mouth daily 90 tablet 6     metoprolol succinate ER (TOPROL XL) 25 MG 24 hr tablet Take 1 tablet (25 mg) by mouth daily Patient is due for an appointment for further refills 90 tablet 1       PAST SURGICAL HISTORY:  History reviewed. No pertinent surgical history.    ALLERGIES  Patient has no known allergies.    FAMILY HX:  Family History   Problem Relation Age of Onset     Hypertension Father      Thrombosis Father 42        DVT     Other - See Comments Father         WPW     Thrombosis Mother      Colon Cancer Maternal Grandfather      Migraines Paternal Grandmother      Dementia Paternal Grandfather      Hypertension Brother      Other - See Comments Paternal Aunt         Mitral valve prolapse       SOCIAL HX:  Social History     Socioeconomic History     Marital status:      Spouse name: None     Number of children: None     Years of education: None      "Highest education level: None   Tobacco Use     Smoking status: Never     Smokeless tobacco: Never   Substance and Sexual Activity     Alcohol use: Yes     Comment: very rare     Drug use: No     Sexual activity: Yes     Partners: Female   Other Topics Concern     Parent/sibling w/ CABG, MI or angioplasty before 65F 55M? No       ROS:  Constitutional: No fever, chills, or sweats. No weight gain/loss.   ENT: No visual disturbance, ear ache, epistaxis, sore throat.   Allergies/Immunologic: Negative.   Respiratory: No cough, hemoptysis.   Cardiovascular: As per HPI.   GI: No nausea, vomiting, hematemesis, melena, or hematochezia.   : No urinary frequency, dysuria, or hematuria.   Integument: Negative.   Psychiatric: Negative.   Neuro: Negative.   Endocrinology: Negative.   Musculoskeletal: No myalgia.    VITAL SIGNS:  /80 (BP Location: Left arm, Patient Position: Sitting)   Pulse 63   Ht 1.778 m (5' 10\")   Wt 78.7 kg (173 lb 9.6 oz)   SpO2 99%   BMI 24.91 kg/m    Body mass index is 24.91 kg/m .  Wt Readings from Last 2 Encounters:   07/13/23 78.7 kg (173 lb 9.6 oz)   09/09/21 78.7 kg (173 lb 9.6 oz)       PHYSICAL EXAM  Ludwin Massey IS A 33 year old male.in no acute distress.  HEENT: Unremarkable.  Neck: JVP normal.      LABS    Lab Results   Component Value Date    WBC 6.0 07/13/2023    WBC 7.2 12/12/2019     Lab Results   Component Value Date    RBC 4.99 07/13/2023    RBC 4.82 12/12/2019     Lab Results   Component Value Date    HGB 15.6 07/13/2023    HGB 15.4 12/12/2019     Lab Results   Component Value Date    HCT 45.3 07/13/2023    HCT 44.9 12/12/2019     No components found for: MCT  Lab Results   Component Value Date    MCV 91 07/13/2023    MCV 93 12/12/2019     Lab Results   Component Value Date    MCH 31.3 07/13/2023    MCH 32.0 12/12/2019     Lab Results   Component Value Date    MCHC 34.4 07/13/2023    MCHC 34.3 12/12/2019     Lab Results   Component Value Date    RDW 12.2 07/13/2023    RDW " 12.3 12/12/2019     Lab Results   Component Value Date     07/13/2023     12/12/2019      Recent Labs   Lab Test 07/13/23  0819 09/09/21  0745    136   POTASSIUM 4.1 3.9   CHLORIDE 100 103   CO2 29 32   ANIONGAP 10 1*   * 100*   BUN 17.5 16   CR 1.04 1.08   BHUPENDRA 9.6 9.4     Recent Labs   Lab Test 10/11/18  0750 04/13/17  0927   CHOL 132 124   HDL 59 55   LDL 62 51   TRIG 54 90   NHDL 73 69           IMPRESSION/PLAN:   1.  Status post repair of primary atrial septal defect and cleft mitral valve 06/2003 at [facility].  We do not have this operative report.   2.  Mild mitral and tricuspid insufficiency, stable.   3.  Hypertension controlled     DISCUSSION:  It was a pleasure to see Mr. Massey in followup.  Clinically, he is doing well with no concerning cardiac symptoms.  Echo looks great. Weight is perfect. Plan follow up in 2-3 years with an echo.    He understands and is in agreement with the plan as outlined.  All questions were answered.        It was a pleasure to see him.  Please do not hesitate to contact me with any questions or concerns.         ETHAN ANDREWS MD    31 minutes face-face, documentation and review of records on day of visit

## 2023-07-13 NOTE — PATIENT INSTRUCTIONS
You were seen today in the Adult Congenital and Cardiovascular Genetics Clinic at the [facility].    Cardiology Providers you saw during your visit:  NEGRITA Rojo MD    Diagnosis:  ASD closure and mitral valve repair    Results:  NEGRITA Rojo MD reviewed the results of your EKG, lab and echo testing today in clinic.    Recommendations for you:    No changes today      General Cardiac Recommendations:  Continue to eat a heart healthy, low salt diet.  Continue to get 20-30 minutes of aerobic activity, 4-5 days per week.  Examples of aerobic activity include walking, running, swimming, cycling, etc.  Continue to observe good oral hygiene, with regular dental visits.      SBE prophylaxis:   Yes____  No__X__      Exercise restrictions:   Yes__X__  No____         If yes, list restrictions:  Must be allowed to rest if fatigued or SOB      FASTING CHOLESTEROL was checked in the last 5 years YES__X_  NO___ (2023)      Follow-up:  Follow up with Dr Rojo in 2-3 years with an echo prior     If you have questions or concerns please contact us at:    Nidhi Cruz RN, BSN   Claire Umaña (Scheduling)  Nurse Care Coordinator     Clinic   Adult Congenital and CV Genetics   Adult Congenital and CV Genetic  [facility]  (P) [phone]     (P) [phone]            (F) [phone]        For after hours urgent needs, call [phone] and ask to speak to the Adult Congenital Physician on call.  Mention Job Code 0401.    For emergencies call 911.    [facility]   Clinics and [facility] Center  Mail Code [id number]  [address] [place] [address]

## 2023-10-18 ENCOUNTER — VIRTUAL VISIT (OUTPATIENT)
Dept: UROLOGY | Facility: CLINIC | Age: 33
End: 2023-10-18
Payer: COMMERCIAL

## 2023-10-18 VITALS — WEIGHT: 175 LBS | HEIGHT: 70 IN | BODY MASS INDEX: 25.05 KG/M2

## 2023-10-18 DIAGNOSIS — Z30.2 ENCOUNTER FOR STERILIZATION: Primary | ICD-10-CM

## 2023-10-18 PROCEDURE — 99213 OFFICE O/P EST LOW 20 MIN: CPT | Mod: VID | Performed by: UROLOGY

## 2023-10-18 ASSESSMENT — PAIN SCALES - GENERAL: PAINLEVEL: NO PAIN (0)

## 2023-10-18 NOTE — NURSING NOTE
Is the patient currently in the state of [place]? YES    Visit mode:VIDEO    If the visit is dropped, the patient can be reconnected by: VIDEO VISIT: Text to cell phone:   Telephone Information:   Mobile [phone]       Will anyone else be joining the visit? NO  (If patient encounters technical issues they should call [phone]    How would you like to obtain your AVS? MyChart    Are changes needed to the allergy or medication list? No    Reason for visit: RECHECK   Bobbilynn Grossaint VVF

## 2023-10-18 NOTE — PROGRESS NOTES
VASECTOMY CONSULTATION NOTE  [facility]  [phone]  DATE OF VISIT: 10/18/2023    PATIENT NAME: Ludwin Massey    YOB: 1990      REASON FOR CONSULTATION: Mr. Ludwin Massey is a 33 year old year old gentleman who is being seen as a virtual visit in the urology clinic today requesting a vasectomy. He has 2 children and he wishes to have a vasectomy for birth control. He has no prior urologic history and has had no prior surgery on the testicles. He has no symptoms in the testicles.    PAST MEDICAL HISTORY: No past medical history on file.    PAST SURGICAL HISTORY: No past surgical history on file.    MEDICATIONS:   Current Outpatient Medications:     hydrochlorothiazide (HYDRODIURIL) 25 MG tablet, Take 1 tablet (25 mg) by mouth daily, Disp: 90 tablet, Rfl: 6    metoprolol succinate ER (TOPROL XL) 25 MG 24 hr tablet, Take 1 tablet (25 mg) by mouth daily Patient is due for an appointment for further refills, Disp: 90 tablet, Rfl: 1    ALLERGIES: No Known Allergies    FAMILY HISTORY:   Family History   Problem Relation Age of Onset    Hypertension Father     Thrombosis Father 42        DVT    Other - See Comments Father         WPW    Thrombosis Mother     Colon Cancer Maternal Grandfather     Migraines Paternal Grandmother     Dementia Paternal Grandfather     Hypertension Brother     Other - See Comments Paternal Aunt         Mitral valve prolapse       SOCIAL HISTORY:   Social History     Socioeconomic History    Marital status:      Spouse name: Not on file    Number of children: Not on file    Years of education: Not on file    Highest education level: Not on file   Occupational History    Not on file   Tobacco Use    Smoking status: Never    Smokeless tobacco: Never   Substance and Sexual Activity    Alcohol use: Yes     Comment: very rare    Drug use: No    Sexual activity: Yes     Partners: Female   Other Topics Concern    Parent/sibling w/ CABG, MI or angioplasty before 65F  "55M? No   Social History Narrative    Not on file     Social Determinants of Health     Financial Resource Strain: Not on file   Food Insecurity: Not on file   Transportation Needs: Not on file   Physical Activity: Not on file   Stress: Not on file   Social Connections: Not on file   Interpersonal Safety: Not on file   Housing Stability: Not on file       REVIEW OF SYSTEMS:  Skin: No rash, pruritis, or skin pigmentation  Eyes: No changes in vision  Ears/Nose/Throat: No changes in hearing, no nosebleeds  Respiratory: No shortness of breath, dyspnea on exertion, cough, or hemoptysis  Cardiovascular: No chest pain or palpitations  Gastrointestinal: No diarrhea or constipation. No abdominal pain. No hematochezia  Genitourinary: see HPI  Musculoskeletal: No pain or swelling of joints, normal range of motion  Neurologic: No weakness or tremors  Psychiatric: No recent changes in memory or mood  Hematologic/Lymphatic/Immunologic: No easy bruising or enlarged lymph nodes  Endocrine: No weight gain or loss      HEIGHT: 5' 10\"     WEIGHT: 175 lbs 0 oz   BP: Data Unavailable    PULSE: Data Unavailable    EXAM:   General: Alert and oriented to time, place, and self. In NAD   HEENT: Head AT/NC, EOMI, CN Grossly intact   Lungs: no respiratory distress, or pursed lip breathing   Heart: No obvious jugular venous distension present   Musculoskeltal: Normal movements. Normal appearing musculature  Skin: no suspicious lesions or rashes   Neuro: Alert, oriented, speech and mentation normal; moving all 4 extremities equally.   Psych: affect and mood normal      DIAGNOSIS: Request for sterilization    PLAN: The risks of the procedure as well as expectations for recovery and outcomes were splint in detail to him.  He was counseled on the risks for bleeding infection and pain after the procedure.  He was instructed to continue to use contraception until he had proven azoospermia on a semen specimen.  This would normally be collected at " least 3 months after the procedure.  He was instructed to hold all anticoagulants medications for one week prior to the procedure.  He was also instructed to shave the scrotum prior to procedure.  It was recommended that he have someone else drive him home after his vasectomy.  In light of these risks and expectations he would like to proceed.  We are scheduling a vasectomy in the office in the near future.  This visit today was performed via video.  He understands that because of this I was not able to examine the testicles in person today.  I will perform an examination at the beginning of the vasectomy and he understands that there is a small chance the procedure may need to be moved to the operating room.    Naren Calero M.D.      Virtual Visit Details    Type of service:  Video Visit     Originating Location (pt. Location): Home    Distant Location (provider location):  On-site  Platform used for Video Visit: Mary Lou

## 2023-10-18 NOTE — LETTER
10/18/2023       RE: Ludwin Massey  [address] [facility] [id number]     Dear Colleague,    Thank you for referring your patient, Ludwin Massey, to the [facility] [place] at [facility]. Please see a copy of my visit note below.    VASECTOMY CONSULTATION NOTE  [facility]  [phone]  DATE OF VISIT: 10/18/2023    PATIENT NAME: Ludwin Massey    YOB: 1990      REASON FOR CONSULTATION: Mr. Ludwin Massey is a 33 year old year old gentleman who is being seen as a virtual visit in the urology clinic today requesting a vasectomy. He has 2 children and he wishes to have a vasectomy for birth control. He has no prior urologic history and has had no prior surgery on the testicles. He has no symptoms in the testicles.    PAST MEDICAL HISTORY: No past medical history on file.    PAST SURGICAL HISTORY: No past surgical history on file.    MEDICATIONS:   Current Outpatient Medications:     hydrochlorothiazide (HYDRODIURIL) 25 MG tablet, Take 1 tablet (25 mg) by mouth daily, Disp: 90 tablet, Rfl: 6    metoprolol succinate ER (TOPROL XL) 25 MG 24 hr tablet, Take 1 tablet (25 mg) by mouth daily Patient is due for an appointment for further refills, Disp: 90 tablet, Rfl: 1    ALLERGIES: No Known Allergies    FAMILY HISTORY:   Family History   Problem Relation Age of Onset    Hypertension Father     Thrombosis Father 42        DVT    Other - See Comments Father         WPW    Thrombosis Mother     Colon Cancer Maternal Grandfather     Migraines Paternal Grandmother     Dementia Paternal Grandfather     Hypertension Brother     Other - See Comments Paternal Aunt         Mitral valve prolapse       SOCIAL HISTORY:   Social History     Socioeconomic History    Marital status:      Spouse name: Not on file    Number of children: Not on file    Years of education: Not on file    Highest education level: Not on file  "  Occupational History    Not on file   Tobacco Use    Smoking status: Never    Smokeless tobacco: Never   Substance and Sexual Activity    Alcohol use: Yes     Comment: very rare    Drug use: No    Sexual activity: Yes     Partners: Female   Other Topics Concern    Parent/sibling w/ CABG, MI or angioplasty before 65F 55M? No   Social History Narrative    Not on file     Social Determinants of Health     Financial Resource Strain: Not on file   Food Insecurity: Not on file   Transportation Needs: Not on file   Physical Activity: Not on file   Stress: Not on file   Social Connections: Not on file   Interpersonal Safety: Not on file   Housing Stability: Not on file       REVIEW OF SYSTEMS:  Skin: No rash, pruritis, or skin pigmentation  Eyes: No changes in vision  Ears/Nose/Throat: No changes in hearing, no nosebleeds  Respiratory: No shortness of breath, dyspnea on exertion, cough, or hemoptysis  Cardiovascular: No chest pain or palpitations  Gastrointestinal: No diarrhea or constipation. No abdominal pain. No hematochezia  Genitourinary: see HPI  Musculoskeletal: No pain or swelling of joints, normal range of motion  Neurologic: No weakness or tremors  Psychiatric: No recent changes in memory or mood  Hematologic/Lymphatic/Immunologic: No easy bruising or enlarged lymph nodes  Endocrine: No weight gain or loss      HEIGHT: 5' 10\"     WEIGHT: 175 lbs 0 oz   BP: Data Unavailable    PULSE: Data Unavailable    EXAM:   General: Alert and oriented to time, place, and self. In NAD   HEENT: Head AT/NC, EOMI, CN Grossly intact   Lungs: no respiratory distress, or pursed lip breathing   Heart: No obvious jugular venous distension present   Musculoskeltal: Normal movements. Normal appearing musculature  Skin: no suspicious lesions or rashes   Neuro: Alert, oriented, speech and mentation normal; moving all 4 extremities equally.   Psych: affect and mood normal      DIAGNOSIS: Request for sterilization    PLAN: The risks of the " procedure as well as expectations for recovery and outcomes were splint in detail to him.  He was counseled on the risks for bleeding infection and pain after the procedure.  He was instructed to continue to use contraception until he had proven azoospermia on a semen specimen.  This would normally be collected at least 3 months after the procedure.  He was instructed to hold all anticoagulants medications for one week prior to the procedure.  He was also instructed to shave the scrotum prior to procedure.  It was recommended that he have someone else drive him home after his vasectomy.  In light of these risks and expectations he would like to proceed.  We are scheduling a vasectomy in the office in the near future.  This visit today was performed via video.  He understands that because of this I was not able to examine the testicles in person today.  I will perform an examination at the beginning of the vasectomy and he understands that there is a small chance the procedure may need to be moved to the operating room.    Naren Calero M.D.      Virtual Visit Details    Type of service:  Video Visit     Originating Location (pt. Location): Home    Distant Location (provider location):  On-site  Platform used for Video Visit: Mary Lou

## 2023-10-23 DIAGNOSIS — I10 HYPERTENSION, UNSPECIFIED TYPE: ICD-10-CM

## 2023-10-23 DIAGNOSIS — Q21.20 ASD (ATRIAL SEPTAL DEFECT), OSTIUM PRIMUM: ICD-10-CM

## 2023-10-23 DIAGNOSIS — Q23.82 CONGENITAL CLEFT LEAFLET OF MITRAL VALVE: ICD-10-CM

## 2023-10-23 DIAGNOSIS — Z87.74 S/P CLOSURE OF CONGENITAL ATRIAL SEPTAL DEFECT BY PERCUTANEOUS TRANSCATHETER TECHNIQUE: ICD-10-CM

## 2023-10-30 RX ORDER — METOPROLOL SUCCINATE 25 MG/1
25 TABLET, EXTENDED RELEASE ORAL DAILY
Qty: 90 TABLET | Refills: 3 | Status: SHIPPED | OUTPATIENT
Start: 2023-10-30

## 2023-11-09 DIAGNOSIS — I10 HYPERTENSION, UNSPECIFIED TYPE: ICD-10-CM

## 2023-11-10 ENCOUNTER — OFFICE VISIT (OUTPATIENT)
Dept: UROLOGY | Facility: CLINIC | Age: 33
End: 2023-11-10
Payer: COMMERCIAL

## 2023-11-10 VITALS
DIASTOLIC BLOOD PRESSURE: 86 MMHG | SYSTOLIC BLOOD PRESSURE: 140 MMHG | HEIGHT: 70 IN | WEIGHT: 173 LBS | BODY MASS INDEX: 24.77 KG/M2

## 2023-11-10 DIAGNOSIS — Z30.2 ENCOUNTER FOR STERILIZATION: Primary | ICD-10-CM

## 2023-11-10 PROCEDURE — 55250 REMOVAL OF SPERM DUCT(S): CPT | Performed by: UROLOGY

## 2023-11-10 PROCEDURE — 88302 TISSUE EXAM BY PATHOLOGIST: CPT | Performed by: PATHOLOGY

## 2023-11-10 RX ORDER — LIDOCAINE HYDROCHLORIDE 20 MG/ML
20 INJECTION, SOLUTION INFILTRATION; PERINEURAL ONCE
Status: COMPLETED | OUTPATIENT
Start: 2023-11-10 | End: 2023-11-10

## 2023-11-10 RX ADMIN — LIDOCAINE HYDROCHLORIDE 20 ML: 20 INJECTION, SOLUTION INFILTRATION; PERINEURAL at 08:45

## 2023-11-10 ASSESSMENT — PAIN SCALES - GENERAL: PAINLEVEL: NO PAIN (0)

## 2023-11-10 NOTE — PROGRESS NOTES
OFFICE VASECTOMY OPERATIVE NOTE  [facility]  ([phone]    DATE: 11/10/23  PATIENT: Ludwin Massey    YOB: 1990    Ludwin Massey is a 33 year old male.  He has 2 children and he wishes a vasectomy for birth control.  He has read the brochure and he has shaved himself.  I reviewed the vasectomy procedure with him explaining that it would be done with a local anesthetic given just in the location where the vasectomy would be done.  It would be done through scalpel-less incisions with the removal of segments of the vasa, cauterization of the ends, and burying the ends separate with sutures.      Pt. Understands:  1/1000-1/3000 risk of future pregnancy even with perfectly done vasectomy  -vasectomy is a permanent procedure    -he may cryopreserve sperm if he wishes   -1-5% risk of post-vasectomy pain syndrome   -1-5% risk of complication, primarily infection or bleeding  - he needs to have a semen sample that shows no sperm before getting approval for unprotected intercourse.      Complications such as bleeding, infection, and damage to other tissues in the area were discussed.  I recommended that an ice bag be placed on the scrotum off and on tonight to help reduce pain and swelling.      He was reminded that he was not sterile immediately after the vasectomy that it would take at least 20 ejaculations to empty the vas of any remaining sperm.  He was not to provide a semen sample until after the 20th ejaculation and not before 12 weeks after the vas. He was  to fulfill both of those requirements.   He understands it is his responsibility to find out the results of the vas before proceeding with intercourse without birth control protection.  Other items discussed were activity afterwards, returning to work, voluntary physical activity,  resuming sexual activity, clothing to wear, bathing, and care of the vas site and expected changes in the site as healing progresses.  After signing the  permit, bilateral vasectomy was done as described through scalpel-less incisions.       ANESTHESIA: Local    DETAILS OF PROCEDURE: The risks of the procedure were explained in detail to the patient and informed consent was obtained. The patient was placed supine on the procedure table and the penis and scrotum were prepped and draped in the standard sterile fashion. The right vas deferens was isolated and brought up to the median raphe of the scrotum. 1% lidocaine local anesthesia was used to infiltrate the skin and the spermatic cord. A sharp hemostat was used to make a skin puncture. Adventitial tissues were swept away from the vas. A 1 cm segment of the vas was excised and sent for pathology. The proximal and distal lumina of the vas were cauterized and then each segment was tied off in a knuckling-fashion with a 3-0 chromic suture. Hemostasis was ensured and the segments were released back into the scrotum. Next the left vas was brought up to the same incision and a vasectomy was performed in the similar fashion. At the end of the procedure a single 3-0 chromic suture was placed in the skin.     COMPLICATIONS: None    DISMISSAL INSTRUCTIONS:  - Ice pack to scrotum 15 to 20 minutes each hour awake for 36 to 40 hours.  - No strenuous activity or ejaculation for 14 days.  - No unprotected sexual activity until proven azoospermia on semen samples at 3 months.  - Referred to patient handout for normal postop expectations and indications to contact nurse or physician.    M.D.: Naren Calero M.D.

## 2023-11-10 NOTE — PATIENT INSTRUCTIONS
POST VASECTOMY INSTRUCTIONS    1.) If you have any concerns or questions, please contact our office at [phone] and choose option 2.      2.) It is okay to take a shower, however, do not soak in water (bath,swimming, hot tub,etc....) until your incision is healed.    3.) You might notice some swelling, mild bruising, and discomfort for several days after your vasectomy. This is to be expected. For at least the next 24 hours, an ice pack should be applied for 20 minutes every hour that you are awake. Ice will help with discomfort and swelling. Do not place directly on the skin.    4.) No intercourse, strenuous activity or exercise for at least 7-10 days, even if you feel fine.    5.) You need to wear good scrotal support while you are healing. We strongly recommend an athletic supporter or a pair of regular briefs that are one size too small. Boxer briefs do not offer enough support.    6.) Tylenol as directed on the bottle is preferred for discomfort. Please avoid any blood thinning products such as ibuprofen and aspirin (Motrin, Advil, Excedrin, Aleve, ect..) for at least the next week.    7.) It is normal to have mild drainage from the incision area for several days. However, please contact our office if you notice: bright red blood that does not stop after three days, increased pain, heat at the incision, red streaks, foul smelling discharge, or if you start to run a fever.     8.) YOU MUST CONTINUE BIRTH CONTROL UNTIL WE CONFIRM YOUR STERILITY.  This process can take up to a year to complete (rare occurrence).     9.) You have been given a form with specimen cup and instructions for your follow up specimen. You will be cleared once we receive ONE negative specimen. If your specimen comes back positive (sperm seen) you will be asked to repeat the test. This does not mean that your vasectomy has failed.

## 2023-11-10 NOTE — NURSING NOTE
Chief Complaint   Patient presents with    Sterilization     Vasectomy     Patient has signed the consent form stating that we will be doing a bilateral vasectomy today and that this is the correct procedure.  I verbally confirmed the patient's identity using two indicators, relevant allergies, and that the correct equipment was available. Patient was cleaned and prepped according to the appropriate policy.  Equipment was prepped in a sterile fashion and MD was informed that patient was ready.    Consent read and signed: Yes  Aspirin/blood thinning products stopped 7 days prior to procedure: Yes  No Known Allergies    Physician performed procedure.  After the procedure the patient was instructed to wait approximately three months and at least 30 ejaculations prior to returning a sample to confirm sterility.  The patient was instructed to bring in sample within 3-6 hours post sample ejaculation.  Any additional medications after the procedure were sent to the patient's pharmacy and instructions were given according to company protocol and the performing physician.  The physician performing the procedure prescribed as they felt appropriate.  Patient was also instructed to avoid heavy lifting or strenuous activity for 10-14 days and to ice the scrotum.  Samples from the R and L vas deferens were sent to the lab and an order was placed for future semen analysis.      The following medication was given:     MEDICATION:  Lidocaine 2% Soln  ROUTE:  Infiltration  SITE: Scrotum  DOSE: 13  LOT #: [id number]  : Hospira  EXPIRATION DATE: 1 FEB 2025  NDC#:  4117-5940-80  Was there drug waste? Yes  Amount of drug waste (mL): 7.  Reason for waste:  As per MD  Multi-dose vial: Yes    Alana Solitario, EMT

## 2023-11-10 NOTE — LETTER
11/10/2023       RE: Ludwin Massey  [address] [address] [facility] [address]     Dear Colleague,    Thank you for referring your patient, Ludwin Massey, to the [facility] [place] [facility]. Please see a copy of my visit note below.    OFFICE VASECTOMY OPERATIVE NOTE  [facility]  ([phone]    DATE: 11/10/23  PATIENT: Ludwin Massey    YOB: 1990    Ludwin Massey is a 33 year old male.  He has 2 children and he wishes a vasectomy for birth control.  He has read the brochure and he has shaved himself.  I reviewed the vasectomy procedure with him explaining that it would be done with a local anesthetic given just in the location where the vasectomy would be done.  It would be done through scalpel-less incisions with the removal of segments of the vasa, cauterization of the ends, and burying the ends separate with sutures.      Pt. Understands:  1/1000-1/3000 risk of future pregnancy even with perfectly done vasectomy  -vasectomy is a permanent procedure    -he may cryopreserve sperm if he wishes   -1-5% risk of post-vasectomy pain syndrome   -1-5% risk of complication, primarily infection or bleeding  - he needs to have a semen sample that shows no sperm before getting approval for unprotected intercourse.      Complications such as bleeding, infection, and damage to other tissues in the area were discussed.  I recommended that an ice bag be placed on the scrotum off and on tonight to help reduce pain and swelling.      He was reminded that he was not sterile immediately after the vasectomy that it would take at least 20 ejaculations to empty the vas of any remaining sperm.  He was not to provide a semen sample until after the 20th ejaculation and not before 12 weeks after the vas. He was  to fulfill both of those requirements.   He understands it is his responsibility to find out the results of the vas before  proceeding with intercourse without birth control protection.  Other items discussed were activity afterwards, returning to work, voluntary physical activity,  resuming sexual activity, clothing to wear, bathing, and care of the vas site and expected changes in the site as healing progresses.  After signing the permit, bilateral vasectomy was done as described through scalpel-less incisions.       ANESTHESIA: Local    DETAILS OF PROCEDURE: The risks of the procedure were explained in detail to the patient and informed consent was obtained. The patient was placed supine on the procedure table and the penis and scrotum were prepped and draped in the standard sterile fashion. The right vas deferens was isolated and brought up to the median raphe of the scrotum. 1% lidocaine local anesthesia was used to infiltrate the skin and the spermatic cord. A sharp hemostat was used to make a skin puncture. Adventitial tissues were swept away from the vas. A 1 cm segment of the vas was excised and sent for pathology. The proximal and distal lumina of the vas were cauterized and then each segment was tied off in a knuckling-fashion with a 3-0 chromic suture. Hemostasis was ensured and the segments were released back into the scrotum. Next the left vas was brought up to the same incision and a vasectomy was performed in the similar fashion. At the end of the procedure a single 3-0 chromic suture was placed in the skin.     COMPLICATIONS: None    DISMISSAL INSTRUCTIONS:  - Ice pack to scrotum 15 to 20 minutes each hour awake for 36 to 40 hours.  - No strenuous activity or ejaculation for 14 days.  - No unprotected sexual activity until proven azoospermia on semen samples at 3 months.  - Referred to patient handout for normal postop expectations and indications to contact nurse or physician.    M.D.: Naren Calero M.D.

## 2023-11-14 LAB
PATH REPORT.COMMENTS IMP SPEC: NORMAL
PATH REPORT.COMMENTS IMP SPEC: NORMAL
PATH REPORT.FINAL DX SPEC: NORMAL
PATH REPORT.GROSS SPEC: NORMAL
PATH REPORT.MICROSCOPIC SPEC OTHER STN: NORMAL
PATH REPORT.RELEVANT HX SPEC: NORMAL
PHOTO IMAGE: NORMAL

## 2023-11-15 ENCOUNTER — TELEPHONE (OUTPATIENT)
Dept: CARDIOLOGY | Facility: CLINIC | Age: 33
End: 2023-11-15
Payer: COMMERCIAL

## 2023-11-15 RX ORDER — HYDROCHLOROTHIAZIDE 25 MG/1
25 TABLET ORAL DAILY
Qty: 90 TABLET | Refills: 3 | Status: SHIPPED | OUTPATIENT
Start: 2023-11-15

## 2023-11-15 NOTE — TELEPHONE ENCOUNTER
Pt called stating he previously got an Emergency Refill from [facility] for RX hydrochlorothiazide to last him through the weekend. Pt stated they RX as of yesterday has ran out and is waiting for the approval to get another RX sent. Pt is has questions about wether or not he should take the med right away when he gets it for today would be the first day he hasn't taken it at the regular scheduled time he usually does. Pt would like a call back to discuss.

## 2023-11-15 NOTE — TELEPHONE ENCOUNTER
[facility] Call Center    Phone Message    May a detailed message be left on voicemail: yes     Reason for Call: Medication Refill Request    Has the patient contacted the pharmacy for the refill? Yes   Name of medication being requested: hydrochlorothiazide (HYDRODIURIL) 25 MG tablet  Provider who prescribed the medication: Dr. Rojo  Pharmacy: [id number] [place], [place] - [address] AT [facility] [address]    Date medication is needed: 11/15/2023       Action Taken: Cardiology    Travel Screening: Not Applicable      Thank you!  [facility]

## 2023-11-15 NOTE — TELEPHONE ENCOUNTER
Informed him that renewal of RX was sent today, asked about symptoms since he's been off hydrochlorothiazide since yesterday. No SOB, no edema or discomfort. Advised then that he may restart taking it tomorrow. Invited patient to call back if any further issues, states understanding.

## 2024-06-22 ENCOUNTER — HEALTH MAINTENANCE LETTER (OUTPATIENT)
Age: 34
End: 2024-06-22

## 2024-07-09 ENCOUNTER — LAB (OUTPATIENT)
Dept: LAB | Facility: CLINIC | Age: 34
End: 2024-07-09
Payer: COMMERCIAL

## 2024-07-09 DIAGNOSIS — Z30.2 ENCOUNTER FOR STERILIZATION: ICD-10-CM

## 2024-07-09 LAB — SEMEN ANALYSIS P VAS PNL: NORMAL

## 2024-07-09 PROCEDURE — 89321 SEMEN ANAL SPERM DETECTION: CPT

## 2024-10-30 DIAGNOSIS — Z87.74 S/P CLOSURE OF CONGENITAL ATRIAL SEPTAL DEFECT BY PERCUTANEOUS TRANSCATHETER TECHNIQUE: ICD-10-CM

## 2024-10-30 DIAGNOSIS — Q21.20 ASD (ATRIAL SEPTAL DEFECT), OSTIUM PRIMUM: ICD-10-CM

## 2024-10-30 DIAGNOSIS — I10 HYPERTENSION, UNSPECIFIED TYPE: ICD-10-CM

## 2024-10-30 DIAGNOSIS — Q23.82 CONGENITAL CLEFT LEAFLET OF MITRAL VALVE: ICD-10-CM

## 2024-10-30 RX ORDER — METOPROLOL SUCCINATE 25 MG/1
25 TABLET, EXTENDED RELEASE ORAL DAILY
Qty: 90 TABLET | Refills: 3 | Status: SHIPPED | OUTPATIENT
Start: 2024-10-30

## 2024-10-30 RX ORDER — HYDROCHLOROTHIAZIDE 25 MG/1
25 TABLET ORAL DAILY
Qty: 90 TABLET | Refills: 3 | Status: SHIPPED | OUTPATIENT
Start: 2024-10-30

## 2025-01-16 ENCOUNTER — APPOINTMENT (OUTPATIENT)
Dept: CT IMAGING | Facility: CLINIC | Age: 35
End: 2025-01-16
Attending: EMERGENCY MEDICINE
Payer: COMMERCIAL

## 2025-01-16 ENCOUNTER — APPOINTMENT (OUTPATIENT)
Dept: MRI IMAGING | Facility: CLINIC | Age: 35
End: 2025-01-16
Attending: EMERGENCY MEDICINE
Payer: COMMERCIAL

## 2025-01-16 ENCOUNTER — HOSPITAL ENCOUNTER (OUTPATIENT)
Facility: CLINIC | Age: 35
Setting detail: OBSERVATION
End: 2025-01-16
Attending: EMERGENCY MEDICINE | Admitting: HOSPITALIST
Payer: COMMERCIAL

## 2025-01-16 ENCOUNTER — APPOINTMENT (OUTPATIENT)
Dept: ULTRASOUND IMAGING | Facility: CLINIC | Age: 35
End: 2025-01-16
Attending: EMERGENCY MEDICINE
Payer: COMMERCIAL

## 2025-01-16 VITALS
BODY MASS INDEX: 28.44 KG/M2 | WEIGHT: 198.63 LBS | SYSTOLIC BLOOD PRESSURE: 124 MMHG | TEMPERATURE: 98.2 F | HEIGHT: 70 IN | HEART RATE: 53 BPM | OXYGEN SATURATION: 98 % | DIASTOLIC BLOOD PRESSURE: 72 MMHG | RESPIRATION RATE: 16 BRPM

## 2025-01-16 DIAGNOSIS — H34.232 BRANCH RETINAL ARTERY OCCLUSION OF LEFT EYE: ICD-10-CM

## 2025-01-16 DIAGNOSIS — G44.219 EPISODIC TENSION-TYPE HEADACHE, NOT INTRACTABLE: Primary | ICD-10-CM

## 2025-01-16 DIAGNOSIS — I10 PRIMARY HYPERTENSION: ICD-10-CM

## 2025-01-16 LAB
ALBUMIN SERPL BCG-MCNC: 4.9 G/DL (ref 3.5–5.2)
ALP SERPL-CCNC: 58 U/L (ref 40–150)
ALT SERPL W P-5'-P-CCNC: 29 U/L (ref 0–70)
ANION GAP SERPL CALCULATED.3IONS-SCNC: 8 MMOL/L (ref 7–15)
AST SERPL W P-5'-P-CCNC: 34 U/L (ref 0–45)
ATRIAL RATE - MUSE: 66 BPM
BASOPHILS # BLD AUTO: 0.1 10E3/UL (ref 0–0.2)
BASOPHILS NFR BLD AUTO: 1 %
BILIRUB DIRECT SERPL-MCNC: <0.2 MG/DL (ref 0–0.3)
BILIRUB SERPL-MCNC: 0.2 MG/DL
BUN SERPL-MCNC: 10.6 MG/DL (ref 6–20)
CALCIUM SERPL-MCNC: 10.3 MG/DL (ref 8.8–10.4)
CHLORIDE SERPL-SCNC: 100 MMOL/L (ref 98–107)
CREAT SERPL-MCNC: 0.98 MG/DL (ref 0.67–1.17)
DIASTOLIC BLOOD PRESSURE - MUSE: NORMAL MMHG
EGFRCR SERPLBLD CKD-EPI 2021: >90 ML/MIN/1.73M2
EOSINOPHIL # BLD AUTO: 0.1 10E3/UL (ref 0–0.7)
EOSINOPHIL NFR BLD AUTO: 1 %
ERYTHROCYTE [DISTWIDTH] IN BLOOD BY AUTOMATED COUNT: 11.8 % (ref 10–15)
EST. AVERAGE GLUCOSE BLD GHB EST-MCNC: 103 MG/DL
GLUCOSE SERPL-MCNC: 116 MG/DL (ref 70–99)
HBA1C MFR BLD: 5.2 %
HCO3 SERPL-SCNC: 31 MMOL/L (ref 22–29)
HCT VFR BLD AUTO: 45.5 % (ref 40–53)
HGB BLD-MCNC: 15.6 G/DL (ref 13.3–17.7)
HOLD SPECIMEN: NORMAL
HOLD SPECIMEN: NORMAL
IMM GRANULOCYTES # BLD: 0 10E3/UL
IMM GRANULOCYTES NFR BLD: 1 %
INR PPP: 0.92 (ref 0.85–1.15)
INTERPRETATION ECG - MUSE: NORMAL
LYMPHOCYTES # BLD AUTO: 1.5 10E3/UL (ref 0.8–5.3)
LYMPHOCYTES NFR BLD AUTO: 21 %
MCH RBC QN AUTO: 31.8 PG (ref 26.5–33)
MCHC RBC AUTO-ENTMCNC: 34.3 G/DL (ref 31.5–36.5)
MCV RBC AUTO: 93 FL (ref 78–100)
MONOCYTES # BLD AUTO: 0.4 10E3/UL (ref 0–1.3)
MONOCYTES NFR BLD AUTO: 6 %
NEUTROPHILS # BLD AUTO: 5 10E3/UL (ref 1.6–8.3)
NEUTROPHILS NFR BLD AUTO: 71 %
NRBC # BLD AUTO: 0 10E3/UL
NRBC BLD AUTO-RTO: 0 /100
P AXIS - MUSE: 18 DEGREES
PLATELET # BLD AUTO: 212 10E3/UL (ref 150–450)
POTASSIUM SERPL-SCNC: 3.8 MMOL/L (ref 3.4–5.3)
PR INTERVAL - MUSE: 200 MS
PROT SERPL-MCNC: 7.5 G/DL (ref 6.4–8.3)
QRS DURATION - MUSE: 102 MS
QT - MUSE: 442 MS
QTC - MUSE: 463 MS
R AXIS - MUSE: 24 DEGREES
RBC # BLD AUTO: 4.91 10E6/UL (ref 4.4–5.9)
SODIUM SERPL-SCNC: 139 MMOL/L (ref 135–145)
SYSTOLIC BLOOD PRESSURE - MUSE: NORMAL MMHG
T AXIS - MUSE: 55 DEGREES
TROPONIN T SERPL HS-MCNC: 11 NG/L
TROPONIN T SERPL HS-MCNC: 11 NG/L
VENTRICULAR RATE- MUSE: 66 BPM
WBC # BLD AUTO: 7.1 10E3/UL (ref 4–11)

## 2025-01-16 PROCEDURE — 99222 1ST HOSP IP/OBS MODERATE 55: CPT | Performed by: PHYSICIAN ASSISTANT

## 2025-01-16 PROCEDURE — 250N000011 HC RX IP 250 OP 636: Performed by: EMERGENCY MEDICINE

## 2025-01-16 PROCEDURE — 36415 COLL VENOUS BLD VENIPUNCTURE: CPT | Performed by: EMERGENCY MEDICINE

## 2025-01-16 PROCEDURE — 99285 EMERGENCY DEPT VISIT HI MDM: CPT | Mod: 25

## 2025-01-16 PROCEDURE — 93005 ELECTROCARDIOGRAM TRACING: CPT

## 2025-01-16 PROCEDURE — 250N000009 HC RX 250: Performed by: EMERGENCY MEDICINE

## 2025-01-16 PROCEDURE — 80048 BASIC METABOLIC PNL TOTAL CA: CPT | Performed by: EMERGENCY MEDICINE

## 2025-01-16 PROCEDURE — G0378 HOSPITAL OBSERVATION PER HR: HCPCS

## 2025-01-16 PROCEDURE — 80053 COMPREHEN METABOLIC PANEL: CPT | Performed by: EMERGENCY MEDICINE

## 2025-01-16 PROCEDURE — 70496 CT ANGIOGRAPHY HEAD: CPT

## 2025-01-16 PROCEDURE — 70450 CT HEAD/BRAIN W/O DYE: CPT

## 2025-01-16 PROCEDURE — 85610 PROTHROMBIN TIME: CPT | Performed by: EMERGENCY MEDICINE

## 2025-01-16 PROCEDURE — 85025 COMPLETE CBC W/AUTO DIFF WBC: CPT | Performed by: EMERGENCY MEDICINE

## 2025-01-16 PROCEDURE — 83036 HEMOGLOBIN GLYCOSYLATED A1C: CPT | Performed by: PHYSICIAN ASSISTANT

## 2025-01-16 PROCEDURE — 80061 LIPID PANEL: CPT | Performed by: PHYSICIAN ASSISTANT

## 2025-01-16 PROCEDURE — 70553 MRI BRAIN STEM W/O & W/DYE: CPT

## 2025-01-16 PROCEDURE — 255N000002 HC RX 255 OP 636: Performed by: EMERGENCY MEDICINE

## 2025-01-16 PROCEDURE — 96374 THER/PROPH/DIAG INJ IV PUSH: CPT

## 2025-01-16 PROCEDURE — 85014 HEMATOCRIT: CPT | Performed by: EMERGENCY MEDICINE

## 2025-01-16 PROCEDURE — 84484 ASSAY OF TROPONIN QUANT: CPT | Performed by: EMERGENCY MEDICINE

## 2025-01-16 PROCEDURE — A9585 GADOBUTROL INJECTION: HCPCS | Performed by: EMERGENCY MEDICINE

## 2025-01-16 PROCEDURE — 250N000013 HC RX MED GY IP 250 OP 250 PS 637: Performed by: EMERGENCY MEDICINE

## 2025-01-16 PROCEDURE — 82247 BILIRUBIN TOTAL: CPT | Performed by: EMERGENCY MEDICINE

## 2025-01-16 PROCEDURE — 93971 EXTREMITY STUDY: CPT | Mod: LT

## 2025-01-16 PROCEDURE — 82248 BILIRUBIN DIRECT: CPT | Performed by: EMERGENCY MEDICINE

## 2025-01-16 RX ORDER — LIDOCAINE 40 MG/G
CREAM TOPICAL
Status: DISCONTINUED | OUTPATIENT
Start: 2025-01-16 | End: 2025-01-17 | Stop reason: HOSPADM

## 2025-01-16 RX ORDER — ACETAMINOPHEN 325 MG/1
650 TABLET ORAL EVERY 4 HOURS PRN
Status: DISCONTINUED | OUTPATIENT
Start: 2025-01-16 | End: 2025-01-17

## 2025-01-16 RX ORDER — ONDANSETRON 4 MG/1
4 TABLET, ORALLY DISINTEGRATING ORAL EVERY 6 HOURS PRN
Status: DISCONTINUED | OUTPATIENT
Start: 2025-01-16 | End: 2025-01-17 | Stop reason: HOSPADM

## 2025-01-16 RX ORDER — CLOPIDOGREL BISULFATE 75 MG/1
75 TABLET ORAL DAILY
Status: DISCONTINUED | OUTPATIENT
Start: 2025-01-17 | End: 2025-01-17

## 2025-01-16 RX ORDER — ASPIRIN 325 MG
325 TABLET ORAL ONCE
Status: COMPLETED | OUTPATIENT
Start: 2025-01-16 | End: 2025-01-16

## 2025-01-16 RX ORDER — ACETAMINOPHEN 325 MG/10.15ML
650 LIQUID ORAL EVERY 4 HOURS PRN
Status: DISCONTINUED | OUTPATIENT
Start: 2025-01-16 | End: 2025-01-17

## 2025-01-16 RX ORDER — ONDANSETRON 2 MG/ML
4 INJECTION INTRAMUSCULAR; INTRAVENOUS EVERY 6 HOURS PRN
Status: DISCONTINUED | OUTPATIENT
Start: 2025-01-16 | End: 2025-01-17 | Stop reason: HOSPADM

## 2025-01-16 RX ORDER — GADOBUTROL 604.72 MG/ML
9 INJECTION INTRAVENOUS ONCE
Status: COMPLETED | OUTPATIENT
Start: 2025-01-16 | End: 2025-01-16

## 2025-01-16 RX ORDER — ASPIRIN 81 MG/1
81 TABLET ORAL DAILY
Status: DISCONTINUED | OUTPATIENT
Start: 2025-01-17 | End: 2025-01-17 | Stop reason: HOSPADM

## 2025-01-16 RX ORDER — IOPAMIDOL 755 MG/ML
500 INJECTION, SOLUTION INTRAVASCULAR ONCE
Status: COMPLETED | OUTPATIENT
Start: 2025-01-16 | End: 2025-01-16

## 2025-01-16 RX ORDER — KETOROLAC TROMETHAMINE 15 MG/ML
15 INJECTION, SOLUTION INTRAMUSCULAR; INTRAVENOUS ONCE
Status: COMPLETED | OUTPATIENT
Start: 2025-01-16 | End: 2025-01-16

## 2025-01-16 RX ORDER — ASPIRIN 81 MG/1
81 TABLET, CHEWABLE ORAL DAILY
Status: DISCONTINUED | OUTPATIENT
Start: 2025-01-17 | End: 2025-01-17 | Stop reason: HOSPADM

## 2025-01-16 RX ORDER — ACETAMINOPHEN 325 MG/1
325-650 TABLET ORAL EVERY 6 HOURS PRN
Status: ON HOLD | COMMUNITY
End: 2025-01-17

## 2025-01-16 RX ORDER — IBUPROFEN 200 MG
200 TABLET ORAL EVERY 6 HOURS PRN
COMMUNITY

## 2025-01-16 RX ORDER — METOPROLOL SUCCINATE 25 MG/1
25 TABLET, EXTENDED RELEASE ORAL DAILY
Status: DISCONTINUED | OUTPATIENT
Start: 2025-01-17 | End: 2025-01-17 | Stop reason: HOSPADM

## 2025-01-16 RX ORDER — ACETAMINOPHEN 650 MG/1
650 SUPPOSITORY RECTAL EVERY 4 HOURS PRN
Status: DISCONTINUED | OUTPATIENT
Start: 2025-01-16 | End: 2025-01-17

## 2025-01-16 RX ORDER — CLOPIDOGREL BISULFATE 75 MG/1
300 TABLET ORAL ONCE
Status: COMPLETED | OUTPATIENT
Start: 2025-01-16 | End: 2025-01-16

## 2025-01-16 RX ADMIN — CLOPIDOGREL BISULFATE 300 MG: 75 TABLET ORAL at 18:31

## 2025-01-16 RX ADMIN — SODIUM CHLORIDE 80 ML: 9 INJECTION, SOLUTION INTRAVENOUS at 13:57

## 2025-01-16 RX ADMIN — GADOBUTROL 9 ML: 604.72 INJECTION INTRAVENOUS at 15:57

## 2025-01-16 RX ADMIN — KETOROLAC TROMETHAMINE 15 MG: 15 INJECTION, SOLUTION INTRAMUSCULAR; INTRAVENOUS at 17:13

## 2025-01-16 RX ADMIN — ASPIRIN 325 MG ORAL TABLET 325 MG: 325 PILL ORAL at 18:31

## 2025-01-16 RX ADMIN — IOPAMIDOL 67 ML: 755 INJECTION, SOLUTION INTRAVENOUS at 13:57

## 2025-01-16 ASSESSMENT — COLUMBIA-SUICIDE SEVERITY RATING SCALE - C-SSRS
6. HAVE YOU EVER DONE ANYTHING, STARTED TO DO ANYTHING, OR PREPARED TO DO ANYTHING TO END YOUR LIFE?: NO
1. IN THE PAST MONTH, HAVE YOU WISHED YOU WERE DEAD OR WISHED YOU COULD GO TO SLEEP AND NOT WAKE UP?: NO
2. HAVE YOU ACTUALLY HAD ANY THOUGHTS OF KILLING YOURSELF IN THE PAST MONTH?: NO

## 2025-01-16 ASSESSMENT — ACTIVITIES OF DAILY LIVING (ADL)
ADLS_ACUITY_SCORE: 22
ADLS_ACUITY_SCORE: 41
ADLS_ACUITY_SCORE: 22
ADLS_ACUITY_SCORE: 41

## 2025-01-16 NOTE — ED NOTES
"[facility]  ED Nurse Handoff Report    ED Chief complaint: Loss of Vision  . ED Diagnosis:   Final diagnoses:   None       Allergies: No Known Allergies    Code Status: Full Code    Activity level - Baseline/Home:  independent.  Activity Level - Current:   independent.   Lift room needed: No.   Bariatric: No   Needed: No   Isolation: No.   Infection: Not Applicable.     Respiratory status: Room air    Vital Signs (within 30 minutes):   Vitals:    01/16/25 1234 01/16/25 1528 01/16/25 1534   BP: (!) 177/104  (!) 158/101   Pulse: 78  69   Resp: 18     Temp: 97.2  F (36.2  C)     TempSrc: Temporal     SpO2: 100%  99%   Weight: 90.1 kg (198 lb 10.2 oz)     Height:  1.778 m (5' 10\")        Cardiac Rhythm:  ,      Pain level:    Patient confused: No.   Patient Falls Risk: patient and family education.   Elimination Status: Has voided     Patient Report - Initial Complaint: Loss of vision.   Focused Assessment: Ludwin Massey is a 34 year old male with a history of ASD, ocular migraines, and hypertension, who presents to the emergency department today for evaluation of loss of vision in his left eye. The patient reports going skiing on Sunday when he began to experience a loss of vision in his left eye. Ludwin initially thought this was just part of his migraines, where he does occasionally lose vision in one or both eyes temporarily. However, the dull headache persisted, and was accompanied by a blind spot in the the upper right quadrant of his left eye. This blind spot is typically gray but has changed colors. Ludwin took ibuprofen and Tylenol for his headache but the headache did not subside. This morning the patient went to his eye doctor, where they did retinal scans and concluded he needed to go to the ED for a retinal stroke. Ludwin notes a pain in his left leg that began on Sunday as well, but this pain goes away with walking. Ludwin does have high blood pressure, but it has been under " control for the past several years, with his baseline around 125/80. He does not endorse any asthma, chest pain,  or breathing problems. Of note Ludwin does have a familial history of blood clots, with both parents experiencing DVT.         Hx of ocular migraines.   The patient reports visual disturbance in his left eye since he was skiing in [place] 3 days ago. Ludwin states he had a migraine with aura that never resolved. He describes it as a gray spot in his left eye towards the midline. He notes a dull headache that has not resolved despite taking ibuprofen and Tylenol for the past 4 days. Patient was seen at the eye doctor earlier today and found to have retinal ischemic stroke and sent to the ED. Additionally, patient reports posterior knee soreness. He has not been checking his blood pressures regularly and states he generally runs around 125/80. He endorses a family hx of PE/DVT. Denies cp, sob, hx of diabetes, asthma, or cardiac or lung problems. He Ludwin does not have an established PCP but follows with Cardiology every 2 years.      Abnormal Results:   Labs Ordered and Resulted from Time of ED Arrival to Time of ED Departure   BASIC METABOLIC PANEL - Abnormal       Result Value    Sodium 139      Potassium 3.8      Chloride 100      Carbon Dioxide (CO2) 31 (*)     Anion Gap 8      Urea Nitrogen 10.6      Creatinine 0.98      GFR Estimate >90      Calcium 10.3      Glucose 116 (*)    HEPATIC FUNCTION PANEL - Normal    Protein Total 7.5      Albumin 4.9      Bilirubin Total 0.2      Alkaline Phosphatase 58      AST 34      ALT 29      Bilirubin Direct <0.20     INR - Normal    INR 0.92     TROPONIN T, HIGH SENSITIVITY - Normal    Troponin T, High Sensitivity 11     TROPONIN T, HIGH SENSITIVITY - Normal    Troponin T, High Sensitivity 11     CBC WITH PLATELETS AND DIFFERENTIAL    WBC Count 7.1      RBC Count 4.91      Hemoglobin 15.6      Hematocrit 45.5      MCV 93      MCH 31.8      MCHC 34.3      RDW 11.8       Platelet Count 212      % Neutrophils 71      % Lymphocytes 21      % Monocytes 6      % Eosinophils 1      % Basophils 1      % Immature Granulocytes 1      NRBCs per 100 WBC 0      Absolute Neutrophils 5.0      Absolute Lymphocytes 1.5      Absolute Monocytes 0.4      Absolute Eosinophils 0.1      Absolute Basophils 0.1      Absolute Immature Granulocytes 0.0      Absolute NRBCs 0.0          US Lower Extremity Venous Duplex Left   Final Result   IMPRESSION:   1.  No deep venous thrombosis in the left lower extremity.      MR Brain w/o & w Contrast   Final Result   IMPRESSION:   1.   No acute intracranial abnormality. No discernible left orbital abnormality; however, recommend MRI orbits if there is concern for orbital or optic nerve pathology.   2.  Scattered nonspecific foci of signal abnormality throughout the cerebral white matter which may be seen in the setting of migraines.      CTA Head Neck with Contrast   Final Result   CONCLUSION:   HEAD CT:   1.  No intracranial hemorrhage, mass, or definite CT evidence of recent ischemia.   2.  Tiny chronic appearing infarct lateral left cerebellum.      HEAD CTA:   1.  Negative. No vessel stenosis, occlusion or aneurysm.      NECK CTA:   1.  Negative. No dissection or hemodynamically significant narrowing in the neck by NASCET criteria.            Head CT w/o contrast   Final Result   CONCLUSION:   HEAD CT:   1.  No intracranial hemorrhage, mass, or definite CT evidence of recent ischemia.   2.  Tiny chronic appearing infarct lateral left cerebellum.      HEAD CTA:   1.  Negative. No vessel stenosis, occlusion or aneurysm.      NECK CTA:   1.  Negative. No dissection or hemodynamically significant narrowing in the neck by NASCET criteria.                Treatments provided: Pain management  Family Comments: Wife at bedside  OBS brochure/video discussed/provided to patient:  No  ED Medications:   Medications   CT SCAN FLUSH (80 mLs Intravenous $Given 1/16/25 3147)    iopamidol (ISOVUE-370) solution 500 mL (67 mLs Intravenous $Given 1/16/25 9607)   gadobutrol (GADAVIST) injection 9 mL (9 mLs Intravenous $Given 1/16/25 2187)   ketorolac (TORADOL) injection 15 mg (15 mg Intravenous $Given 1/16/25 1713)       Drips infusing:  No  For the majority of the shift this patient was Green.   Interventions performed were N/A.    Sepsis treatment initiated: No    Cares/treatment/interventions/medications to be completed following ED care: Per MAR and orders    ED Nurse Name: Marixa Rojas RN  5:28 PM  RECEIVING UNIT ED HANDOFF REVIEW    Above ED Nurse Handoff Report was reviewed: Yes  Reviewed by: Kari Hadley RN on January 16, 2025 at 8:12 PM   LIANG Burkett called the ED to inform them the note was read: Yes

## 2025-01-16 NOTE — CONSULTS
"  [facility]    Stroke Telephone Note    I was called by Alfredo Perez on 01/16/25 regarding patient Ludwin Massey. The patient is a 34 year old male with past medical history of atrial septal defect, hypertension presented because of loss of vision of the left eye for the past 3 days.  Went ophthalmology clinic and confirmed with a diagnosis of branch retinal artery occlusion so he was prompted to the ED for further management.  CTA and MRI are all unremarkable.  Hx of ASD.     Vitals  BP: (!) 158/101   Pulse: 69   Resp: 18   Temp: 97.2  F (36.2  C)   Weight: 90.1 kg (198 lb 10.2 oz)    Imaging Findings  CT head: Unremarkable  CTA head/neck: No large vessel occlusion, no stenosis of the left ICA  MRI of the brain: Unremarkable    Impression  Left branch retinal artery occlusion (cardioembolic?). Lower extremity U/S negative for DVT  Atrial septal defect    Recommendations  - Use orderset: \"Ischemic Stroke Routine Admission\" or \"Ischemic Stroke No Thrombolytics/No Thrombectomy ICU Admission\"  - Place Neurology IP Stroke Consult order   - Neurochecks and Vital Signs every 4 hours   - Load with aspirin 324 mg x 1   - Daily aspirin 81 mg for secondary stroke prevention  - Plavix (clopidogrel) 300 mg PO loading dose x 1  - Plavix (clopidogrel) 75 mg PO Daily  - Statin: after lipid profile  - TTE (with Bubble Study if age 60 yrs or less). Consider GERMÁN if TTE unrevealing.  - For atrial septal defect, consult cardiology at some point  - Telemetry, EKG  - Bedside Glucose Monitoring  - A1c, Lipid Panel, Troponin x 3  - PT/OT/SLP  - Stroke Education  - Euthermia, Euglycemia    My recommendations are based on the information provided over the phone by Ludwin Massey's in-person providers. They are not intended to replace the clinical judgment of his in-person providers. I was not requested to personally see or examine the patient at this time.     The Stroke Staff is Dr. Garnett.    Maria T Beauchamp, " "MD  Vascular Neurology Fellow    To page me or covering stroke neurology team member, click here: AMCOM  Choose \"On Call\" tab at top, then select \"NEUROLOGY/ALL SITES\" from middle drop-down box, press Enter, then look for \"stroke\" or \"telestroke\" for your site.    "

## 2025-01-16 NOTE — ED TRIAGE NOTES
"Pt reports that he has had a \"black spot\" in his vision for the last 3 days, Pt reports that he went to the ophthalmology appt today and they  did scans per pt. Per pt they noted an \"eye stroke that is ischemic\" Pt sent here for futher eval. Pt reports dull headache at this time. No other sx's at this time. VSS          "

## 2025-01-16 NOTE — ED PROVIDER NOTES
Emergency Department Note      History of Present Illness     Chief Complaint   Loss of Vision      HPI   Ludwin Massey is a 34 year old male with a history of ASD s/p repair, ocular migraines, and hypertension, who presents to the emergency department today for evaluation of loss of vision in his left eye. The patient reports going skiing on 4 days ago (1/12/25) when he began to experience a loss of vision in his left eye. Ludwin initially thought this was due to his migraines, where he does occasionally lose vision in one or both eyes temporarily. However, the dull headache persisted and aura, and was accompanied by a blind spot in the the upper right quadrant of his left eye. This blind spot is typically gray but has changed colors. Ludwin took ibuprofen and Tylenol for his headache with no relief. This morning, the patient went to his eye doctor, where they did retinal scans and referred him to the ED for a retinal stroke. Additionally, Ludwin notes a pain in his left leg that began 4 days ago as well, but this pain resolves with walking. Ludwin does have hypertension, but it has been under control for the past several years, with his baseline around 125/80. He denies chest pain, shortness of breath, history of asthma, diabetes, or cardiac or lung problems. Of note, Ludwin does have a family history of PE/DVT in both parents. Ludwin does not have an established PCP but follows with Cardiology every 2 years.     Independent Historian   None    Review of External Notes   I reviewed Dr. Beto Gomez O.D.'s [facility] care paperwork regarding ischemia in the inferior retina.    Past Medical History     Medical History and Problem List   Atrial septal defect  Hypertension  Congenital cleft leaflet of mitral valve    Medications   Hydrochlorothiazide  Metoprolol succinate    Surgical History   Vasectomy   Ostium primum s/p repair    Physical Exam     Patient Vitals for the past 24 hrs:   BP Temp  "Temp src Pulse Resp SpO2 Height Weight   01/16/25 2059 (!) 133/90 -- -- 62 16 96 % -- --   01/16/25 1956 138/73 98.5  F (36.9  C) Oral 71 18 96 % -- --   01/16/25 1839 -- -- -- 73 -- 97 % -- --   01/16/25 1534 (!) 158/101 -- -- 69 -- 99 % -- --   01/16/25 1528 -- -- -- -- -- -- 1.778 m (5' 10\") --   01/16/25 1234 (!) 177/104 97.2  F (36.2  C) Temporal 78 18 100 % -- 90.1 kg (198 lb 10.2 oz)     Physical Exam  Constitutional:       Appearance: He is well-developed.   HENT:      Right Ear: Tympanic membrane and external ear normal.      Left Ear: Tympanic membrane and external ear normal.      Mouth/Throat:      Mouth: Mucous membranes are moist.      Pharynx: Oropharynx is clear. No oropharyngeal exudate or posterior oropharyngeal erythema.   Eyes:      General: No scleral icterus.     Extraocular Movements: Extraocular movements intact.      Conjunctiva/sclera: Conjunctivae normal.      Pupils: Pupils are equal, round, and reactive to light.   Cardiovascular:      Rate and Rhythm: Normal rate and regular rhythm.      Heart sounds: Normal heart sounds. No murmur heard.     No friction rub. No gallop.   Pulmonary:      Effort: Pulmonary effort is normal. No respiratory distress.      Breath sounds: Normal breath sounds. No stridor. No wheezing, rhonchi or rales.   Abdominal:      General: Bowel sounds are normal. There is no distension.      Palpations: Abdomen is soft. There is no mass.      Tenderness: There is no abdominal tenderness.   Musculoskeletal:         General: Normal range of motion.      Cervical back: Normal range of motion and neck supple.   Skin:     General: Skin is warm and dry.      Capillary Refill: Capillary refill takes less than 2 seconds.      Findings: No rash.   Neurological:      General: No focal deficit present.      Mental Status: He is alert.      Cranial Nerves: No cranial nerve deficit.      Sensory: No sensory deficit.      Motor: No weakness.      Gait: Gait normal. "           Diagnostics     Lab Results   Labs Ordered and Resulted from Time of ED Arrival to Time of ED Departure   BASIC METABOLIC PANEL - Abnormal       Result Value    Sodium 139      Potassium 3.8      Chloride 100      Carbon Dioxide (CO2) 31 (*)     Anion Gap 8      Urea Nitrogen 10.6      Creatinine 0.98      GFR Estimate >90      Calcium 10.3      Glucose 116 (*)    HEPATIC FUNCTION PANEL - Normal    Protein Total 7.5      Albumin 4.9      Bilirubin Total 0.2      Alkaline Phosphatase 58      AST 34      ALT 29      Bilirubin Direct <0.20     INR - Normal    INR 0.92     TROPONIN T, HIGH SENSITIVITY - Normal    Troponin T, High Sensitivity 11     TROPONIN T, HIGH SENSITIVITY - Normal    Troponin T, High Sensitivity 11     CBC WITH PLATELETS AND DIFFERENTIAL    WBC Count 7.1      RBC Count 4.91      Hemoglobin 15.6      Hematocrit 45.5      MCV 93      MCH 31.8      MCHC 34.3      RDW 11.8      Platelet Count 212      % Neutrophils 71      % Lymphocytes 21      % Monocytes 6      % Eosinophils 1      % Basophils 1      % Immature Granulocytes 1      NRBCs per 100 WBC 0      Absolute Neutrophils 5.0      Absolute Lymphocytes 1.5      Absolute Monocytes 0.4      Absolute Eosinophils 0.1      Absolute Basophils 0.1      Absolute Immature Granulocytes 0.0      Absolute NRBCs 0.0         Imaging   US Lower Extremity Venous Duplex Left   Final Result   IMPRESSION:   1.  No deep venous thrombosis in the left lower extremity.      MR Brain w/o & w Contrast   Final Result   IMPRESSION:   1.   No acute intracranial abnormality. No discernible left orbital abnormality; however, recommend MRI orbits if there is concern for orbital or optic nerve pathology.   2.  Scattered nonspecific foci of signal abnormality throughout the cerebral white matter which may be seen in the setting of migraines.      CTA Head Neck with Contrast   Final Result   CONCLUSION:   HEAD CT:   1.  No intracranial hemorrhage, mass, or definite CT  evidence of recent ischemia.   2.  Tiny chronic appearing infarct lateral left cerebellum.      HEAD CTA:   1.  Negative. No vessel stenosis, occlusion or aneurysm.      NECK CTA:   1.  Negative. No dissection or hemodynamically significant narrowing in the neck by NASCET criteria.            Head CT w/o contrast   Final Result   CONCLUSION:   HEAD CT:   1.  No intracranial hemorrhage, mass, or definite CT evidence of recent ischemia.   2.  Tiny chronic appearing infarct lateral left cerebellum.      HEAD CTA:   1.  Negative. No vessel stenosis, occlusion or aneurysm.      NECK CTA:   1.  Negative. No dissection or hemodynamically significant narrowing in the neck by NASCET criteria.            Echocardiogram Complete w Bubble Study - For age < 60 yrs    (Results Pending)       EKG   ECG taken at 1428, ECG read at 1431  Normal sinus rhythm  Incomplete right bundle branch block   Rate 66 bpm. SD interval 200 ms. QRS duration 102 ms. QT/QTc 442/463 ms. P-R-T axes 18 24 55.    Independent Interpretation   CT Head: No intracranial hemorrhage or midline shift.    ED Course      Medications Administered   Medications   metoprolol succinate ER (TOPROL XL) 24 hr tablet 25 mg (has no administration in time range)   lidocaine 1 % 0.1-1 mL (has no administration in time range)   lidocaine (LMX4) cream (has no administration in time range)   sodium chloride (PF) 0.9% PF flush 3 mL (has no administration in time range)   sodium chloride (PF) 0.9% PF flush 3 mL (has no administration in time range)   aspirin EC tablet 81 mg (has no administration in time range)     Or   aspirin (ASA) chewable tablet 81 mg (has no administration in time range)   clopidogrel (PLAVIX) tablet 75 mg (has no administration in time range)   ondansetron (ZOFRAN ODT) ODT tab 4 mg (has no administration in time range)     Or   ondansetron (ZOFRAN) injection 4 mg (has no administration in time range)   acetaminophen (TYLENOL) tablet 650 mg (has no  administration in time range)     Or   acetaminophen (TYLENOL) oral liquid 650 mg (has no administration in time range)     Or   acetaminophen (TYLENOL) Suppository 650 mg (has no administration in time range)   CT SCAN FLUSH (80 mLs Intravenous $Given 1/16/25 1357)   iopamidol (ISOVUE-370) solution 500 mL (67 mLs Intravenous $Given 1/16/25 1357)   gadobutrol (GADAVIST) injection 9 mL (9 mLs Intravenous $Given 1/16/25 1557)   ketorolac (TORADOL) injection 15 mg (15 mg Intravenous $Given 1/16/25 1713)   aspirin (ASA) tablet 325 mg (325 mg Oral $Given 1/16/25 1831)   clopidogrel (PLAVIX) tablet 300 mg (300 mg Oral $Given 1/16/25 1831)       Procedures   Procedures     Discussion of Management   Stroke Neurology, DEBBIE Burns CNP and Dr. Beauchamp    ED Course   ED Course as of 01/16/25 2125   Thu Jan 16, 2025   1415 I obtained history and examined the patient as noted above.     1706 I rechecked the patient and explained findings. He is still having a dull headache, no new vision changes.    1738 I spoke with TIM Burns CNP, Stroke Neurology, regarding the patient's history and presentation in the emergency department today.     1757 I spoke with Dr. Beauchamp, Stroke Neurology, regarding the patient's history and presentation in the emergency department today.     1816 I spoke with Loni Gupta PA-C for hospitalist, Dr. Akbar) regarding the patient's history and presentation in the emergency department today. Dr. Akbar) accepted the patient for admission.          Additional Documentation  None    Medical Decision Making / Diagnosis     CMS Diagnoses: None    MIPS       None    [facility]   Ludwin Massey is a 34 year old male who presented to ER with concern for branch retinal artery ischemia causing his visual defect.  Patient was sent by his ophthalmologist.  We then did a stroke workup.  Please see the above findings.  Blood pressure was initially elevated although that this seemed to trend down on his own.  He  denies any chest pain shortness of breath.  I do not hear a murmur on exam.  I discussed the finding with stroke neurology team.  They recommend the aspirin and Plavix loading.  Patient will need to be admitted for stroke workup.  He is in agreement with the plan.  Patient admitted to the hospital service.    Disposition   The patient was admitted to the hospital.     Diagnosis     ICD-10-CM    1. Branch retinal artery occlusion of left eye  H34.232       2. Primary hypertension  I10            Scribe Disclosure:  I, Fredy David, am serving as a scribe at 5:01 PM on 1/16/2025 to document services personally performed by Alfredo Perez MD based on my observations and the provider's statements to me.     Scribe Disclosure:  I, Camryn Mathews, am serving as a scribe  for Fredy David, at 5:34 PM on 1/16/2025 to document services personally performed by Alfredo Perez MD based on my observations and the provider's statements to me.        Alfredo Perez MD  01/16/25 2126

## 2025-01-17 ENCOUNTER — APPOINTMENT (OUTPATIENT)
Dept: OCCUPATIONAL THERAPY | Facility: CLINIC | Age: 35
End: 2025-01-17
Attending: PHYSICIAN ASSISTANT
Payer: COMMERCIAL

## 2025-01-17 ENCOUNTER — APPOINTMENT (OUTPATIENT)
Dept: CARDIOLOGY | Facility: CLINIC | Age: 35
End: 2025-01-17
Attending: PHYSICIAN ASSISTANT
Payer: COMMERCIAL

## 2025-01-17 ENCOUNTER — APPOINTMENT (OUTPATIENT)
Dept: ULTRASOUND IMAGING | Facility: CLINIC | Age: 35
End: 2025-01-17
Attending: NURSE PRACTITIONER
Payer: COMMERCIAL

## 2025-01-17 ENCOUNTER — APPOINTMENT (OUTPATIENT)
Dept: CT IMAGING | Facility: CLINIC | Age: 35
End: 2025-01-17
Attending: NURSE PRACTITIONER
Payer: COMMERCIAL

## 2025-01-17 VITALS
SYSTOLIC BLOOD PRESSURE: 138 MMHG | RESPIRATION RATE: 18 BRPM | HEIGHT: 70 IN | OXYGEN SATURATION: 97 % | WEIGHT: 186.7 LBS | DIASTOLIC BLOOD PRESSURE: 80 MMHG | HEART RATE: 80 BPM | TEMPERATURE: 98 F | BODY MASS INDEX: 26.73 KG/M2

## 2025-01-17 LAB
ANION GAP SERPL CALCULATED.3IONS-SCNC: 7 MMOL/L (ref 7–15)
ATRIAL RATE - MUSE: 66 BPM
B2 GLYCOPROT1 IGG SERPL IA-ACNC: <0.8 U/ML
B2 GLYCOPROT1 IGM SERPL IA-ACNC: <2.4 U/ML
BUN SERPL-MCNC: 13.4 MG/DL (ref 6–20)
CALCIUM SERPL-MCNC: 9.3 MG/DL (ref 8.8–10.4)
CARDIOLIPIN IGG SER IA-ACNC: <2 GPL-U/ML
CARDIOLIPIN IGG SER IA-ACNC: NEGATIVE
CARDIOLIPIN IGM SER IA-ACNC: 8.6 MPL-U/ML
CARDIOLIPIN IGM SER IA-ACNC: NEGATIVE
CHLORIDE SERPL-SCNC: 101 MMOL/L (ref 98–107)
CHOLEST SERPL-MCNC: 184 MG/DL
CREAT SERPL-MCNC: 1.05 MG/DL (ref 0.67–1.17)
CRP SERPL-MCNC: <3 MG/L
DIASTOLIC BLOOD PRESSURE - MUSE: NORMAL MMHG
EGFRCR SERPLBLD CKD-EPI 2021: >90 ML/MIN/1.73M2
ERYTHROCYTE [DISTWIDTH] IN BLOOD BY AUTOMATED COUNT: 11.9 % (ref 10–15)
ERYTHROCYTE [SEDIMENTATION RATE] IN BLOOD BY WESTERGREN METHOD: 6 MM/HR (ref 0–15)
FACTOR 2 INTERPRETATION: NORMAL
FACTOR V INTERPRETATION: NORMAL
GLUCOSE SERPL-MCNC: 95 MG/DL (ref 70–99)
HCO3 SERPL-SCNC: 31 MMOL/L (ref 22–29)
HCT VFR BLD AUTO: 45.6 % (ref 40–53)
HDLC SERPL-MCNC: 54 MG/DL
HGB BLD-MCNC: 15.3 G/DL (ref 13.3–17.7)
INTERPRETATION ECG - MUSE: NORMAL
LAB DIRECTOR COMMENTS: NORMAL
LAB DIRECTOR DISCLAIMER: NORMAL
LAB DIRECTOR INTERPRETATION: NORMAL
LAB DIRECTOR METHODOLOGY: NORMAL
LAB DIRECTOR RESULTS: NORMAL
LDLC SERPL CALC-MCNC: 104 MG/DL
LOCATION OF TASK: NORMAL
MCH RBC QN AUTO: 31.5 PG (ref 26.5–33)
MCHC RBC AUTO-ENTMCNC: 33.6 G/DL (ref 31.5–36.5)
MCV RBC AUTO: 94 FL (ref 78–100)
NONHDLC SERPL-MCNC: 130 MG/DL
P AXIS - MUSE: 18 DEGREES
PLATELET # BLD AUTO: 172 10E3/UL (ref 150–450)
POTASSIUM SERPL-SCNC: 4 MMOL/L (ref 3.4–5.3)
PR INTERVAL - MUSE: 200 MS
QRS DURATION - MUSE: 102 MS
QT - MUSE: 442 MS
QTC - MUSE: 463 MS
R AXIS - MUSE: 24 DEGREES
RBC # BLD AUTO: 4.85 10E6/UL (ref 4.4–5.9)
SODIUM SERPL-SCNC: 139 MMOL/L (ref 135–145)
SPECIMEN TYPE: NORMAL
SYSTOLIC BLOOD PRESSURE - MUSE: NORMAL MMHG
T AXIS - MUSE: 55 DEGREES
TRIGL SERPL-MCNC: 131 MG/DL
VENTRICULAR RATE- MUSE: 66 BPM
WBC # BLD AUTO: 6.1 10E3/UL (ref 4–11)

## 2025-01-17 PROCEDURE — 85652 RBC SED RATE AUTOMATED: CPT | Performed by: NURSE PRACTITIONER

## 2025-01-17 PROCEDURE — G0378 HOSPITAL OBSERVATION PER HR: HCPCS

## 2025-01-17 PROCEDURE — 93306 TTE W/DOPPLER COMPLETE: CPT | Mod: 26 | Performed by: INTERNAL MEDICINE

## 2025-01-17 PROCEDURE — 86146 BETA-2 GLYCOPROTEIN ANTIBODY: CPT | Performed by: NURSE PRACTITIONER

## 2025-01-17 PROCEDURE — 86140 C-REACTIVE PROTEIN: CPT | Performed by: NURSE PRACTITIONER

## 2025-01-17 PROCEDURE — 85048 AUTOMATED LEUKOCYTE COUNT: CPT | Performed by: PHYSICIAN ASSISTANT

## 2025-01-17 PROCEDURE — 250N000011 HC RX IP 250 OP 636: Performed by: NURSE PRACTITIONER

## 2025-01-17 PROCEDURE — 36415 COLL VENOUS BLD VENIPUNCTURE: CPT | Performed by: NURSE PRACTITIONER

## 2025-01-17 PROCEDURE — 99239 HOSP IP/OBS DSCHRG MGMT >30: CPT | Performed by: PHYSICIAN ASSISTANT

## 2025-01-17 PROCEDURE — 85303 CLOT INHIBIT PROT C ACTIVITY: CPT | Performed by: NURSE PRACTITIONER

## 2025-01-17 PROCEDURE — 250N000011 HC RX IP 250 OP 636: Performed by: PHYSICIAN ASSISTANT

## 2025-01-17 PROCEDURE — 85390 FIBRINOLYSINS SCREEN I&R: CPT | Mod: 26 | Performed by: PATHOLOGY

## 2025-01-17 PROCEDURE — G0452 MOLECULAR PATHOLOGY INTERPR: HCPCS | Mod: 26 | Performed by: PATHOLOGY

## 2025-01-17 PROCEDURE — 93272 ECG/REVIEW INTERPRET ONLY: CPT | Performed by: INTERNAL MEDICINE

## 2025-01-17 PROCEDURE — 93971 EXTREMITY STUDY: CPT | Mod: RT

## 2025-01-17 PROCEDURE — 250N000009 HC RX 250: Performed by: NURSE PRACTITIONER

## 2025-01-17 PROCEDURE — 85730 THROMBOPLASTIN TIME PARTIAL: CPT | Performed by: NURSE PRACTITIONER

## 2025-01-17 PROCEDURE — 36415 COLL VENOUS BLD VENIPUNCTURE: CPT | Performed by: PHYSICIAN ASSISTANT

## 2025-01-17 PROCEDURE — 258N000001 HC RX 258: Performed by: PHYSICIAN ASSISTANT

## 2025-01-17 PROCEDURE — 82565 ASSAY OF CREATININE: CPT | Performed by: PHYSICIAN ASSISTANT

## 2025-01-17 PROCEDURE — G0427 INPT/ED TELECONSULT70: HCPCS | Mod: G0 | Performed by: NURSE PRACTITIONER

## 2025-01-17 PROCEDURE — 999N000208 ECHOCARDIOGRAM COMPLETE

## 2025-01-17 PROCEDURE — 96376 TX/PRO/DX INJ SAME DRUG ADON: CPT

## 2025-01-17 PROCEDURE — 85306 CLOT INHIBIT PROT S FREE: CPT | Performed by: NURSE PRACTITIONER

## 2025-01-17 PROCEDURE — 74174 CTA ABD&PLVS W/CONTRAST: CPT

## 2025-01-17 PROCEDURE — 93270 REMOTE 30 DAY ECG REV/REPORT: CPT

## 2025-01-17 PROCEDURE — 85014 HEMATOCRIT: CPT | Performed by: PHYSICIAN ASSISTANT

## 2025-01-17 PROCEDURE — 99222 1ST HOSP IP/OBS MODERATE 55: CPT | Performed by: INTERNAL MEDICINE

## 2025-01-17 PROCEDURE — 86147 CARDIOLIPIN ANTIBODY EA IG: CPT | Performed by: NURSE PRACTITIONER

## 2025-01-17 PROCEDURE — 81240 F2 GENE: CPT | Performed by: NURSE PRACTITIONER

## 2025-01-17 PROCEDURE — 82310 ASSAY OF CALCIUM: CPT | Performed by: PHYSICIAN ASSISTANT

## 2025-01-17 PROCEDURE — 97165 OT EVAL LOW COMPLEX 30 MIN: CPT | Mod: GO | Performed by: OCCUPATIONAL THERAPIST

## 2025-01-17 PROCEDURE — 85300 ANTITHROMBIN III ACTIVITY: CPT | Performed by: NURSE PRACTITIONER

## 2025-01-17 PROCEDURE — 80048 BASIC METABOLIC PNL TOTAL CA: CPT | Performed by: PHYSICIAN ASSISTANT

## 2025-01-17 PROCEDURE — 97535 SELF CARE MNGMENT TRAINING: CPT | Mod: GO | Performed by: OCCUPATIONAL THERAPIST

## 2025-01-17 PROCEDURE — 250N000013 HC RX MED GY IP 250 OP 250 PS 637: Performed by: PHYSICIAN ASSISTANT

## 2025-01-17 PROCEDURE — 96375 TX/PRO/DX INJ NEW DRUG ADDON: CPT

## 2025-01-17 RX ORDER — ASPIRIN 81 MG/1
81 TABLET ORAL DAILY
Status: SHIPPED
Start: 2025-01-18

## 2025-01-17 RX ORDER — ACYCLOVIR 200 MG/1
10 CAPSULE ORAL ONCE
Status: COMPLETED | OUTPATIENT
Start: 2025-01-17 | End: 2025-01-17

## 2025-01-17 RX ORDER — IOPAMIDOL 755 MG/ML
500 INJECTION, SOLUTION INTRAVASCULAR ONCE
Status: COMPLETED | OUTPATIENT
Start: 2025-01-17 | End: 2025-01-17

## 2025-01-17 RX ORDER — ACETAMINOPHEN 325 MG/1
975 TABLET ORAL 3 TIMES DAILY
Status: DISCONTINUED | OUTPATIENT
Start: 2025-01-17 | End: 2025-01-17 | Stop reason: HOSPADM

## 2025-01-17 RX ORDER — ACETAMINOPHEN 325 MG/1
325-975 TABLET ORAL EVERY 6 HOURS PRN
Status: SHIPPED
Start: 2025-01-17

## 2025-01-17 RX ORDER — METOCLOPRAMIDE HYDROCHLORIDE 5 MG/ML
10 INJECTION INTRAMUSCULAR; INTRAVENOUS ONCE
Status: COMPLETED | OUTPATIENT
Start: 2025-01-17 | End: 2025-01-17

## 2025-01-17 RX ORDER — METOCLOPRAMIDE 10 MG/1
10 TABLET ORAL 3 TIMES DAILY PRN
Qty: 15 TABLET | Refills: 0 | Status: SHIPPED | OUTPATIENT
Start: 2025-01-17

## 2025-01-17 RX ADMIN — ACETAMINOPHEN 975 MG: 325 TABLET, FILM COATED ORAL at 12:59

## 2025-01-17 RX ADMIN — CLOPIDOGREL BISULFATE 75 MG: 75 TABLET ORAL at 08:08

## 2025-01-17 RX ADMIN — ASPIRIN 81 MG: 81 TABLET, COATED ORAL at 08:07

## 2025-01-17 RX ADMIN — IOPAMIDOL 72 ML: 755 INJECTION, SOLUTION INTRAVENOUS at 16:40

## 2025-01-17 RX ADMIN — SODIUM CHLORIDE 10 ML: 9 INJECTION INTRAMUSCULAR; INTRAVENOUS; SUBCUTANEOUS at 10:40

## 2025-01-17 RX ADMIN — METOPROLOL SUCCINATE 25 MG: 25 TABLET, EXTENDED RELEASE ORAL at 08:08

## 2025-01-17 RX ADMIN — SODIUM CHLORIDE 80 ML: 9 INJECTION, SOLUTION INTRAVENOUS at 16:40

## 2025-01-17 RX ADMIN — METOCLOPRAMIDE HYDROCHLORIDE 10 MG: 5 INJECTION INTRAMUSCULAR; INTRAVENOUS at 12:59

## 2025-01-17 ASSESSMENT — ACTIVITIES OF DAILY LIVING (ADL)
ADLS_ACUITY_SCORE: 19
ADLS_ACUITY_SCORE: 22
ADLS_ACUITY_SCORE: 19
ADLS_ACUITY_SCORE: 19
ADLS_ACUITY_SCORE: 22
ADLS_ACUITY_SCORE: 19

## 2025-01-17 NOTE — ED NOTES
[facility]    ED Boarding Nurse Handoff Addendum Report:    Date/time: 1/16/2025, 9:03 PM    Neuro: Alert and Oriented x4  Activity: are SBA with no assistive devices   Telemetry Monitoring: yes  Pain: complaining of 2/10 pain in their head.  Declines need for intervention.  LDA's: Peripheral  Fluids: is Saline locked.  Diet: Regular  Living Situation:   Consults: OT and Neurology  Discharge Disposition:  TBD    Plan of Care:      Vital signs (within last 30 minutes):    Vitals:    01/16/25 1534 01/16/25 1839 01/16/25 1956 01/16/25 2059   BP: (!) 158/101  138/73 (!) 133/90   BP Location:   Right arm Right arm   Pulse: 69 73 71 62   Resp:   18 16   Temp:   98.5  F (36.9  C)    TempSrc:   Oral    SpO2: 99% 97% 96% 96%   Weight:       Height:           ED Boarding Nurse name: Ely Hill RN

## 2025-01-17 NOTE — PLAN OF CARE
"Goal Outcome Evaluation:  PRIMARY DIAGNOSIS: LOSS OF VISION- LEFT EYE  OUTPATIENT/OBSERVATION GOALS TO BE MET BEFORE DISCHARGE:  ADLs back to baseline: No     Activity and level of assistance: Ambulating independently.     Pain status: Pain free.     Return to near baseline physical activity: Yes             Discharge Planner Nurse  Safe discharge environment identified: Yes  Barriers to discharge: Yes     Alert & oriented x 4. VSS. Independent in the room. Neuros intact except for \"blind spot in RUQ of left eye\", per pt report. Pt reporting mild headache. Echo pending. Stroke neuro ordered several labs, results pending.  "

## 2025-01-17 NOTE — PLAN OF CARE
"Goal Outcome Evaluation:  PRIMARY DIAGNOSIS: LOSS OF VISION- LEFT EYE  OUTPATIENT/OBSERVATION GOALS TO BE MET BEFORE DISCHARGE:  ADLs back to baseline: No    Activity and level of assistance: Ambulating independently.    Pain status: Pain free.    Return to near baseline physical activity: Yes     Discharge Planner Nurse   Safe discharge environment identified: Yes  Barriers to discharge: Yes       Entered by: Kari Hadley RN 01/17/2025 6:14 AM  /80 (BP Location: Right arm, Patient Position: Supine, Cuff Size: Adult Regular)   Pulse 63   Temp 97.6  F (36.4  C) (Oral)   Resp 16   Ht 1.778 m (5' 10\")   Wt 84.7 kg (186 lb 11.2 oz)   SpO2 97%   BMI 26.79 kg/m      Alert & oriented x 4. VSS. Reports a very mild headache 1-2/10.Independent. On Tele  reading 2nd degree type 1 block SR-53. Neuros intact, TTE today. OT, Neurology & SW consults.  Please review provider order for any additional goals.   Nurse to notify provider when observation goals have been met and patient is ready for discharge.      Plan of Care Reviewed With: patient    Problem: Adult Inpatient Plan of Care  Goal: Plan of Care Review  Description: The Plan of Care Review/Shift note should be completed every shift.  The Outcome Evaluation is a brief statement about your assessment that the patient is improving, declining, or no change.  This information will be displayed automatically on your shift  note.  Outcome: Progressing  Flowsheets (Taken 1/17/2025 0612)  Plan of Care Reviewed With: patient  Overall Patient Progress: improving  Goal: Patient-Specific Goal (Individualized)  Description: You can add care plan individualizations to a care plan. Examples of Individualization might be:  \"Parent requests to be called daily at 9am for status\", \"I have a hard time hearing out of my right ear\", or \"Do not touch me to wake me up as it startles  me\".  Outcome: Progressing  Flowsheets  Taken 1/17/2025 0612  Anxieties, Fears or Concerns: " None  Taken 1/16/2025 2127  Anxieties, Fears or Concerns: None  Goal: Absence of Hospital-Acquired Illness or Injury  Outcome: Progressing  Intervention: Identify and Manage Fall Risk  Recent Flowsheet Documentation  Taken 1/16/2025 2130 by Kari Hadley RN  Safety Promotion/Fall Prevention: treat underlying cause  Intervention: Prevent Skin Injury  Recent Flowsheet Documentation  Taken 1/16/2025 2130 by Kari Hadley RN  Body Position: position changed independently  Goal: Optimal Comfort and Wellbeing  Outcome: Progressing  Goal: Readiness for Transition of Care  Outcome: Progressing  Flowsheets (Taken 1/17/2025 0612)  Anticipated Changes Related to Illness: none  Concerns to be Addressed: discharge planning  Intervention: Mutually Develop Transition Plan  Recent Flowsheet Documentation  Taken 1/17/2025 0612 by Kari Hadley RN  Anticipated Changes Related to Illness: none  Concerns to be Addressed: discharge planning  Taken 1/16/2025 2127 by Kari Hadley RN  Equipment Currently Used at Home: none       Overall Patient Progress: improvingOverall Patient Progress: improving

## 2025-01-17 NOTE — PLAN OF CARE
ROOM #204-1    Living Situation (if not independent, order SW consult):Home  Facility name:  : Kari ( Spouse)    Activity level at baseline: Independent  Activity level on admit: Independent    Who will be transporting you at discharge: Kari    Patient registered to observation; given Patient Bill of Rights; given the opportunity to ask questions about observation status and their plan of care.  Patient has been oriented to the observation room, bathroom and call light is in place.    Discussed discharge goals and expectations with patient/family.         Goal Outcome Evaluation:

## 2025-01-17 NOTE — PHARMACY-ADMISSION MEDICATION HISTORY
Pharmacy Intern Admission Medication History    Admission medication history is complete. The information provided in this note is only as accurate as the sources available at the time of the update.    Information Source(s): Patient and CareEverywhere/SureScripts via in-person    Pertinent Information: None    Changes made to PTA medication list:  Added: Ibuprofen, Tylenol  Deleted: None  Changed: None    Allergies reviewed with patient and updates made in EHR: yes    Medication History Completed By: Fredy Chand 1/16/2025 7:18 PM    PTA Med List   Medication Sig Last Dose/Taking    acetaminophen (TYLENOL) 325 MG tablet Take 325-650 mg by mouth every 6 hours as needed for mild pain or headaches. 1/16/2025 Morning    hydrochlorothiazide (HYDRODIURIL) 25 MG tablet Take 1 tablet (25 mg) by mouth daily. 1/16/2025 Morning    ibuprofen (ADVIL/MOTRIN) 200 MG tablet Take 200 mg by mouth every 6 hours as needed for pain. Unknown    metoprolol succinate ER (TOPROL XL) 25 MG 24 hr tablet Take 1 tablet (25 mg) by mouth daily. 1/16/2025 Morning

## 2025-01-17 NOTE — H&P
[facility]    History and Physical - Hospitalist Service       Date of Admission:  1/16/2025    Assessment & Plan      Ludwin Massey is a 34 year old male with history of migraine, repaired atrial septal defect and hypertension admitted on 1/16/2025 with concern of left retinal occlusion causing left eye vision loss.    The ED he was afebrile with heart rate of 78, pressure 177/104 and breathing comfortably on room air without hypoxia.  Lab work showed a normal BMP with the exception of CO2 31, normal LFTs, glucose 116, high-sensitivity troponin of 11 and normal CBC.  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  MRI brain showed no acute abnormality.  Stroke neurology was called recommending loading with Plavix and observation admission for further workup.    # Left retinal occlusion  -Patient developed left eye right upper visual field loss the weekend prior to admission accompanied with mild headache but no other neurological deficits  -Patient went to ophthalmologist today with retinal scan showing occlusion and was sent to the ED  -Here CT and CTA only show a tiny old infarct in the left cerebellum and MRI brain is negative  -Stroke neurology was called recommending admission for stroke workup and starting Plavix with aspirin  -Received loading doses of Plavix and aspirin in the ED  -Continue Plavix 75 mg daily and aspirin 81 mg  -Obtain lipid profile and possibly start statin  -TTE  -Patient has a history of atrial septal defect and neurology note recommends considering cardiology consult.  Can discuss with them tomorrow  -Monitor on telemetry  -Obtain A1c and troponin  -Patient has no physical deficits so will not do PT consult but will order OT consult given vision loss.  No speech deficits will not order speech consult    # Hypertension  - Hold Hydrochlorothiazide but continue with Toprol  "XL    #Hx of Migraine            Diet:  Regular diet   DVT Prophylaxis: Pneumatic Compression Devices  Shah Catheter: Not present  Lines: None     Cardiac Monitoring: None  Code Status:  Full code    Clinically Significant Risk Factors Present on Admission                   # Hypertension: Noted on problem list           # Overweight: Estimated body mass index is 28.5 kg/m  as calculated from the following:    Height as of this encounter: 1.778 m (5' 10\").    Weight as of this encounter: 90.1 kg (198 lb 10.2 oz).              Disposition Plan     Medically Ready for Discharge: Anticipated Tomorrow         The patient's care was discussed with the Patient and ED Consultant(s).    Whitney Gupta PA-C  Hospitalist Service  [facility]  Securely message with [employer] (more info)  Text page via [employer] Paging/Directory     ______________________________________________________________________    Chief Complaint   Left eye vision changes    History is obtained from the patient    History of Present Illness   Ludwin Massey is a 34 year old male who presents from the  with complaint of left eye vision changes.  He was skiing this past weekend and noted that he could not see out of part of his left eye.  He reported that the right upper visual field of his left eye was dark/black and that he could not see in that area.  He has had migraines in the past with vision changes so did not think much of it but over the last couple days it is stayed there without changing.  He has a mild headache but otherwise has no other deficits.  He denies chest pain, shortness of breath, cough, nausea, vomiting, diarrhea, abdominal pain and urinary symptoms.  He does not drink alcohol regularly or smoke cigarettes.      Past Medical History    No past medical history on file.    Past Surgical History   Past Surgical History:   Procedure Laterality Date    VASECTOMY         Prior to Admission Medications "   Prior to Admission Medications   Prescriptions Last Dose Informant Patient Reported? Taking?   acetaminophen (TYLENOL) 325 MG tablet 1/16/2025 Morning  Yes Yes   Sig: Take 325-650 mg by mouth every 6 hours as needed for mild pain or headaches.   hydrochlorothiazide (HYDRODIURIL) 25 MG tablet 1/16/2025 Morning  No Yes   Sig: Take 1 tablet (25 mg) by mouth daily.   ibuprofen (ADVIL/MOTRIN) 200 MG tablet Unknown  Yes Yes   Sig: Take 200 mg by mouth every 6 hours as needed for pain.   metoprolol succinate ER (TOPROL XL) 25 MG 24 hr tablet 1/16/2025 Morning  No Yes   Sig: Take 1 tablet (25 mg) by mouth daily.      Facility-Administered Medications: None           Physical Exam   Vital Signs: Temp: 98.5  F (36.9  C) Temp src: Oral BP: 138/73 Pulse: 71   Resp: 18 SpO2: 96 % O2 Device: None (Room air)    Weight: 198 lbs 10.15 oz        Medical Decision Making             Data

## 2025-01-17 NOTE — PROGRESS NOTES
01/17/25 1334   Appointment Info   Signing Clinician's Name / Credentials (OT) Bassem Bush EdD, OTR/L   Rehab Comments (OT) Initial evaluation   Living Environment   People in Home spouse;child(lauren), dependent  (2 children, ages 2 and 7)   Current Living Arrangements house  (2 story home with upstairs and basement)   Home Accessibility stairs to enter home;stairs within home   Number of Stairs, Main Entrance 2   Number of Stairs, Within Home, Primary greater than 10 stairs  (14)   Stair Railings, Within Home, Primary railings safe and in good condition   Transportation Anticipated family or friend will provide   Living Environment Comments pt works at a [employer] job for [employer].  Works full time   Self-Care   Usual Activity Tolerance good   Current Activity Tolerance good   Equipment Currently Used at Home none   Fall history within last six months yes   Number of times patient has fallen within last six months 1   General Information   Onset of Illness/Injury or Date of Surgery 01/16/25   Referring Physician Whitney Gupta PA-C   Patient/Family Therapy Goal Statement (OT) return to normal routine, follow up on treatment for his vision changes   Additional Occupational Profile Info/Pertinent History of Current Problem Ludwin Massey is a 34 year old male with history of migraine, repaired atrial septal defect and hypertension admitted on 1/16/2025 with concern of left retinal occlusion causing left eye vision loss.     The ED he was afebrile with heart rate of 78, pressure 177/104 and breathing comfortably on room air without hypoxia.  Lab work showed a normal BMP with the exception of CO2 31, normal LFTs, glucose 116, high-sensitivity troponin of 11 and normal CBC.  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  MRI brain showed no acute abnormality.  Stroke neurology was called recommending loading with  Plavix and observation admission for further workup.   Performance Patterns (Routines, Roles, Habits) Pt is active and enjoys downhill skiing.  Manages his home with his spouse, sharing chores 50/50.   Existing Precautions/Restrictions no known precautions/restrictions   General Observations and Info Pt in bed, willing to participate   Cognitive Status Examination   Orientation Status orientation to person, place and time   Cognitive Status Comments no issues noted, pt did well in explaining his episode and current situation   Visual Perception   Visual Impairment/Limitations corrective lenses for reading   Visual Field Deficit homonymous hemianopsia, left  (Pt identifies a thin horizontal line of non-vision going across the center part of his left sided vision.  Objects disappear and then reappear above and below this line.)   Visual Acuity pt feels he may have some vision changes necessitating some correction for distance vision.  Has not had a full vision test for corrective lenses at this time.   Impact of Vision Impairment on Function (Vision) See notes for visual screening activities   Pain Assessment   Patient Currently in Pain No   Range of Motion Comprehensive   General Range of Motion no range of motion deficits identified   Comment, General Range of Motion B UEs   Strength Comprehensive (MMT)   General Manual Muscle Testing (MMT) Assessment no strength deficits identified   Comment, General Manual Muscle Testing (MMT) Assessment B UEs   Coordination   Upper Extremity Coordination No deficits were identified   Bed Mobility   Bed Mobility No deficits identified   Transfers   Transfers sit-stand transfer   Sit-Stand Transfer   Sit-Stand [place] (Transfers) independent   Balance   Balance Assessment no deficits identified   Clinical Impression   Criteria for Skilled Therapeutic Interventions Met (OT) Yes, treatment indicated   OT Diagnosis decreased independence in ADLS and IADLS secondary to a vision  deficit   OT Problem List-Impairments impacting ADL problems related to;vision   Assessment of Occupational Performance 1-3 Performance Deficits   Identified Performance Deficits visual deficits may affect performance on fine motor and computer based tasks related to pt's job due to fatigue and overwork of the eyes.  Pt may also have some deficits or increased risk of falls/injury by losing track of obstacles while skiiing or playing sports with his children.  Will need to be aware of these deficits to be able to compensate for them.   Planned Therapy Interventions (OT) visual perception;home program guidelines   Clinical Decision Making Complexity (OT) problem focused assessment/low complexity   Risk & Benefits of therapy have been explained evaluation/treatment results reviewed;care plan/treatment goals reviewed;patient   OT Total Evaluation Time   OT Eval, Low Complexity Minutes (52876) 15   OT Goals   Therapy Frequency (OT) One time eval and treatment   Interventions   Interventions Quick Adds Self-Care/Home Management   Self-Care/Home Management   Self-Care/Home Mgmt/ADL, Compensatory, Meal Prep Minutes (17232) 25   Symptoms Noted During/After Treatment (Meal Preparation/Planning Training) other (see comments)  (pt complaining of a right sided headache.  Getting medication for it.)   Treatment Detail/Skilled Intervention OT: Pt able to go through tracking, near/far focus, and visual screen without difficulty.  Visual screen included letter cancellation, copying designs, and figure ground tests.  Pt did have some mild differences in peripheral vision, left side not as wide as right.  Provided vision exercises for pt to do at home emphasizing quick change in focus for attention and scanning.  Pt instructed to do these 10 repetitions daily.  Will be following up with neurology for other treatments as apporpriate.  Also discussed home situations that may create issues and explanation of how one's visual fields  work, so pt understood that there is some overlap between the right and left visual fields.  Suggestions for taking breaks, especially at work, to limit overuse and possible visual fatigue and headaches.  Pt verbalized understanding, willingness to work on exercises.   OT Discharge Planning   OT Plan discharge   OT Discharge Recommendation (DC Rec) home;home with assist   OT Rationale for DC Rec Pt demonstrates overall good mobility and strength.  Is aware of his visual deficits and understands exercises to help work at improving skills.  Anticipate he will be able to return home with additional followup with neurology.   Pt denies any issues with driving  after his injury, may benefit from OP OT to look at driving readiness if visual cuts become larger or more debilitating.   OT Brief overview of current status Goals of therapy will be to address safe mobility and ADLS and make recommendations for discharge to the next level of care.  Pt and RN will continue to follow all fall risk precautions as documented by RN staff while hospitalized.   OT Total Distance Amb During Session (feet) 5   Total Session Time   Timed Code Treatment Minutes 25   Total Session Time (sum of timed and untimed services) 40

## 2025-01-17 NOTE — PLAN OF CARE
"Goal Outcome Evaluation:  PRIMARY DIAGNOSIS: LOSS OF VISION- LEFT EYE  OUTPATIENT/OBSERVATION GOALS TO BE MET BEFORE DISCHARGE:  ADLs back to baseline: No     Activity and level of assistance: Ambulating independently.     Pain status: Pain free.     Return to near baseline physical activity: Yes             Discharge Planner Nurse  Safe discharge environment identified: Yes  Barriers to discharge: Yes     Alert & oriented x 4. VSS. Independent in the room. Neuros intact except for \"blind spot in RUQ of left eye\", per pt report. Echo pending. Tele/Stroke consult at 10:30.  "

## 2025-01-17 NOTE — CONSULTS
"[facility]    Stroke Consult Note    Reason for Consult:  \"retinal stroke\"    Chief Complaint: Loss of Vision       HPI  Ludwin Massey is a 34 year old male with PMH of HTN and ASD (astium primum) and cleft mitral valve s/p repair and migraine with visual aura. He presented to the ED 1/16/25 after being diagnosed with \"retinal stroke\" by outpatient eye clinic. Per report, symptoms started 3 days prior. He was loaded with DAPT and admitted for further evaluation.    Outpatient clinic records reviewed drodarwin Valverde, right eye at [facility]--notes indicate he was diagnosed with L BRAO    Today, Ludwin is accompanied by his wife Kari who contributes to review of systems and history.  We began by reviewing his symptoms.  He reports that on 1/12 he awoke feeling normally and drove approximately 1 hour in blizzard condition; at this time his vision seemed completely normally.  He then went skiing; he does note that early in this game he took a fall landing onto his buttocks/lower back but with no head impact.  He developed a mild headache shortly after this fall.  He then skied the remaining of the day; approximately 5-6 hours later he noticed loss of a small amount of vision in the upper inner quadrant of his right eye.  He thinks that the visual disturbance happened suddenly and has been persistent without much change since.  He has continued to have mild headache since this day.  He has not identified any other associated symptoms.    He has prior history of migraine with visual aura.  He reports that his visual aura is quite stereotyped with an area of altered vision that starts in the periphery of 1 eye then will gradually move across his visual field in the opposite direction prior to resolving.  Visual symptoms generally last a total of 20-60 minutes.  Often he will have a mild headache either concurrently or after this visual symptom.  He has never had " "headache develop prior to the visual symptom in the past.  These migraines with visual aura are often triggered by stress and can occur less than once per month, or other times will cluster and he will have multiple in 1 week.  He generally uses OTC analgesics with relief.    He reports that about 2 months ago (November 2024) he had an episode of L facial sensory changes (felt \"tingly and cold\") below his eye. His wife felt he may have also seemed a little disorientated, or perhaps just anxious. He reports the symptoms lasted about 30-40 minutes and then resolved. He does not recall if he had a headache afterwards. He does recall feeling quite tired.    He reports that his ASD was diagnosed and treated around age 13, after he was evaluated for palpitations. He has had some intermittent palpitations since, but has had numerous normal cardiac event monitors. Palpitations improved once he started a beta blocker for blood pressure, but he does still occasionally get them. He reports his BP has historically been well managed, although he has not checked BP regularly at home over the past year.    He reports that he has used 4mg nicotine pouches 7-8 times daily over the past 4-5 years. He has been trying to quit. No cigarette or vaping use. He estimates 7-14 alcoholic drinks per week on average, sometimes slightly more and sometimes less. He denies marijuana or illicit drug use.    He reports family history of blood clots. His mother had a significant PE in her 30s; this was thought to possibly be due to hormonal birth control use. His father had a fatal DVT in his 40s; the etiology for this remains unclear. Both a paternal grandmother and brother have migraine with visual aura. No known family history of stroke.     Finally, we discussed that given his symptoms were present >72 hours before presentation there is not a clear indication for plavix in addition to ASA.     Case also discussed with hospitalist and Dr. Masters " of cardiology service.     Evaluation Summarized    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, subtle chronic white matter changes    Intracranial Vasculature CTA: No LVO or significant stenosis   Cervical Vasculature CTA: No LVO, significant stenosis or dissection      Echocardiogram LV normal in structure, function and size, normal biatrial size, bubble study moderately positive    EKG/Telemetry Sinus rhythm   Other Testing LLE US: no DVT  RLE US: pending    CTV abdomen/pelvis: pending    ESR: 6  CRP: <3.00  Hypercoagulable panel: pending     LDL  1/16/2025: 104 mg/dL   A1C  1/16/2025: 5.2 %   Troponin 1/16/2025: 11 ng/L     Impression  L BRAO. He does have HTN, but this is historically well controlled and no evidence of ipsilateral carotid disease--thus elevated concern for embolic etiology     H/o ASD (astium primum) and cleft mitral valve s/p repair. Bubble study now positive    Episode of L face sensory disturbance + possible disorientation, Nov 2024. Unclear if possible TIA vs migraine with new sensory aura     Recommendations   - Neurochecks and Vital Signs every 4 hours   - Continue ASA 81mg; discussed that given symptom onset >72 hours PTA that there is not strong indication to continue Plavix--cardiology agreeable to single antiplatelet at this time     - hypercoagulable labs, pending   - RLE US, pending  -CTV abdomen/pelvis, pending    -Inpatient BP goal <180 with gradual reduction of BP to normotension; long term outpatient goal BP is <130/80 with tighter control associated with improved vascular outcomes.Ok to restart PTA BP meds   - (goal ); recommend lifestyle modification and recheck in 2-3 months with consideration of statin therapy with LDL remains >100  -Blood glucose monitoring, Hgb A1c 5.2%, (goal <7% for secondary stroke prevention), follow-up with PCP  - Telemetry, EKG  - Bedside Glucose Monitoring  -  "PT/OT/SLP  - Stroke Education  - Euthermia, Euglycemia    Patient Follow-up    - outpatient [facility] next available  - cardiology follow up next available (discussed with Dr. Masters who has graciously offered to help arrange)  - 30 day cardiac event monitor   - Stroke ELOISE follow up ([facility] only) in 4-8 weeks, ordered     Thank you for this consult. We will follow peripherally for results of the RLE US, CTV and hypercoagulable labs and if no concerns then will sign off. Please contact us with any additional questions.      Rica Gifford, DEBBIE CNP  Vascular Neurology    To page me or covering stroke neurology team member, click here: [facility]  Choose \"On Call\" tab at top, then select \"NEUROLOGY/ALL SITES\" from middle drop-down box, press Enter, then look for \"stroke\" or \"telestroke\" for your site.  _____________________________________________________    Clinically Significant Risk Factors Present on Admission                   # Hypertension: Noted on problem list           # Overweight: Estimated body mass index is 26.79 kg/m  as calculated from the following:    Height as of this encounter: 1.778 m (5' 10\").    Weight as of this encounter: 84.7 kg (186 lb 11.2 oz).            Past Medical History    No past medical history on file.  Medications   Home Meds  Prior to Admission medications    Medication Sig Start Date End Date Taking? Authorizing Provider   acetaminophen (TYLENOL) 325 MG tablet Take 325-650 mg by mouth every 6 hours as needed for mild pain or headaches.   Yes Unknown, Entered By History   hydrochlorothiazide (HYDRODIURIL) 25 MG tablet Take 1 tablet (25 mg) by mouth daily. 10/30/24  Yes Rubina Rojo MD   ibuprofen (ADVIL/MOTRIN) 200 MG tablet Take 200 mg by mouth every 6 hours as needed for pain.   Yes Unknown, Entered By History   metoprolol succinate ER (TOPROL XL) 25 MG 24 hr tablet Take 1 tablet (25 mg) by mouth daily. 10/30/24  Yes Rubina Rojo MD       Scheduled Meds  Current " Facility-Administered Medications   Medication Dose Route Frequency Provider Last Rate Last Admin    acetaminophen (TYLENOL) tablet 975 mg  975 mg Oral TID Whitney Gupta PA-C        aspirin EC tablet 81 mg  81 mg Oral Daily Whitney Gupta PA-C   81 mg at 01/17/25 0807    Or    aspirin (ASA) chewable tablet 81 mg  81 mg Oral or NG Tube Daily Whitney Gupta PA-C        clopidogrel (PLAVIX) tablet 75 mg  75 mg Oral or NG Tube Daily Whitney Gupta PA-C   75 mg at 01/17/25 0808    metoclopramide (REGLAN) injection 10 mg  10 mg Intravenous Once Whitney Gupta PA-C        metoprolol succinate ER (TOPROL XL) 24 hr tablet 25 mg  25 mg Oral Daily Whitney Gupta PA-C   25 mg at 01/17/25 0808    sodium chloride (PF) 0.9% PF flush 3 mL  3 mL Intracatheter Q8H Whitney Gupta PA-C           Infusion Meds  Current Facility-Administered Medications   Medication Dose Route Frequency Provider Last Rate Last Admin       Allergies   No Known Allergies       PHYSICAL EXAMINATION   Temp:  [97.6  F (36.4  C)-98.5  F (36.9  C)] 97.6  F (36.4  C)  Pulse:  [53-73] 63  Resp:  [16-18] 16  BP: (116-158)/() 140/85  SpO2:  [96 %-99 %] 97 %    Neuro Exam  Mental Status:  alert, oriented x 3, follows commands, speech clear and fluent, naming and repetition normal  Cranial Nerves: no field cut, but he does not a small area of obscured vision in the R eye only (tested by nurse), EOMI with normal smooth pursuit, facial sensation intact and symmetric (tested by nurse), facial movements symmetric, hearing not formally tested but intact to conversation, no dysarthria, shoulder shrug equal bilaterally, tongue protrusion midline  Motor:  no abnormal movements, able to move all limbs antigravity spontaneously with no signs of hemiparesis observed, no pronator drift, no leg drift  Reflexes:  unable to test (telestroke)  Sensory:  light touch sensation intact and symmetric throughout  upper and lower extremities (assessed by nurse), no extinction on double simultaneous stimulation (assessed by nurse)  Coordination:  normal finger-to-nose and heel-to-shin bilaterally without dysmetria  Station/Gait:  unable to test due to telestroke    Stroke Scales    NIHSS  1a. Level of Consciousness 0-->Alert, keenly responsive   1b. LOC Questions 0-->Answers both questions correctly   1c. LOC Commands 0-->Performs both tasks correctly   2.   Best Gaze 0-->Normal   3.   Visual 1-->Partial hemianopia   4.   Facial Palsy 0-->Normal symmetrical movements   5a. Motor Arm, Left 0-->No drift, limb holds 90 (or 45) degrees for full 10 secs   5b. Motor Arm, Right 0-->No drift, limb holds 90 (or 45) degrees for full 10 secs   6a. Motor Leg, Left 0-->No drift, leg holds 30 degree position for full 5 secs   6b. Motor Leg, right 0-->No drift, leg holds 30 degree position for full 5 secs   7.   Limb Ataxia 0-->Absent   8.   Sensory 0-->Normal, no sensory loss   9.   Best Language 0-->No aphasia, normal   10. Dysarthria 0-->Normal   11. Extinction and Inattention  0-->No abnormality   Total 1 (01/17/25 1013)       Imaging  I personally reviewed all imaging; relevant findings per HPI.    Labs Data   CBC  Recent Labs   Lab 01/17/25  0633 01/16/25  1243   WBC 6.1 7.1   RBC 4.85 4.91   HGB 15.3 15.6   HCT 45.6 45.5    212     Basic Metabolic Panel   Recent Labs   Lab 01/17/25  0633 01/16/25  1243    139   POTASSIUM 4.0 3.8   CHLORIDE 101 100   CO2 31* 31*   BUN 13.4 10.6   CR 1.05 0.98   GLC 95 116*   BHUPENDRA 9.3 10.3     Liver Panel  Recent Labs   Lab 01/16/25  1243   PROTTOTAL 7.5   ALBUMIN 4.9   BILITOTAL 0.2   ALKPHOS 58   AST 34   ALT 29     INR    Recent Labs   Lab Test 01/16/25  1243   INR 0.92           Stroke Consult Data Data   Telestroke Service Details  (for non-emergent stroke consult with tele)  Video start time 01/17/25   1013   Video end time 01/17/25   1113   Type of service telemedicine diagnostic  assessment of acute neurological changes   Reason telemedicine is appropriate patient requires assessment with a specialist for diagnosis and treatment of neurological symptoms   Mode of transmission secure interactive audio and video communication per [place]   Originating site (patient location) [facility]    Distant site (provider location) [facility]       I have personally spent a total of 100 minutes providing care today, time spent in reviewing medical records and devising the plan as recorded above.

## 2025-01-17 NOTE — PROGRESS NOTES
01/17/25 1334   Appointment Info   Signing Clinician's Name / Credentials (OT) Bassem Bush EdD, OTR/L   Rehab Comments (OT) Initial evaluation   Living Environment   People in Home spouse;child(lauren), dependent  (2 children, ages 2 and 7)   Current Living Arrangements house  (2 story home with upstairs and basement)   Home Accessibility stairs to enter home;stairs within home   Number of Stairs, Main Entrance 2   Number of Stairs, Within Home, Primary greater than 10 stairs  (14)   Stair Railings, Within Home, Primary railings safe and in good condition   Transportation Anticipated family or friend will provide   Living Environment Comments pt works at a [employer] job for [employer].  Works full time   Self-Care   Usual Activity Tolerance good   Current Activity Tolerance good   Equipment Currently Used at Home none   Fall history within last six months yes   Number of times patient has fallen within last six months 1   General Information   Onset of Illness/Injury or Date of Surgery 01/16/25   Referring Physician Whitney Gupta PA-C   Patient/Family Therapy Goal Statement (OT) return to normal routine, follow up on treatment for his vision changes   Additional Occupational Profile Info/Pertinent History of Current Problem Ludwin Massey is a 34 year old male with history of migraine, repaired atrial septal defect and hypertension admitted on 1/16/2025 with concern of left retinal occlusion causing left eye vision loss.     The ED he was afebrile with heart rate of 78, pressure 177/104 and breathing comfortably on room air without hypoxia.  Lab work showed a normal BMP with the exception of CO2 31, normal LFTs, glucose 116, high-sensitivity troponin of 11 and normal CBC.  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  MRI brain showed no acute abnormality.  Stroke neurology was called recommending loading with  Plavix and observation admission for further workup.   Performance Patterns (Routines, Roles, Habits) Pt is active and enjoys downhill skiing.  Manages his home with his spouse, sharing chores 50/50.   Existing Precautions/Restrictions no known precautions/restrictions   General Observations and Info Pt in bed, willing to participate   Cognitive Status Examination   Orientation Status orientation to person, place and time   Cognitive Status Comments no issues noted, pt did well in explaining his episode and current situation   Visual Perception   Visual Impairment/Limitations corrective lenses for reading   Visual Field Deficit homonymous hemianopsia, left  (Pt identifies a thin horizontal line of non-vision going across the center part of his left sided vision.  Objects disappear and then reappear above and below this line.)   Visual Acuity pt feels he may have some vision changes necessitating some correction for distance vision.  Has not had a full vision test for corrective lenses at this time.   Impact of Vision Impairment on Function (Vision) See notes for visual screening activities   Pain Assessment   Patient Currently in Pain No   Range of Motion Comprehensive   General Range of Motion no range of motion deficits identified   Comment, General Range of Motion B UEs   Strength Comprehensive (MMT)   General Manual Muscle Testing (MMT) Assessment no strength deficits identified   Comment, General Manual Muscle Testing (MMT) Assessment B UEs   Coordination   Upper Extremity Coordination No deficits were identified   Bed Mobility   Bed Mobility No deficits identified   Transfers   Transfers sit-stand transfer   Sit-Stand Transfer   Sit-Stand [place] (Transfers) independent   Balance   Balance Assessment no deficits identified   Clinical Impression   Criteria for Skilled Therapeutic Interventions Met (OT) Yes, treatment indicated   OT Diagnosis decreased independence in ADLS and IADLS secondary to a vision  deficit   OT Problem List-Impairments impacting ADL problems related to;vision   Assessment of Occupational Performance 1-3 Performance Deficits   Identified Performance Deficits visual deficits may affect performance on fine motor and computer based tasks related to pt's job due to fatigue and overwork of the eyes.  Pt may also have some deficits or increased risk of falls/injury by losing track of obstacles while skiiing or playing sports with his children.  Will need to be aware of these deficits to be able to compensate for them.   Planned Therapy Interventions (OT) visual perception;home program guidelines   Clinical Decision Making Complexity (OT) problem focused assessment/low complexity   Risk & Benefits of therapy have been explained evaluation/treatment results reviewed;care plan/treatment goals reviewed;patient   OT Total Evaluation Time   OT Eval, Low Complexity Minutes (82314) 15   OT Goals   Therapy Frequency (OT) One time eval and treatment   OT Predicted Duration/Target Date for Goal Attainment 01/17/25   OT Goals OT Goal 1;OT Goal 2   OT: Goal 1 Pt will complete visual screening packet successfully.   OT: Goal 2 Pt will identify adaptations he can make to mitigate the effect of vision loss for everyday tasks and leisure occupations.   Interventions   Interventions Quick Adds Self-Care/Home Management   Self-Care/Home Management   Self-Care/Home Mgmt/ADL, Compensatory, Meal Prep Minutes (31840) 25   Symptoms Noted During/After Treatment (Meal Preparation/Planning Training) other (see comments)  (pt complaining of a right sided headache.  Getting medication for it.)   Treatment Detail/Skilled Intervention OT: Pt able to go through tracking, near/far focus, and visual screen without difficulty.  Visual screen included letter cancellation, copying designs, and figure ground tests.  Pt did have some mild differences in peripheral vision, left side not as wide as right.  Provided vision exercises for pt  to do at home emphasizing quick change in focus for attention and scanning.  Pt instructed to do these 10 repetitions daily.  Will be following up with neurology for other treatments as apporpriate.  Also discussed home situations that may create issues and explanation of how one's visual fields work, so pt understood that there is some overlap between the right and left visual fields.  Suggestions for taking breaks, especially at work, to limit overuse and possible visual fatigue and headaches.  Pt verbalized understanding, willingness to work on exercises.   OT Discharge Planning   OT Plan discharge   OT Discharge Recommendation (DC Rec) home;home with assist   OT Rationale for DC Rec Pt demonstrates overall good mobility and strength.  Is aware of his visual deficits and understands exercises to help work at improving skills.  Anticipate he will be able to return home with additional followup with neurology.   Pt denies any issues with driving  after his injury, may benefit from OP OT to look at driving readiness if visual cuts become larger or more debilitating.   OT Brief overview of current status Goals of therapy will be to address safe mobility and ADLS and make recommendations for discharge to the next level of care.  Pt and RN will continue to follow all fall risk precautions as documented by RN staff while hospitalized.   OT Total Distance Amb During Session (feet) 5   Total Session Time   Timed Code Treatment Minutes 25   Total Session Time (sum of timed and untimed services) 40

## 2025-01-17 NOTE — CONSULTS
[facility]    Cardiology Consultation     Date of Admission:  1/16/2025    Assessment & Plan   Ludwin Massey is a 34 year old male who was admitted on 1/16/2025.    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  GERMÁN to be done as outpt, patient will be called on Monday with the time  Follow-up with structural heart doctor with GERMÁN results. (TBD)  Cardionet 30day monitor to look for AF, may need to consider loop recorder even if monitor is negative.  Moderate complexity     Eliana Sheryl Masters DO, MD    Primary Care Physician   Tamiko Flannery    Reason for Consult   Reason for consult: I was asked by David to evaluate this patient for possible embolic retinal artery occlusion.    History of Present Illness   Ludwin Massey is a 34 year old male who presents with  history of migraine, repaired atrial septal defect and hypertension admitted on 1/16/2025 with concern of left retinal occlusion causing left eye vision loss.     The ED he was afebrile with heart rate of 78, pressure 177/104 and breathing comfortably on room air without hypoxia.  Lab work showed a normal BMP with the exception of CO2 31, normal LFTs, glucose 116, high-sensitivity troponin of 11 and normal CBC.  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  MRI brain showed no acute abnormality.  Stroke neurology was called recommending loading with Plavix and observation admission for further workup.  ECG shows a sinus rhythm with diffuse ST and T wave abnormalities and borderline QTc at 463 ms  Echocardiogram shows previous ASD repair with moderately positive bubble study    Past Medical History   No past medical history on file.      Past Surgical History   Past Surgical History:   Procedure Laterality Date    VASECTOMY           Prior to Admission Medications   Prior to Admission Medications   Prescriptions Last  Dose Informant Patient Reported? Taking?   acetaminophen (TYLENOL) 325 MG tablet 1/16/2025 Morning  Yes Yes   Sig: Take 325-650 mg by mouth every 6 hours as needed for mild pain or headaches.   hydrochlorothiazide (HYDRODIURIL) 25 MG tablet 1/16/2025 Morning  No Yes   Sig: Take 1 tablet (25 mg) by mouth daily.   ibuprofen (ADVIL/MOTRIN) 200 MG tablet Unknown  Yes Yes   Sig: Take 200 mg by mouth every 6 hours as needed for pain.   metoprolol succinate ER (TOPROL XL) 25 MG 24 hr tablet 1/16/2025 Morning  No Yes   Sig: Take 1 tablet (25 mg) by mouth daily.      Facility-Administered Medications: None     Current Facility-Administered Medications   Medication Dose Route Frequency Provider Last Rate Last Admin    acetaminophen (TYLENOL) tablet 975 mg  975 mg Oral TID Whitney Gupta PA-C   975 mg at 01/17/25 1259    aspirin EC tablet 81 mg  81 mg Oral Daily Whitney Gupta PA-C   81 mg at 01/17/25 0807    Or    aspirin (ASA) chewable tablet 81 mg  81 mg Oral or NG Tube Daily Whitney Gupta PA-C        lidocaine (LMX4) cream   Topical Q1H PRN Whitney Gupta PA-C        lidocaine 1 % 0.1-1 mL  0.1-1 mL Other Q1H PRN Whitney Gupta PA-C        metoprolol succinate ER (TOPROL XL) 24 hr tablet 25 mg  25 mg Oral Daily Whitney Gupta PA-C   25 mg at 01/17/25 0808    ondansetron (ZOFRAN ODT) ODT tab 4 mg  4 mg Oral Q6H PRN Whitney Gupta PA-C        Or    ondansetron (ZOFRAN) injection 4 mg  4 mg Intravenous Q6H PRN Whitney Gupta PA-C        sodium chloride (PF) 0.9% PF flush 3 mL  3 mL Intracatheter Q8H Whitney Gupta PA-C   3 mL at 01/17/25 1259    sodium chloride (PF) 0.9% PF flush 3 mL  3 mL Intracatheter q1 min prn Whitney Gupta PA-C         Current Facility-Administered Medications   Medication Dose Route Frequency Provider Last Rate Last Admin    acetaminophen (TYLENOL) tablet 975 mg  975 mg Oral TID Whitney Gupta  BHUPENDRA Ohara   975 mg at 01/17/25 1259    aspirin EC tablet 81 mg  81 mg Oral Daily Whiteny Gupta PA-C   81 mg at 01/17/25 0807    Or    aspirin (ASA) chewable tablet 81 mg  81 mg Oral or NG Tube Daily Whitney Gupta PA-C        lidocaine (LMX4) cream   Topical Q1H PRN Whitney Gupta PA-C        lidocaine 1 % 0.1-1 mL  0.1-1 mL Other Q1H PRN Whitney Gupta PA-C        metoprolol succinate ER (TOPROL XL) 24 hr tablet 25 mg  25 mg Oral Daily Whitney Gupta PA-C   25 mg at 01/17/25 0808    ondansetron (ZOFRAN ODT) ODT tab 4 mg  4 mg Oral Q6H PRN Whitney Gupta PA-C        Or    ondansetron (ZOFRAN) injection 4 mg  4 mg Intravenous Q6H PRN Whitney Gupta PA-C        sodium chloride (PF) 0.9% PF flush 3 mL  3 mL Intracatheter Q8H Whitney Gupta PA-C   3 mL at 01/17/25 1259    sodium chloride (PF) 0.9% PF flush 3 mL  3 mL Intracatheter q1 min prn Whitney Gupta PA-C         Allergies   No Known Allergies    Social History    reports that he has never smoked. He has never used smokeless tobacco. He reports current alcohol use. He reports that he does not use drugs.      Family History   I have reviewed this patient's family history and updated it with pertinent information if needed.  Family History   Problem Relation Age of Onset    Hypertension Father     Thrombosis Father 42        DVT    Other - See Comments Father         WPW    Thrombosis Mother     Colon Cancer Maternal Grandfather     Migraines Paternal Grandmother     Dementia Paternal Grandfather     Hypertension Brother     Other - See Comments Paternal Aunt         Mitral valve prolapse          Review of Systems   A comprehensive review of system was performed and is negative other than that noted in the HPI or here.     Physical Exam   Vital Signs with Ranges  Temp:  [97.6  F (36.4  C)-98.5  F (36.9  C)] 97.6  F (36.4  C)  Pulse:  [53-73] 63  Resp:  [16-18] 16  BP:  "(116-140)/(72-90) 140/85  SpO2:  [96 %-98 %] 97 %  Wt Readings from Last 4 Encounters:   01/16/25 84.7 kg (186 lb 11.2 oz)   11/10/23 78.5 kg (173 lb)   10/18/23 79.4 kg (175 lb)   07/13/23 78.7 kg (173 lb 9.6 oz)     No intake/output data recorded.      Vitals: BP (!) 140/85   Pulse 63   Temp 97.6  F (36.4  C) (Oral)   Resp 16   Ht 1.778 m (5' 10\")   Wt 84.7 kg (186 lb 11.2 oz)   SpO2 97%   BMI 26.79 kg/m      Physical Exam:   General - Alert and oriented to time place and person in no acute distress  Eyes - No scleral icterus  HEENT - Neck supple, moist mucous membranes  Cardiovascular - regular no murmur  Extremities - There is no  edema  Respiratory - clear  Skin - No pallor or cyanosis  Gastrointestinal - Non tender Psych - Appropriate affect   Neurological - No gross motor neurological focal deficits    No lab results found in last 7 days.    Invalid input(s): \"TROPONINIES\"    Recent Labs   Lab 01/17/25  0633 01/16/25  1243   WBC 6.1 7.1   HGB 15.3 15.6   MCV 94 93    212   INR  --  0.92    139   POTASSIUM 4.0 3.8   CHLORIDE 101 100   CO2 31* 31*   BUN 13.4 10.6   CR 1.05 0.98   GFRESTIMATED >90 >90   ANIONGAP 7 8   BHUPENDRA 9.3 10.3   GLC 95 116*   ALBUMIN  --  4.9   PROTTOTAL  --  7.5   BILITOTAL  --  0.2   ALKPHOS  --  58   ALT  --  29   AST  --  34     Recent Labs   Lab Test 01/16/25  1528 07/13/23  0819   CHOL 184 147   HDL 54 64   * 70   TRIG 131 65     Recent Labs   Lab 01/17/25  0633 01/16/25  1243   WBC 6.1 7.1   HGB 15.3 15.6   HCT 45.6 45.5   MCV 94 93    212     No results for input(s): \"PH\", \"PHV\", \"PO2\", \"PO2V\", \"SAT\", \"PCO2\", \"PCO2V\", \"HCO3\", \"HCO3V\" in the last 168 hours.  No results for input(s): \"NTBNPI\", \"NTBNP\" in the last 168 hours.  No results for input(s): \"DD\" in the last 168 hours.  Recent Labs   Lab 01/17/25  0633   SED 6     Recent Labs   Lab 01/17/25  0633 01/16/25  1243    212     No results for input(s): \"TSH\" in the last 168 hours.  No results " "for input(s): \"COLOR\", \"APPEARANCE\", \"URINEGLC\", \"URINEBILI\", \"URINEKETONE\", \"SG\", \"UBLD\", \"URINEPH\", \"PROTEIN\", \"UROBILINOGEN\", \"NITRITE\", \"LEUKEST\", \"RBCU\", \"WBCU\" in the last 168 hours.    Imaging:  Recent Results (from the past 48 hours)   Head CT w/o contrast    Narrative    HEAD AND NECK CT ANGIOGRAM WITH IV CONTRAST  [facility]  1/16/2025 2:19 PM CST    INDICATION: Visual disturbance, headache  TECHNIQUE: Head and neck CT angiogram with IV contrast. Noncontrast head CT followed by axial helical CT images of the head and neck vessels obtained during the arterial phase of intravenous contrast administration. Axial helical 2D reconstructed images   and multiplanar 3D MIP reconstructed images of the head and neck vessels were performed by the technologist. Dose reduction techniques were used. Vessel stenoses reported according to NASCET criteria.  CONTRAST: 67mL Isovue 370  COMPARISON: None.    FINDINGS:  NONCONTRAST HEAD CT: No intracranial hemorrhage, extraaxial collection, mass effect or CT evidence of acute infarct.  Tiny chronic appearing infarct lateral left cerebellum. The ventricles and sulci are normal for age. Osseous structures are intact. The   visualized paranasal sinuses are free of significant disease. The mastoid air cells are free of significant disease. The orbits are unremarkable.    HEAD CTA: The intracranial circulation is patent without evidence for aneurysm, proximal vessel occlusion, high-grade intracranial stenosis or arteriovenous malformation.  Patent dural venous sinuses.    NECK CTA: Three vessel arch.  Carotid arteries are patent without atherosclerotic change.  No hemodynamically significant stenosis by NASCET criteria in either carotid system.  Patent vertebral arteries. No dissection.      Impression    CONCLUSION:  HEAD CT:  1.  No intracranial hemorrhage, mass, or definite CT evidence of recent ischemia.  2.  Tiny chronic appearing infarct lateral left " cerebellum.    HEAD CTA:  1.  Negative. No vessel stenosis, occlusion or aneurysm.    NECK CTA:  1.  Negative. No dissection or hemodynamically significant narrowing in the neck by NASCET criteria.       CTA Head Neck with Contrast    Narrative    HEAD AND NECK CT ANGIOGRAM WITH IV CONTRAST  [facility]  1/16/2025 2:19 PM CST    INDICATION: Visual disturbance, headache  TECHNIQUE: Head and neck CT angiogram with IV contrast. Noncontrast head CT followed by axial helical CT images of the head and neck vessels obtained during the arterial phase of intravenous contrast administration. Axial helical 2D reconstructed images   and multiplanar 3D MIP reconstructed images of the head and neck vessels were performed by the technologist. Dose reduction techniques were used. Vessel stenoses reported according to NASCET criteria.  CONTRAST: 67mL Isovue 370  COMPARISON: None.    FINDINGS:  NONCONTRAST HEAD CT: No intracranial hemorrhage, extraaxial collection, mass effect or CT evidence of acute infarct.  Tiny chronic appearing infarct lateral left cerebellum. The ventricles and sulci are normal for age. Osseous structures are intact. The   visualized paranasal sinuses are free of significant disease. The mastoid air cells are free of significant disease. The orbits are unremarkable.    HEAD CTA: The intracranial circulation is patent without evidence for aneurysm, proximal vessel occlusion, high-grade intracranial stenosis or arteriovenous malformation.  Patent dural venous sinuses.    NECK CTA: Three vessel arch.  Carotid arteries are patent without atherosclerotic change.  No hemodynamically significant stenosis by NASCET criteria in either carotid system.  Patent vertebral arteries. No dissection.      Impression    CONCLUSION:  HEAD CT:  1.  No intracranial hemorrhage, mass, or definite CT evidence of recent ischemia.  2.  Tiny chronic appearing infarct lateral left cerebellum.    HEAD CTA:  1.  Negative.  No vessel stenosis, occlusion or aneurysm.    NECK CTA:  1.  Negative. No dissection or hemodynamically significant narrowing in the neck by NASCET criteria.       MR Brain w/o & w Contrast    Narrative    EXAM: MR BRAIN W/O and W CONTRAST  LOCATION: [facility]  DATE: 1/16/2025    INDICATION: reitnal stroke L eye  COMPARISON: Same day CTA head and neck.  CONTRAST: 9 mL Gadavist  TECHNIQUE: Routine multiplanar multisequence head MRI without and with intravenous contrast.    FINDINGS:  INTRACRANIAL CONTENTS: No acute or subacute infarct. No mass, acute hemorrhage, or extra-axial fluid collections. Scattered nonspecific foci of T2/FLAIR hyperintense signal in the cerebral white matter. Normal ventricles and sulci. Normal position of the   cerebellar tonsils. No pathologic contrast enhancement.    SELLA: No abnormality accounting for technique.    OSSEOUS STRUCTURES/SOFT TISSUES: Normal marrow signal. The major intracranial vascular flow voids are maintained.     ORBITS: No abnormality accounting for technique.     SINUSES/MASTOIDS: No paranasal sinus mucosal disease. No middle ear or mastoid effusion.       Impression    IMPRESSION:  1.   No acute intracranial abnormality. No discernible left orbital abnormality; however, recommend MRI orbits if there is concern for orbital or optic nerve pathology.  2.  Scattered nonspecific foci of signal abnormality throughout the cerebral white matter which may be seen in the setting of migraines.   US Lower Extremity Venous Duplex Left    Narrative    EXAM: US LOWER EXTREMITY VENOUS DUPLEX LEFT  LOCATION: [facility]  DATE: 1/16/2025    INDICATION: Left leg pain. Stroke.  COMPARISON: None.  TECHNIQUE: Venous Duplex ultrasound of the left lower extremity with and without compression, augmentation and duplex. Color flow and spectral Doppler with waveform analysis performed.    FINDINGS: Exam includes the common femoral, femoral,  popliteal, and contralateral common femoral veins as well as segmentally visualized deep calf veins and greater saphenous vein.     LEFT: No deep vein thrombosis. No superficial thrombophlebitis. No popliteal cyst.      Impression    IMPRESSION:  1.  No deep venous thrombosis in the left lower extremity.   Echocardiogram Complete w Bubble Study - For age < 60 yrs    Narrative    [id number]  [id number]  [id number]  [id number]^MAUREEN^DALE^TRACI     [facility]  Echocardiography Laboratory  [address] [place] [address]     Name: MEL GREER  MRN: [id number]  : 1990  Study Date: 2025 08:18 AM  Age: 34 yrs  Gender: Male  Patient Location: [facility]  Reason For Study: Cerebrovascular Incident  History: H/o cleft MV repair, ASD repair  Ordering Physician: DALE REDDY  Performed By: YOSELIN Stephens     BSA: 2.0 m2  Height: 70 in  Weight: 186 lb  HR: 60  BP: 116/80 mmHg  ______________________________________________________________________________  Procedure  Bubble Echocardiogram with two-dimensional, color and spectral Doppler.  ______________________________________________________________________________  Interpretation Summary     The mitral valve leaflets are mildly thickened.  There is mild (1+) mitral regurgitation.  A contrast injection (Bubble Study) was performed that was moderately positive  for flow across the interatrial septum.  Patient has history of ASD repair.  ______________________________________________________________________________  Left Ventricle  The left ventricle is normal in structure, function and size. Left ventricular  diastolic function is normal.     Right Ventricle  The right ventricle is normal in structure, function and size.     Atria  Normal left atrial size. Right atrial size is normal. A contrast injection  (Bubble Study) was performed that was moderately positive for flow across the  interatrial septum.     Mitral Valve  The  "mitral valve leaflets are mildly thickened. There is mild (1+) mitral  regurgitation.     Tricuspid Valve  Normal tricuspid valve.     Aortic Valve  The aortic valve is normal in structure and function.     Pulmonic Valve  Normal pulmonic valve.     Vessels  The aortic root is normal size. The inferior vena cava is normal.     Pericardium  There is no pericardial effusion.     ______________________________________________________________________________  MMode/2D Measurements & Calculations  IVSd: 0.88 cm  LVIDd: 4.9 cm  LVIDs: 3.2 cm  LVPWd: 0.97 cm  FS: 34.3 %     LV mass(C)d: 158.2 grams  LV mass(C)dI: 78.1 grams/m2  Ao root diam: 3.7 cm  LVOT diam: 2.4 cm  LVOT area: 4.6 cm2  Ao root diam index Ht(cm/m): 2.1  Ao root diam index BSA (cm/m2): 1.8  LA Volume (BP): 50.1 ml  LA Volume Index (BP): 24.8 ml/m2  RV Base: 2.9 cm  RWT: 0.40  TAPSE: 1.8 cm     Doppler Measurements & Calculations  MV E max daniel: 84.6 cm/sec  MV A max daniel: 57.2 cm/sec  MV E/A: 1.5  MV max PG: 3.7 mmHg  MV mean P.6 mmHg  MV V2 VTI: 29.1 cm  MV dec time: 0.21 sec  PA V2 max: 87.7 cm/sec  PA max PG: 3.1 mmHg  PA acc time: 0.11 sec  E/E' av.9  Lateral E/e': 6.9  Medial E/e': 8.8  RV S Daniel: 11.2 cm/sec     ______________________________________________________________________________  Report approved by: Chay Diamond MD on 2025 11:33 AM             Echo:  No results found for this or any previous visit (from the past 4320 hours).    Clinically Significant Risk Factors Present on Admission                   # Hypertension: Noted on problem list           # Overweight: Estimated body mass index is 26.79 kg/m  as calculated from the following:    Height as of this encounter: 1.778 m (5' 10\").    Weight as of this encounter: 84.7 kg (186 lb 11.2 oz).                                                                 "

## 2025-01-18 NOTE — DISCHARGE SUMMARY
"[facility]  Hospitalist Discharge Summary      Date of Admission:  1/16/2025  Date of Discharge:  1/17/2025  Discharging Provider: Whitney Gupta PA-C  Discharge Service: Hospitalist Service    Discharge Diagnoses   Left eye retinal artery occlusion  Partial blindness left eye      Clinically Significant Risk Factors     # Overweight: Estimated body mass index is 26.79 kg/m  as calculated from the following:    Height as of this encounter: 1.778 m (5' 10\").    Weight as of this encounter: 84.7 kg (186 lb 11.2 oz).       Follow-ups Needed After Discharge   Follow-up Appointments       Follow-up and recommended labs and tests       After a new diagnosis I always recommend following up with your primary care doctor either by physical appointment or sending a message to them about the updates.    Follow-up with cardiology as above    Follow-up with stroke ELO[facility] ([facility] only) in 4-8 weeks, neurology should contact you for appointment but if they do not you were seen by Ariela Gifford    If you have concerns tomorrow I will be working and you can call me at [phone]                Unresulted Labs Ordered in the Past 30 Days of this Admission       Date and Time Order Name Status Description    1/17/2025  9:06 AM Protein S Antigen Free In process     1/17/2025  9:06 AM Protein C chromogenic In process     1/17/2025  9:06 AM Lupus Anticoagulant Panel In process     1/17/2025  9:06 AM Antithrombin III In process             Discharge Disposition   Discharged to home  Condition at discharge: Stable    Hospital Course   Ludwin Massey is a 34 year old male with history of migraine, repaired atrial septal defect and hypertension admitted on 1/16/2025 with concern of left retinal occlusion causing left eye vision loss.     He was skiing this past weekend and noted that he could not see out of part of his left eye. He reported that the right upper visual field of his left eye was dark/black and that " he could not see in that area. He has had migraines in the past with vision changes so did not think much of it but over the last couple days it is stayed there without changing. He has a mild headache but otherwise has no other deficits. He denies chest pain, shortness of breath, cough, nausea, vomiting, diarrhea, abdominal pain and urinary symptoms. He does not drink alcohol regularly or smoke cigarettes.     The ED he was afebrile with heart rate of 78, pressure 177/104 and breathing comfortably on room air without hypoxia.  Lab work showed a normal BMP with the exception of CO2 31, normal LFTs, glucose 116, high-sensitivity troponin of 11 and normal CBC.  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  MRI brain showed no acute abnormality.  Stroke neurology was called recommending loading with Plavix and observation admission for further workup.     Since admission his vision deficit has not changed.  He was monitored on telemetry without arrhythmia and echocardiogram did show a positive bubble study in the area where he previously had ASD surgery as a child. Cardiology was consulted due to this finding with recommendation of further testing. Bilateral lower extremity ultrasounds were negative. They recommended GERMÁN but this cannot be done at [facility] at this time.  Cardiology will schedule this as an outpatient along with a referral to meet with the structural heart doctor to discuss potential procedure.  Cardiology also recommends a 30-day CardioNet monitor to look for atrial fibrillation as the patient had a old infarct seen on his MRI.  Stroke neurology also evaluated the patient and since this is not technically a stroke they recommend baby aspirin daily, follow-up with the retina specialist and recommended a CT venogram of the abdomen/pelvis which was negative.  They will follow-up with him in the outpatient  setting as well.        Consultations This Hospital Stay   NEUROLOGY IP STROKE CONSULT  OCCUPATIONAL THERAPY ADULT IP CONSULT  CARE MANAGEMENT / SOCIAL WORK IP CONSULT  CARDIOLOGY IP CONSULT    Code Status   Full Code    Time Spent on this Encounter   I, Whitney Gupta PA-C, personally saw the patient today and spent greater than 30 minutes discharging this patient.       Whitney Gupta PA-C  [facility] OBSERVATION DEPT  [address]  [facility] [address]  Phone: [phone]  ______________________________________________________________________    Physical Exam   Vital Signs: Temp: 98  F (36.7  C) Temp src: Oral BP: 138/80 Pulse: 80   Resp: 18 SpO2: 97 % O2 Device: None (Room air)    Weight: 186 lbs 11.2 oz  General Appearance: Alert and oriented x 3  Respiratory: Clear to auscultation bilaterally  Cardiovascular: RRR without murmur  GI: Bowel sounds are present without tenderness  Skin: No rashes or open sores are noted  Other: No lower extremity swelling noted       Primary Care Physician   Tamiko Flannery    Discharge Orders      Reason for your hospital stay    You were admitted for concerns vision loss in part of your right eye due to a small retinal artery occlusion.  We did a CT head which was negative for stroke, CTA head/neck which was negative and a MRI of your brain which did not show evidence of new stroke but did show  evidence of an old stroke.  You were seen by stroke neurology who recommended bilateral lower extremity ultrasounds which were negative for DVT and a CT venogram which was also negative for blood clot.  They recommend you take a baby aspirin daily and have added multiple blood clotting tests that are pending at this time.    You were also seen by cardiology due to history of your atrial septal defect and echocardiogram showing a positive bubble study meaning there is a possible hole where that procedure was done.  They are recommending a  transesophageal echocardiogram and meeting with a structural heart doctor which will be arranged on Monday.  They also recommend a 30-day heart monitor to look for atrial fibrillation.    For your headache you can use Tylenol, ibuprofen and we also have Reglan     Follow-up and recommended labs and tests     After a new diagnosis I always recommend following up with your primary care doctor either by physical appointment or sending a message to them about the updates.    Follow-up with cardiology as above    Follow-up with stroke ELOISE ([facility] only) in 4-8 weeks, neurology should contact you for appointment but if they do not you were seen by Ariela Gifford    If you have concerns tomorrow I will be working and you can call me at [phone]     Activity    Your activity upon discharge: activity as tolerated     Diet    Follow this diet upon discharge: Regular     Stroke Hospital Follow Up (for neurologist use only)    Please contact [facility] directly if fit in is needed  Pembe Panjur will call you to coordinate care as prescribed by your provider. If you don t hear from a representative within 2 business days, please call [phone].         Significant Results and Procedures   Most Recent 3 CBC's:  Recent Labs   Lab Test 01/17/25  0633 01/16/25  1243 07/13/23  0819   WBC 6.1 7.1 6.0   HGB 15.3 15.6 15.6   MCV 94 93 91    212 168     Most Recent 3 BMP's:  Recent Labs   Lab Test 01/17/25  0633 01/16/25  1243 07/13/23  0819    139 139   POTASSIUM 4.0 3.8 4.1   CHLORIDE 101 100 100   CO2 31* 31* 29   BUN 13.4 10.6 17.5   CR 1.05 0.98 1.04   ANIONGAP 7 8 10   BHUPENDRA 9.3 10.3 9.6   GLC 95 116* 102*   ,   Results for orders placed or performed during the hospital encounter of 01/16/25   Head CT w/o contrast    Narrative    HEAD AND NECK CT ANGIOGRAM WITH IV CONTRAST  [facility]  1/16/2025 2:19 PM CST    INDICATION: Visual disturbance, headache  TECHNIQUE: Head and neck CT angiogram with IV  contrast. Noncontrast head CT followed by axial helical CT images of the head and neck vessels obtained during the arterial phase of intravenous contrast administration. Axial helical 2D reconstructed images   and multiplanar 3D MIP reconstructed images of the head and neck vessels were performed by the technologist. Dose reduction techniques were used. Vessel stenoses reported according to NASCET criteria.  CONTRAST: 67mL Isovue 370  COMPARISON: None.    FINDINGS:  NONCONTRAST HEAD CT: No intracranial hemorrhage, extraaxial collection, mass effect or CT evidence of acute infarct.  Tiny chronic appearing infarct lateral left cerebellum. The ventricles and sulci are normal for age. Osseous structures are intact. The   visualized paranasal sinuses are free of significant disease. The mastoid air cells are free of significant disease. The orbits are unremarkable.    HEAD CTA: The intracranial circulation is patent without evidence for aneurysm, proximal vessel occlusion, high-grade intracranial stenosis or arteriovenous malformation.  Patent dural venous sinuses.    NECK CTA: Three vessel arch.  Carotid arteries are patent without atherosclerotic change.  No hemodynamically significant stenosis by NASCET criteria in either carotid system.  Patent vertebral arteries. No dissection.      Impression    CONCLUSION:  HEAD CT:  1.  No intracranial hemorrhage, mass, or definite CT evidence of recent ischemia.  2.  Tiny chronic appearing infarct lateral left cerebellum.    HEAD CTA:  1.  Negative. No vessel stenosis, occlusion or aneurysm.    NECK CTA:  1.  Negative. No dissection or hemodynamically significant narrowing in the neck by NASCET criteria.       CTA Head Neck with Contrast    Narrative    HEAD AND NECK CT ANGIOGRAM WITH IV CONTRAST  [facility]  1/16/2025 2:19 PM CST    INDICATION: Visual disturbance, headache  TECHNIQUE: Head and neck CT angiogram with IV contrast. Noncontrast head CT followed  by axial helical CT images of the head and neck vessels obtained during the arterial phase of intravenous contrast administration. Axial helical 2D reconstructed images   and multiplanar 3D MIP reconstructed images of the head and neck vessels were performed by the technologist. Dose reduction techniques were used. Vessel stenoses reported according to NASCET criteria.  CONTRAST: 67mL Isovue 370  COMPARISON: None.    FINDINGS:  NONCONTRAST HEAD CT: No intracranial hemorrhage, extraaxial collection, mass effect or CT evidence of acute infarct.  Tiny chronic appearing infarct lateral left cerebellum. The ventricles and sulci are normal for age. Osseous structures are intact. The   visualized paranasal sinuses are free of significant disease. The mastoid air cells are free of significant disease. The orbits are unremarkable.    HEAD CTA: The intracranial circulation is patent without evidence for aneurysm, proximal vessel occlusion, high-grade intracranial stenosis or arteriovenous malformation.  Patent dural venous sinuses.    NECK CTA: Three vessel arch.  Carotid arteries are patent without atherosclerotic change.  No hemodynamically significant stenosis by NASCET criteria in either carotid system.  Patent vertebral arteries. No dissection.      Impression    CONCLUSION:  HEAD CT:  1.  No intracranial hemorrhage, mass, or definite CT evidence of recent ischemia.  2.  Tiny chronic appearing infarct lateral left cerebellum.    HEAD CTA:  1.  Negative. No vessel stenosis, occlusion or aneurysm.    NECK CTA:  1.  Negative. No dissection or hemodynamically significant narrowing in the neck by NASCET criteria.       US Lower Extremity Venous Duplex Left    Narrative    EXAM: US LOWER EXTREMITY VENOUS DUPLEX LEFT  LOCATION: [facility]  DATE: 1/16/2025    INDICATION: Left leg pain. Stroke.  COMPARISON: None.  TECHNIQUE: Venous Duplex ultrasound of the left lower extremity with and without compression,  augmentation and duplex. Color flow and spectral Doppler with waveform analysis performed.    FINDINGS: Exam includes the common femoral, femoral, popliteal, and contralateral common femoral veins as well as segmentally visualized deep calf veins and greater saphenous vein.     LEFT: No deep vein thrombosis. No superficial thrombophlebitis. No popliteal cyst.      Impression    IMPRESSION:  1.  No deep venous thrombosis in the left lower extremity.   MR Brain w/o & w Contrast    Narrative    EXAM: MR BRAIN W/O and W CONTRAST  LOCATION: [facility]  DATE: 1/16/2025    INDICATION: reitnal stroke L eye  COMPARISON: Same day CTA head and neck.  CONTRAST: 9 mL Gadavist  TECHNIQUE: Routine multiplanar multisequence head MRI without and with intravenous contrast.    FINDINGS:  INTRACRANIAL CONTENTS: No acute or subacute infarct. No mass, acute hemorrhage, or extra-axial fluid collections. Scattered nonspecific foci of T2/FLAIR hyperintense signal in the cerebral white matter. Normal ventricles and sulci. Normal position of the   cerebellar tonsils. No pathologic contrast enhancement.    SELLA: No abnormality accounting for technique.    OSSEOUS STRUCTURES/SOFT TISSUES: Normal marrow signal. The major intracranial vascular flow voids are maintained.     ORBITS: No abnormality accounting for technique.     SINUSES/MASTOIDS: No paranasal sinus mucosal disease. No middle ear or mastoid effusion.       Impression    IMPRESSION:  1.   No acute intracranial abnormality. No discernible left orbital abnormality; however, recommend MRI orbits if there is concern for orbital or optic nerve pathology.  2.  Scattered nonspecific foci of signal abnormality throughout the cerebral white matter which may be seen in the setting of migraines.   US Lower Extremity Venous Duplex Right    Narrative    EXAM: US LOWER EXTREMITY VENOUS DUPLEX RIGHT  LOCATION: [facility]  DATE: 1/17/2025    INDICATION:  Thromboembolic disease, stroke  COMPARISON: None.  TECHNIQUE: Venous Duplex ultrasound of the right lower extremity with and without compression, augmentation and duplex. Color flow and spectral Doppler with waveform analysis performed.    FINDINGS: Exam includes the common femoral, femoral, popliteal, and contralateral common femoral veins as well as segmentally visualized deep calf veins and greater saphenous vein.     RIGHT: No deep vein thrombosis. No superficial thrombophlebitis. No popliteal cyst.      Impression    IMPRESSION:  1.  No deep venous thrombosis in the right lower extremity.   CTV Abdomen Pelvis w Contrast    Narrative    EXAM: CTV ABDOMEN PELVIS W CONTRAST  LOCATION: [facility]  DATE: 1/17/2025    INDICATION: Positive bubble study, BRAO assess for May-Thurner.  COMPARISON: None.  TECHNIQUE: Following the administration of intravenous contrast, contiguous axial images were obtained of the abdomen and pelvis. Attempt is made to capture the contrast bolus in the venous phase.  CONTRAST: 67 mL Isovue 370    FINDINGS:   VEINS: The greatest dimension of the left common iliac vein is approximately 12.2 mm, and the shortest dimension of the left iliac vein is approximately 8.4 mm situated posteriorly to the right common iliac artery. There is no evidence for clot or   collaterals.    ARTERIES: Normal.    NON ARTERIAL STRUCTURES: Small fatty umbilical hernia.      Impression    IMPRESSION:   1.  The greatest radial dimension of the left common iliac vein is approximately 12.2 mm and the shortest dimension is approximately 8.4 mm (posterior to the right common iliac artery). There is no evidence for clot or collaterals.    2.  Small fatty umbilical hernia.   Echocardiogram Complete w Bubble Study - For age < 60 yrs    Narrative    [id number]  [id number]  [id number]  [id number]^MAUREEN^DALE^TRACI     [facility]  Echocardiography Laboratory  [address] [place] [address]     Name: MEL GREER  MRN: [id number]  : 1990  Study Date: 2025 08:18 AM  Age: 34 yrs  Gender: Male  Patient Location: [facility]  Reason For Study: Cerebrovascular Incident  History: H/o cleft MV repair, ASD repair  Ordering Physician: DALE REDDY  Performed By: RAFA Stephens     BSA: 2.0 m2  Height: 70 in  Weight: 186 lb  HR: 60  BP: 116/80 mmHg  ______________________________________________________________________________  Procedure  Bubble Echocardiogram with two-dimensional, color and spectral Doppler.  ______________________________________________________________________________  Interpretation Summary     The mitral valve leaflets are mildly thickened.  There is mild (1+) mitral regurgitation.  A contrast injection (Bubble Study) was performed that was moderately positive  for flow across the interatrial septum.  Patient has history of ASD repair.  ______________________________________________________________________________  Left Ventricle  The left ventricle is normal in structure, function and size. Left ventricular  diastolic function is normal.     Right Ventricle  The right ventricle is normal in structure, function and size.     Atria  Normal left atrial size. Right atrial size is normal. A contrast injection  (Bubble Study) was performed that was moderately positive for flow across the  interatrial septum.     Mitral Valve  The mitral valve leaflets are mildly thickened. There is mild (1+) mitral  regurgitation.     Tricuspid Valve  Normal tricuspid valve.     Aortic Valve  The aortic valve is normal in structure and function.     Pulmonic Valve  Normal pulmonic valve.     Vessels  The aortic root is normal size. The inferior vena cava is normal.     Pericardium  There is no pericardial effusion.     ______________________________________________________________________________  MMode/2D Measurements & Calculations  IVSd: 0.88 cm  LVIDd: 4.9 cm  LVIDs: 3.2  cm  LVPWd: 0.97 cm  FS: 34.3 %     LV mass(C)d: 158.2 grams  LV mass(C)dI: 78.1 grams/m2  Ao root diam: 3.7 cm  LVOT diam: 2.4 cm  LVOT area: 4.6 cm2  Ao root diam index Ht(cm/m): 2.1  Ao root diam index BSA (cm/m2): 1.8  LA Volume (BP): 50.1 ml  LA Volume Index (BP): 24.8 ml/m2  RV Base: 2.9 cm  RWT: 0.40  TAPSE: 1.8 cm     Doppler Measurements & Calculations  MV E max daniel: 84.6 cm/sec  MV A max daniel: 57.2 cm/sec  MV E/A: 1.5  MV max PG: 3.7 mmHg  MV mean P.6 mmHg  MV V2 VTI: 29.1 cm  MV dec time: 0.21 sec  PA V2 max: 87.7 cm/sec  PA max PG: 3.1 mmHg  PA acc time: 0.11 sec  E/E' av.9  Lateral E/e': 6.9  Medial E/e': 8.8  RV S Daniel: 11.2 cm/sec     ______________________________________________________________________________  Report approved by: Chay Diamond MD on 2025 11:33 AM             Discharge Medications   Current Discharge Medication List        START taking these medications    Details   aspirin 81 MG EC tablet Take 1 tablet (81 mg) by mouth daily.    Associated Diagnoses: Branch retinal artery occlusion of left eye      metoclopramide (REGLAN) 10 MG tablet Take 1 tablet (10 mg) by mouth 3 times daily as needed for nausea. Or headache  Qty: 15 tablet, Refills: 0    Associated Diagnoses: Episodic tension-type headache, not intractable           CONTINUE these medications which have CHANGED    Details   acetaminophen (TYLENOL) 325 MG tablet Take 1-3 tablets (325-975 mg) by mouth every 6 hours as needed for mild pain or headaches.    Associated Diagnoses: Episodic tension-type headache, not intractable           CONTINUE these medications which have NOT CHANGED    Details   hydrochlorothiazide (HYDRODIURIL) 25 MG tablet Take 1 tablet (25 mg) by mouth daily.  Qty: 90 tablet, Refills: 3    Associated Diagnoses: Hypertension, unspecified type      ibuprofen (ADVIL/MOTRIN) 200 MG tablet Take 200 mg by mouth every 6 hours as needed for pain.      metoprolol succinate ER (TOPROL XL) 25 MG 24 hr tablet Take  1 tablet (25 mg) by mouth daily.  Qty: 90 tablet, Refills: 3    Associated Diagnoses: Hypertension, unspecified type; Congenital cleft leaflet of mitral valve; S/P closure of congenital atrial septal defect by percutaneous transcatheter technique; ASD (atrial septal defect), ostium primum           Allergies   No Known Allergies

## 2025-01-18 NOTE — PLAN OF CARE
Patient's After Visit Summary was reviewed with patient and/or spouse.   Patient verbalized understanding of After Visit Summary, recommended follow up and was given an opportunity to ask questions.   Discharge medications sent home with patient/family: LISA   Discharged with spouse

## 2025-01-20 LAB
AT III ACT/NOR PPP CHRO: 109 % (ref 85–135)
DRVVT SCREEN RATIO: 0.97
INR PPP: 0.99 (ref 0.85–1.15)
LA PPP-IMP: NEGATIVE
LUPUS INTERPRETATION: NORMAL
PROT C ACT/NOR PPP CHRO: 151 % (ref 70–170)
PROT S FREE AG ACT/NOR PPP IA: 138 % (ref 70–148)
PTT RATIO: 1.06
THROMBIN TIME: 18.2 SECONDS (ref 13–19)

## 2025-01-21 ENCOUNTER — TELEPHONE (OUTPATIENT)
Dept: CARDIOLOGY | Facility: CLINIC | Age: 35
End: 2025-01-21
Payer: COMMERCIAL

## 2025-01-21 DIAGNOSIS — R06.83 SNORING: ICD-10-CM

## 2025-01-21 DIAGNOSIS — Q24.9 ADULT CONGENITAL HEART DISEASE: ICD-10-CM

## 2025-01-21 DIAGNOSIS — R00.2 PALPITATIONS: ICD-10-CM

## 2025-01-21 DIAGNOSIS — Q21.20 ASD (ATRIAL SEPTAL DEFECT), OSTIUM PRIMUM: Primary | ICD-10-CM

## 2025-01-21 DIAGNOSIS — Z87.74 S/P CLOSURE OF CONGENITAL ATRIAL SEPTAL DEFECT BY PERCUTANEOUS TRANSCATHETER TECHNIQUE: ICD-10-CM

## 2025-01-21 NOTE — TELEPHONE ENCOUNTER
Called patient to discuss any post hospital d/c questions he may have, review medication changes, and confirm f/u appts. Patient denied any questions regarding new medications or changes to PTA medications.     Patient denied any SOB, chest pain, or light headedness. States he continues to have blind spot to left eye. Scheduled to see a retinal specialist this Friday and follow up with neurology the end of Feb.    RN confirmed with patient that he needs to be scheduled for a GERMÁN and SH OV as ordered. Scheduling and Dr. Masters's Team RN phone numbers provided.     Patient advised to call clinic with any cardiac related questions or concerns prior to this tabatha't. Patient verbalized understanding and agreed with plan. AMINATA Horn RN.

## 2025-01-21 NOTE — TELEPHONE ENCOUNTER
Patient was admitted to [facility] on 1/16/2025 with concern of left retinal occlusion causing left eye vision loss. 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 MRI brain showed no acute abnormality. Stroke neurology was called recommending loading with Plavix and observation admission for further workup.     PMH: history of migraine, repaired atrial septal defect and hypertension.     1/17/25: Echo showed the mitral valve leaflets are mildly thickened. There is mild (1+) mitral regurgitation. A contrast injection (Bubble Study) was performed that was moderately positive for flow across the interatrial septum. Patient has history of ASD repair.    Pt was started on ASA at time of discharge. Discharged wearing a 30 day heart monitor.    Pt needs to be scheduled for a follow up GERMÁN and SH OV as recommended by Dr. Masters, but no orders placed. Will route this note to Dr. Masters for further clarification and orders. Will then call pt. AMINATA Horn RN.

## 2025-01-21 NOTE — TELEPHONE ENCOUNTER
[facility] Call Center    Phone Message    May a detailed message be left on voicemail: yes    Reason for Call: Patient called to schedule an Echo GERMÁN and a Cardiology Structural/Valve appt with Eliana Masters. Please call back to further coordinate.    Thank you!  [facility]

## 2025-01-22 NOTE — TELEPHONE ENCOUNTER
Date: 1/22/2025    Time of Call: 10:15 AM     Diagnosis:  s/p ASD closure     [ TORB ] Ordering provider: ORIN Rojo MD    Order: GERMÁN and follow up with Dr Rojo, at home sleep study      Order received by: Nidhi Cruz RN     Follow-up/additional notes: pt recently admitted at [facility]    Ludwin Massey is a 34 year old male who was admitted on 1/16/2025.     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  GERMÁN to be done as outpt, patient will be called on Monday with the time  Follow-up with structural heart doctor with GERMÁN results. (TBD)  Cardionet 30day monitor to look for AF, may need to consider loop recorder even if monitor is negative.  Moderate complexity      Eliana Sheryl Masters DO, MD      History of Present Illness  Ludwin Massey is a 34 year old male who presents with  history of migraine, repaired atrial septal defect and hypertension admitted on 1/16/2025 with concern of left retinal occlusion causing left eye vision loss.     The ED he was afebrile with heart rate of 78, pressure 177/104 and breathing comfortably on room air without hypoxia.  Lab work showed a normal BMP with the exception of CO2 31, normal LFTs, glucose 116, high-sensitivity troponin of 11 and normal CBC.  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  MRI brain showed no acute abnormality.  Stroke neurology was called recommending loading with Plavix and observation admission for further workup.  ECG shows a sinus rhythm with diffuse ST and T wave abnormalities and borderline QTc at 463 ms  Echocardiogram shows previous ASD repair with moderately positive bubble study

## 2025-01-22 NOTE — TELEPHONE ENCOUNTER
Spoke with patient to updated Dr Rojo's team is working to get GERMÁN scheduled with [facility] physician on [facility] and will update patient with dates available. Patient verbalized understanding and is in agreement to the plan.

## 2025-01-27 ENCOUNTER — TELEPHONE (OUTPATIENT)
Dept: CARDIOLOGY | Facility: CLINIC | Age: 35
End: 2025-01-27
Payer: COMMERCIAL

## 2025-01-27 NOTE — TELEPHONE ENCOUNTER
Patient called looking to get scheduled for his GERMÁN that he stated that he is supposed to be having. I informed patient that I did not have orders for the testing. He stated that he has been in communication with Nidhi about this last week. He stated he was calling today as he has not heard anything. I informed the patient that I would get a message over to the team and he was in agreement with the plan.

## 2025-01-27 NOTE — TELEPHONE ENCOUNTER
Spoke with patient, offered 2/28 for GERMÁN and patient would like to see when the next available date is if able. Patient would also like an at home sleep study and to check if ok to ski while on baby aspirin.

## 2025-01-30 NOTE — TELEPHONE ENCOUNTER
Date: 1/30/2025    Time of Call: 2:53 PM     Diagnosis:  ASD     [ TORB ] Ordering provider: VANDANA Noble MD    Order: H&P with Dr Noble prior to GERMÁN      Order received by: Nidhi Cruz RN       Follow-up/additional notes: updated patient and scheduled

## 2025-01-30 NOTE — TELEPHONE ENCOUNTER
Eddie Begum on 1/30/2025     Action Taken Records internal  Surgery 2003 ASD  Followed by Dr. Wang

## 2025-02-02 ENCOUNTER — OFFICE VISIT (OUTPATIENT)
Dept: URGENT CARE | Facility: URGENT CARE | Age: 35
End: 2025-02-02
Payer: COMMERCIAL

## 2025-02-02 VITALS
TEMPERATURE: 101.5 F | HEART RATE: 104 BPM | WEIGHT: 185 LBS | OXYGEN SATURATION: 100 % | SYSTOLIC BLOOD PRESSURE: 139 MMHG | BODY MASS INDEX: 26.54 KG/M2 | DIASTOLIC BLOOD PRESSURE: 85 MMHG | RESPIRATION RATE: 23 BRPM

## 2025-02-02 DIAGNOSIS — R05.1 ACUTE COUGH: Primary | ICD-10-CM

## 2025-02-02 LAB
FLUAV AG SPEC QL IA: NEGATIVE
FLUBV AG SPEC QL IA: NEGATIVE

## 2025-02-02 PROCEDURE — 99203 OFFICE O/P NEW LOW 30 MIN: CPT | Performed by: NURSE PRACTITIONER

## 2025-02-02 PROCEDURE — 87804 INFLUENZA ASSAY W/OPTIC: CPT

## 2025-02-02 NOTE — PATIENT INSTRUCTIONS
Results for orders placed or performed in visit on 02/02/25   Influenza A & B Antigen - Clinic Collect     Status: Normal    Specimen: Nose; Swab   Result Value Ref Range    Influenza A antigen Negative Negative    Influenza B antigen Negative Negative    Narrative    Test results must be correlated with clinical data. If necessary, results should be confirmed by a molecular assay or viral culture.     Influenza negative     Push fluids  Lots of handwashing.   Ibuprofen as needed for fever or pain  Delsym or dayquil/nyquil for cough as needed     Rest as able.   F/u in the clinic if symptoms persist or worsen.

## 2025-02-02 NOTE — PROGRESS NOTES
Assessment & Plan     Acute cough  - Influenza A & B Antigen - Clinic Collect     Patient Instructions     Results for orders placed or performed in visit on 02/02/25   Influenza A & B Antigen - Clinic Collect     Status: Normal    Specimen: Nose; Swab   Result Value Ref Range    Influenza A antigen Negative Negative    Influenza B antigen Negative Negative    Narrative    Test results must be correlated with clinical data. If necessary, results should be confirmed by a molecular assay or viral culture.     Influenza negative     Push fluids  Lots of handwashing.   Ibuprofen as needed for fever or pain  Delsym or dayquil/nyquil for cough as needed     Rest as able.   F/u in the clinic if symptoms persist or worsen.          Return in about 1 week (around 2/9/2025) for with regular provider if symptoms persist.    DEBBIE Gaspar CNP  [facility] CenterTIFFANIE Mascorro is a 35 year old male who presents to clinic today for the following health issues:  Chief Complaint   Patient presents with    Urgent Care     Cough today, fever,chills, headache, chest congestion,       HPI      URI Adult    Onset of symptoms was 1 day(s) ago.  Course of illness is worsening.    Severity moderate  Current and Associated symptoms: fever, cough - productive, sore throat, and fatigue  Treatment measures tried include Fluids and Rest.  Predisposing factors include ill contact: Family member  and exposure to influenza.      Review of Systems  Constitutional, HEENT, cardiovascular, pulmonary, GI, , musculoskeletal, neuro, skin, endocrine and psych systems are negative, except as otherwise noted.      Objective    /85   Pulse 104   Temp (!) 101.5  F (38.6  C) (Tympanic)   Resp 23   Wt 83.9 kg (185 lb)   SpO2 100%   BMI 26.54 kg/m    Physical Exam   GENERAL: alert and no distress  EYES: Eyes grossly normal to inspection, PERRL and conjunctivae and sclerae normal  HENT: ear canals and TM's  normal, nose and mouth without ulcers or lesions  NECK: no adenopathy, no asymmetry, masses, or scars  RESP: lungs clear to auscultation - no rales, rhonchi or wheezes  CV: regular rate and rhythm, normal S1 S2, no S3 or S4, no murmur, click or rub, no peripheral edema  MS: no gross musculoskeletal defects noted, no edema

## 2025-02-03 ENCOUNTER — MYC MEDICAL ADVICE (OUTPATIENT)
Dept: CARDIOLOGY | Facility: CLINIC | Age: 35
End: 2025-02-03
Payer: COMMERCIAL

## 2025-02-03 ENCOUNTER — TELEPHONE (OUTPATIENT)
Dept: FAMILY MEDICINE | Facility: CLINIC | Age: 35
End: 2025-02-03
Payer: COMMERCIAL

## 2025-02-03 ENCOUNTER — TELEPHONE (OUTPATIENT)
Dept: CARDIOLOGY | Facility: CLINIC | Age: 35
End: 2025-02-03
Payer: COMMERCIAL

## 2025-02-03 NOTE — TELEPHONE ENCOUNTER
S-(situation): Patient wondering if he can get Tamiflu. Wants to know benefits verus risks.       B-(background): Patient states he tested positive for influenza with at home test today. Patient was seen by urgent care on 02/02/2025.    A-(assessment): Patient calls and reports he tested negative for influenza at urgent care yesterday and with an at home test.     Patient states his symptoms of congestion tend to go to his lungs and he wants something to help with his symptoms.     R-(recommendations):   RN notified patient that RN protocol cannot prescribe Tamiflu based on at home postive test and will need to refer to urgent care provider pool since this is where patient was seen.    Thank you,  Elie, Triage RN Star Rudolph    9:04 AM 2/3/2025

## 2025-02-03 NOTE — TELEPHONE ENCOUNTER
Patient called and stated that he tested positive for influenza A and would like to know what medications he can take with his heart condition. I informed the patient that I would get a message over to the team and someone would reach out to him to further discuss. Patient was in agreement with that plan.

## 2025-02-04 NOTE — TELEPHONE ENCOUNTER
Called and spoke to patient, was told to return for any new or worsening symptoms, he understands plan.     Coty Nunes Certified Medical Assistant

## 2025-02-06 DIAGNOSIS — Q21.20 ASD (ATRIAL SEPTAL DEFECT), OSTIUM PRIMUM: Primary | ICD-10-CM

## 2025-02-07 NOTE — PROGRESS NOTES
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 He has been referred for GERMÁN to assess his intracardiac shunt. He reports having frequent palpitations that he \"breaks\" by performing a valsalva. Given his strong family history of DVT/PE and positive bubble study with valsalva I think it is possible that this stroke was a paradoxical embolus through a residual intracardiac shunt. His GERMÁN is scheduled for 2/28.  We discussed the risks/benefits of the procedure. Specifically we discussed the risk for injury to the teeth, throat, esophagus and stomach. He has no contraindications to GERMÁN. Procedure will be done with pediatric cardiac anesthesia on the [facility]. He will continue his aspirin and complete his zio monitor. I do not think he needs to hold his blood pressure medications the day of the procedure.       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 Risk/Benefit discussed, will do consent day of procedure. Will try to use left arm for IV to evaluate for LSVC and unroofed coronary sinus.    Remainder of [facility] care per Dr. Rojo    Cardiac medications:  Hydrochlorothiazide 25 daily  Toprol XL 25 daily  ASA 81 mg daily      Follow up: with Dr. Rojo post procedure  Testing at time of follow up: per Dr. Rojo  Endocarditis prophylaxis indicated: yes if there is patch leak.  Activity Restrictions: No  Special notes for PCP/ER personnel: fine air filters on all IV's    Dr. NAEEM Noble  Adult Congenital Heart Disease    I spent 35 minutes during this visit reviewing the chart, performing a history and physical examination, counseling the patient and documenting the encounter.    ____________________    CC: Primum ASD, cleft MV    HPI:  I had the " pleasure of seeing Ludwin Massey  in the [facility] clinic in consultation for his history of congenital heart disease. 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  His complete cardiac history is listed below.  Briefly he was diagnosed at the age of 13 and underwent surgery shortly thereafter. This has been his only intervention.     In January of 2025 he presented with vision loss and was found to have a chronic appearing infarct in his left lateral cerebellum. MRI showed no acute abnormality. He had an echo with a positive bubble study. He continues to have vision loss on the right.     He is here today for a pre-procedure visit for his upcoming GERMÁN.  Ludwin reports ongoing right sided vision loss [place]. He has frequent palpitations. He recently had influenza and had palpitations several times/day that he can break with a Valsalva. He reports that these palpitations are what prompted the diagnosis of his primum ASD/cleft MV. He can have them several times a week and more with episodes of dehydration.       Congenital Cardiac History  Initial Diagnosis: Primum ASD and cleft MV  Primum ASD  Surgical repair age 13   Presumed residual shunt with positive bubble study 1/2025  Cleft MV  Surgical repair age 13  Mild regurgitation through residual cleft echo 1/2025  No significant stenosis  No LVOT obstruction        Surgical/Transcatheter Interventions  2017: Surgical repair of primum ASD and cleft MV [facility]    Arrhythmia issues:  SVT last holter 2021 showed 6 runs of paroxysmal SVT longest lasting 1 min 26 seconds, 15 pauses longest 3.2 seconds no AV block.       Acquired heart disease:  Coronary disease: No known  Endocarditis: no  Pulmonary Hypertension: no  Hypertension: Yes on treatment  High Cholesterol: no  Heart Failure: no        Past Medical History:   Diagnosis Date    ASD (atrial septal defect), ostium primum     Cleft leaflet of mitral valve     Migraine     Stroke,  embolic (H)      Past Surgical History:   Procedure Laterality Date    ASD REPAIR, OSTIUM PRIMUM  2017    UMN    VASECTOMY       Family History   Problem Relation Age of Onset    Hypertension Father     Thrombosis Father 42        DVT    Other - See Comments Father         WPW    Thrombosis Mother     Colon Cancer Maternal Grandfather     Migraines Paternal Grandmother     Dementia Paternal Grandfather     Hypertension Brother     Other - See Comments Paternal Aunt         Mitral valve prolapse   FH of blood clots, Mom had PE in 30's on OCP's, father had fatal DVT in 40's was a  and obese    Social History     Tobacco Use    Smoking status: Never    Smokeless tobacco: Never   Substance Use Topics    Alcohol use: Yes     Comment: very rare    Drug use: No         Current Outpatient Medications:     acetaminophen (TYLENOL) 325 MG tablet, Take 1-3 tablets (325-975 mg) by mouth every 6 hours as needed for mild pain or headaches., Disp: , Rfl:     aspirin 81 MG EC tablet, Take 1 tablet (81 mg) by mouth daily., Disp: , Rfl:     hydrochlorothiazide (HYDRODIURIL) 25 MG tablet, Take 1 tablet (25 mg) by mouth daily., Disp: 90 tablet, Rfl: 3    ibuprofen (ADVIL/MOTRIN) 200 MG tablet, Take 200 mg by mouth every 6 hours as needed for pain., Disp: , Rfl:     metoprolol succinate ER (TOPROL XL) 25 MG 24 hr tablet, Take 1 tablet (25 mg) by mouth daily., Disp: 90 tablet, Rfl: 3    metoclopramide (REGLAN) 10 MG tablet, Take 1 tablet (10 mg) by mouth 3 times daily as needed for nausea. Or headache (Patient not taking: Reported on 2/2/2025), Disp: 15 tablet, Rfl: 0   No Known Allergies    Review Of Systems    Eyes: positive for right vision loss  Ears/Nose/Throat: negative, persistent sore throat  Respiratory: No shortness of breath, dyspnea on exertion, cough, or hemoptysis  Cardiovascular: positive for palpitations  Gastrointestinal: negative for, dysphagia, and hematemesis  Neurologic:positive for migraine  headaches  Hematologic/Lymphatic/Immunologic: negative      /89 (BP Location: Left arm, Patient Position: Chair, Cuff Size: Adult Regular)   Pulse 83   Wt 86.6 kg (191 lb)   SpO2 99%   BMI 27.41 kg/m    Physical Exam  Vitals reviewed.   Constitutional:       General: He is not in acute distress.     Appearance: Normal appearance. He is not ill-appearing, toxic-appearing or diaphoretic.   HENT:      Head: Normocephalic and atraumatic.      Right Ear: External ear normal.      Left Ear: External ear normal.      Nose: Nose normal. No congestion or rhinorrhea.      Mouth/Throat:      Mouth: Mucous membranes are moist.      Dentition: Normal dentition.      Comments: Mallampati 1  Eyes:      General: No scleral icterus.        Right eye: No discharge.         Left eye: No discharge.      Conjunctiva/sclera: Conjunctivae normal.   Cardiovascular:      Comments: Well healed sternotomy. Quiet precordium, normal S1/physiologic splitting of S2. Gr I/VI systolic blowing murmur at apex, No rubs/gallops. Distal perfusion normal.     Pulmonary:      Effort: Pulmonary effort is normal.      Breath sounds: Normal breath sounds. No wheezing or rales.   Abdominal:      General: Abdomen is flat. Bowel sounds are normal. There is no distension.      Palpations: Abdomen is soft.   Musculoskeletal:         General: No deformity.      Cervical back: Normal range of motion.      Right lower leg: No edema.      Left lower leg: No edema.   Skin:     General: Skin is warm.      Capillary Refill: Capillary refill takes less than 2 seconds.   Neurological:      General: No focal deficit present.      Mental Status: He is alert. Mental status is at baseline.   Psychiatric:         Mood and Affect: Mood normal.           I personally reviewed the following studies and have given my interpretation below:    EK2025 normal sinus rhythm, rsr' in V1, non-specific t wave changes    Event monitor: Pending    Echo: 25  Repaired  cleft in left AV valve with mild regurgitation through residual cleft. The anterior leaflet is mildly thickened. Echo bright ventricular septum near LVOT. The atrial septum is also thickened near the sight of primum ASD repair. There are somewhat delayed appearance of agitated saline in the left atrium, it is not entirely clear where it is seen to enter, in some views it may be near the thickened portion of the atrial septum.      Adult read  The mitral valve leaflets are mildly thickened.  There is mild (1+) mitral regurgitation.  A contrast injection (Bubble Study) was performed that was moderately positive  for flow across the interatrial septum.  Patient has history of ASD repair.

## 2025-02-12 ENCOUNTER — OFFICE VISIT (OUTPATIENT)
Dept: CARDIOLOGY | Facility: CLINIC | Age: 35
End: 2025-02-12
Attending: STUDENT IN AN ORGANIZED HEALTH CARE EDUCATION/TRAINING PROGRAM
Payer: COMMERCIAL

## 2025-02-12 ENCOUNTER — PRE VISIT (OUTPATIENT)
Dept: CARDIOLOGY | Facility: CLINIC | Age: 35
End: 2025-02-12
Payer: COMMERCIAL

## 2025-02-12 VITALS
SYSTOLIC BLOOD PRESSURE: 133 MMHG | HEART RATE: 83 BPM | BODY MASS INDEX: 27.41 KG/M2 | OXYGEN SATURATION: 99 % | DIASTOLIC BLOOD PRESSURE: 89 MMHG | WEIGHT: 191 LBS

## 2025-02-12 DIAGNOSIS — Q24.9 ADULT CONGENITAL HEART DISEASE: ICD-10-CM

## 2025-02-12 DIAGNOSIS — Q21.20 ASD (ATRIAL SEPTAL DEFECT), OSTIUM PRIMUM: Primary | ICD-10-CM

## 2025-02-12 DIAGNOSIS — Q21.10 RESIDUAL ASD (ATRIAL SEPTAL DEFECT) FOLLOWING REPAIR: ICD-10-CM

## 2025-02-12 DIAGNOSIS — Q23.82 CONGENITAL CLEFT LEAFLET OF MITRAL VALVE: ICD-10-CM

## 2025-02-12 PROCEDURE — 99213 OFFICE O/P EST LOW 20 MIN: CPT | Performed by: STUDENT IN AN ORGANIZED HEALTH CARE EDUCATION/TRAINING PROGRAM

## 2025-02-12 PROCEDURE — 93005 ELECTROCARDIOGRAM TRACING: CPT

## 2025-02-12 PROCEDURE — 99203 OFFICE O/P NEW LOW 30 MIN: CPT | Performed by: STUDENT IN AN ORGANIZED HEALTH CARE EDUCATION/TRAINING PROGRAM

## 2025-02-12 ASSESSMENT — PAIN SCALES - GENERAL: PAINLEVEL_OUTOF10: NO PAIN (0)

## 2025-02-12 NOTE — NURSING NOTE
Chief Complaint   Patient presents with    New Patient     NEW CONGENITAL HEART      Vitals were taken, medications reconciled, and EKG was performed.    Bandar Mendoza, EMT  8:10 AM

## 2025-02-12 NOTE — LETTER
"2/12/2025      RE: Ludwin Massey  [address] [facility] [address]       Dear Colleague,    Thank you for the opportunity to participate in the care of your patient, Ludwin Massey, at the [facility] HEART [facility]. Please see a copy of my visit note below.    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 He has been referred for GERMÁN to assess his intracardiac shunt. He reports having frequent palpitations that he \"breaks\" by performing a valsalva. Given his strong family history of DVT/PE and positive bubble study with valsalva I think it is possible that this stroke was a paradoxical embolus through a residual intracardiac shunt. His GERMÁN is scheduled for 2/28.  We discussed the risks/benefits of the procedure. Specifically we discussed the risk for injury to the teeth, throat, esophagus and stomach. He has no contraindications to GERMÁN. Procedure will be done with pediatric cardiac anesthesia on the [facility]. He will continue his aspirin and complete his zio monitor. I do not think he needs to hold his blood pressure medications the day of the procedure.       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 Risk/Benefit discussed, will do consent day of procedure. Will try to use left arm for IV to evaluate for LSVC and unroofed coronary sinus.    Remainder of ACHD care per Dr. Rojo    Cardiac medications:  Hydrochlorothiazide 25 daily  Toprol XL 25 daily  ASA 81 mg daily      Follow up: with Dr. Rojo post procedure  Testing at time of follow up: per Dr. Rojo  Endocarditis prophylaxis indicated: yes if there is patch leak.  Activity Restrictions: No  Special notes " for PCP/ER personnel: fine air filters on all IV's    Dr. NAEEM Noble  Adult Congenital Heart Disease    I spent 35 minutes during this visit reviewing the chart, performing a history and physical examination, counseling the patient and documenting the encounter.    ____________________    CC: Primum ASD, cleft MV    HPI:  I had the pleasure of seeing Ludwin Massey  in the [facility] A[facility]D clinic in consultation for his history of congenital heart disease. 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  His complete cardiac history is listed below.  Briefly he was diagnosed at the age of 13 and underwent surgery shortly thereafter. This has been his only intervention.     In January of 2025 he presented with vision loss and was found to have a chronic appearing infarct in his left lateral cerebellum. MRI showed no acute abnormality. He had an echo with a positive bubble study. He continues to have vision loss on the right.     He is here today for a pre-procedure visit for his upcoming GERMÁN.  Ludwin reports ongoing right sided vision loss [place]. He has frequent palpitations. He recently had influenza and had palpitations several times/day that he can break with a Valsalva. He reports that these palpitations are what prompted the diagnosis of his primum ASD/cleft MV. He can have them several times a week and more with episodes of dehydration.       Congenital Cardiac History  Initial Diagnosis: Primum ASD and cleft MV  Primum ASD  Surgical repair age 13   Presumed residual shunt with positive bubble study 1/2025  Cleft MV  Surgical repair age 13  Mild regurgitation through residual cleft echo 1/2025  No significant stenosis  No LVOT obstruction        Surgical/Transcatheter Interventions  2017: Surgical repair of primum ASD and cleft MV [facility]    Arrhythmia issues:  SVT last holter 2021 showed 6 runs of paroxysmal SVT longest lasting 1 min 26 seconds, 15 pauses longest 3.2 seconds  no AV block.       Acquired heart disease:  Coronary disease: No known  Endocarditis: no  Pulmonary Hypertension: no  Hypertension: Yes on treatment  High Cholesterol: no  Heart Failure: no        Past Medical History:   Diagnosis Date     ASD (atrial septal defect), ostium primum      Cleft leaflet of mitral valve      Migraine      Stroke, embolic (H)      Past Surgical History:   Procedure Laterality Date     ASD REPAIR, OSTIUM PRIMUM  2017    UMN     VASECTOMY       Family History   Problem Relation Age of Onset     Hypertension Father      Thrombosis Father 42        DVT     Other - See Comments Father         WPW     Thrombosis Mother      Colon Cancer Maternal Grandfather      Migraines Paternal Grandmother      Dementia Paternal Grandfather      Hypertension Brother      Other - See Comments Paternal Aunt         Mitral valve prolapse   FH of blood clots, Mom had PE in 30's on OCP's, father had fatal DVT in 40's was a  and obese    Social History     Tobacco Use     Smoking status: Never     Smokeless tobacco: Never   Substance Use Topics     Alcohol use: Yes     Comment: very rare     Drug use: No         Current Outpatient Medications:      acetaminophen (TYLENOL) 325 MG tablet, Take 1-3 tablets (325-975 mg) by mouth every 6 hours as needed for mild pain or headaches., Disp: , Rfl:      aspirin 81 MG EC tablet, Take 1 tablet (81 mg) by mouth daily., Disp: , Rfl:      hydrochlorothiazide (HYDRODIURIL) 25 MG tablet, Take 1 tablet (25 mg) by mouth daily., Disp: 90 tablet, Rfl: 3     ibuprofen (ADVIL/MOTRIN) 200 MG tablet, Take 200 mg by mouth every 6 hours as needed for pain., Disp: , Rfl:      metoprolol succinate ER (TOPROL XL) 25 MG 24 hr tablet, Take 1 tablet (25 mg) by mouth daily., Disp: 90 tablet, Rfl: 3     metoclopramide (REGLAN) 10 MG tablet, Take 1 tablet (10 mg) by mouth 3 times daily as needed for nausea. Or headache (Patient not taking: Reported on 2/2/2025), Disp: 15 tablet, Rfl: 0   No  Known Allergies    Review Of Systems    Eyes: positive for right vision loss  Ears/Nose/Throat: negative, persistent sore throat  Respiratory: No shortness of breath, dyspnea on exertion, cough, or hemoptysis  Cardiovascular: positive for palpitations  Gastrointestinal: negative for, dysphagia, and hematemesis  Neurologic:positive for migraine headaches  Hematologic/Lymphatic/Immunologic: negative      /89 (BP Location: Left arm, Patient Position: Chair, Cuff Size: Adult Regular)   Pulse 83   Wt 86.6 kg (191 lb)   SpO2 99%   BMI 27.41 kg/m    Physical Exam  Vitals reviewed.   Constitutional:       General: He is not in acute distress.     Appearance: Normal appearance. He is not ill-appearing, toxic-appearing or diaphoretic.   HENT:      Head: Normocephalic and atraumatic.      Right Ear: External ear normal.      Left Ear: External ear normal.      Nose: Nose normal. No congestion or rhinorrhea.      Mouth/Throat:      Mouth: Mucous membranes are moist.      Dentition: Normal dentition.      Comments: Mallampati 1  Eyes:      General: No scleral icterus.        Right eye: No discharge.         Left eye: No discharge.      Conjunctiva/sclera: Conjunctivae normal.   Cardiovascular:      Comments: Well healed sternotomy. Quiet precordium, normal S1/physiologic splitting of S2. Gr I/VI systolic blowing murmur at apex, No rubs/gallops. Distal perfusion normal.     Pulmonary:      Effort: Pulmonary effort is normal.      Breath sounds: Normal breath sounds. No wheezing or rales.   Abdominal:      General: Abdomen is flat. Bowel sounds are normal. There is no distension.      Palpations: Abdomen is soft.   Musculoskeletal:         General: No deformity.      Cervical back: Normal range of motion.      Right lower leg: No edema.      Left lower leg: No edema.   Skin:     General: Skin is warm.      Capillary Refill: Capillary refill takes less than 2 seconds.   Neurological:      General: No focal deficit  present.      Mental Status: He is alert. Mental status is at baseline.   Psychiatric:         Mood and Affect: Mood normal.           I personally reviewed the following studies and have given my interpretation below:    EK2025 normal sinus rhythm, rsr' in V1, non-specific t wave changes    Event monitor: Pending    Echo: 25  Repaired cleft in left AV valve with mild regurgitation through residual cleft. The anterior leaflet is mildly thickened. Echo bright ventricular septum near LVOT. The atrial septum is also thickened near the sight of primum ASD repair. There are somewhat delayed appearance of agitated saline in the left atrium, it is not entirely clear where it is seen to enter, in some views it may be near the thickened portion of the atrial septum.      Adult read  The mitral valve leaflets are mildly thickened.  There is mild (1+) mitral regurgitation.  A contrast injection (Bubble Study) was performed that was moderately positive  for flow across the interatrial septum.  Patient has history of ASD repair.        Please do not hesitate to contact me if you have any questions/concerns.     Sincerely,     Jacinta Noble MD

## 2025-02-12 NOTE — PATIENT INSTRUCTIONS
"Thank you for visiting the Adult Congenital and Cardiovascular Genetics Clinic at the [facility].    Cardiology Providers you saw during your visit:  NAEEM Noble MD    Diagnosis:  ASD    Results:  NAEEM Noble MD reviewed the results of your EKG testing today in clinic.    If you have questions or concerns, please call us at [phone] or contact us through [employer].  ______________________________________________________________________________    Recommendations from your Cardiology Provider TODAY:    Proceed with transesophageal echocardiogram (GERMÁN) as previously planned.       ____________________________________________________________________________________________________    Follow-up Plan:  Follow-up with Dr. Rojo as previously planned.     ____________________________________________________________________________________________________    If you have questions or concerns, please call us at [phone] or contact us through [employer]. Our fax number is [phone]    Nidhi Cruz RN RN, BSN   Susie Ingram (Scheduling)  Nurse Care Coordinator     Clinic   Adult Congenital and CV Genetics              Adult Congenital and CV Genetics  [facility] Care              [facility]        For after hours urgent needs, call [phone] and ask to speak to the \"On-Call Cardiologist.\"    For emergencies call 911.      ____________________________________________________________________________________________________    Additional Important Information for Your Heart Health      General Cardiac Recommendations:  Continue to eat a heart healthy, low salt diet.  Continue to get 20-30 minutes of aerobic activity, 4-5 days per week.  Examples of aerobic activity include walking, running, swimming, cycling, etc.  Continue to observe good oral hygiene, with regular dental visits.        SBE prophylaxis (antibiotics " needed before dental appointments):   Yes____  No__X__    SBE prophylaxis applies to patients with certain heart conditions who are recommended to take antibiotics before dental appointments and other specific procedures. These antibiotics are to help prevent an infection of the heart (endocarditis) that certain patients are at higher risk of developing. The guidelines used come from the American Heart Association and are periodically updated.          FASTING CHOLESTEROL was checked in the last 5 years YES__X__  NO____ (2025)  If no, please follow up with your primary care physician. You should have a cholesterol screening every 5 years at minimum, and every year if taking a medication for your cholesterol levels.

## 2025-02-13 LAB
ATRIAL RATE - MUSE: 83 BPM
DIASTOLIC BLOOD PRESSURE - MUSE: NORMAL MMHG
INTERPRETATION ECG - MUSE: NORMAL
P AXIS - MUSE: 13 DEGREES
PR INTERVAL - MUSE: 192 MS
QRS DURATION - MUSE: 102 MS
QT - MUSE: 384 MS
QTC - MUSE: 451 MS
R AXIS - MUSE: -1 DEGREES
SYSTOLIC BLOOD PRESSURE - MUSE: NORMAL MMHG
T AXIS - MUSE: 94 DEGREES
VENTRICULAR RATE- MUSE: 83 BPM

## 2025-02-17 ENCOUNTER — MYC MEDICAL ADVICE (OUTPATIENT)
Dept: CARDIOLOGY | Facility: CLINIC | Age: 35
End: 2025-02-17
Payer: COMMERCIAL

## 2025-02-21 ENCOUNTER — PRE VISIT (OUTPATIENT)
Dept: CARDIOLOGY | Facility: CLINIC | Age: 35
End: 2025-02-21
Payer: COMMERCIAL

## 2025-02-23 ENCOUNTER — TRANSFERRED RECORDS (OUTPATIENT)
Dept: HEALTH INFORMATION MANAGEMENT | Facility: CLINIC | Age: 35
End: 2025-02-23
Payer: COMMERCIAL

## 2025-02-25 NOTE — PROGRESS NOTES
"__________________________________________________________      [facility] [place]   [phone]  __________________________________________________________         History of Present Illness   Chief Complaint: Patient presents with:  Stroke: Stroke      Ludwin Massey is a 35 year old male presenting for follow-up for L BRAO.     He was hospitalized at [facility] on 1/16/24. Prior to the hospital stay, he had a past medical history of HTN and ASD (astium primum) and cleft mitral valve s/p repair and migraine with visual aura. He presented to the hospital with 3 days of L visual symptoms diagnosed as L BRAO by outpatient eye specialist and referred to the ED. he also noted an episode 2 months prior of left facial sensory change and possibly some mild disorientation.  Also notes significant family history of blood clots including a PE in his mother in her 30s, fatal DVT in his father in his 40s.  Arrived with no evidence of stroke.  TTE with moderately positive bubble study.  It was recommended that he initiate aspirin for stroke prevention.  Given suspicion of possible cardioembolic stroke plan was for him to follow-up outpatient with cardiology regarding newly positive bubble study.    He saw cardiology 2/12/2025 and feel that her stroke was potentially due to paradoxical embolus to residual intracardiac shunt; recommended GERMÁN (scheduled for 2/28) ith further recommendations following.    Today, Ludwin reports he has been doing generally well since discharging from the hospital.  He reports that his vision is \"marginally better.\"  Fortunately the ongoing visual deficit is a very small portion and does not affect his central vision; it is not affecting any of his daily tasks or activities.  He has followed up with a retinal specialist twice who did reconfirm the diagnosis of BRAO.  He has had some headaches since discharging from the hospital.  He reports that initially this was " an achy feeling behind the left eye and to the left temporal region that was quite constant; he was using OTCs consistently to improve symptoms.  However headaches have now gradually improved and he has not needed any OTC analgesics in the past 2 weeks.  He does continue to have a degree of mild headache a couple of days per week on average.  He feels that mental health has been stable.  He denies any new symptoms since discharging from the hospital.    He confirms that he is taking aspirin daily with no concern of side effect such as abnormal bleeding or excessive bruising.  He is checking blood pressure at home about 2-3 times per week with average readings in the 120s/80s.    Modified [place] Scale  Score: 1-No significant disability despite symptoms; able to carry out all usual duties and activities    Stroke Evaluation Summarized:  New results resulted and reviewed by me today are in BOLD below.  I personally reviewed the following neuroimaging studies today and the comments above reflect my own personal interpretation of the images: images: MRI brain and I also showed MRI to the patient myself today.    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, subtle chronic white matter changes    Intracranial Vasculature CTA: No LVO or significant stenosis   Cervical Vasculature CTA: No LVO, significant stenosis or dissection       Echocardiogram LV normal in structure, function and size, normal biatrial size, bubble study moderately positive    EKG/Telemetry Sinus rhythm   Other Testing LLE US: no DVT  RLE US: no DVT     CTV abdomen/pelvis: no evidence of clot     ESR: 6  CRP: <3.00  Hypercoagulable panel: antithrombin III, Beta-2 glycoprotein, cardiolipin blair,     Cardiac event monitor: no atrial fibrillation identified     Labs Lab Results   Component Value Date     (H) 01/16/2025    A1C 5.2 01/16/2025    CTROPT 11 01/16/2025    INR 0.99  "01/17/2025    INR 0.92 01/16/2025               Home Medications     Current Outpatient Medications   Medication Sig Dispense Refill    acetaminophen (TYLENOL) 325 MG tablet Take 1-3 tablets (325-975 mg) by mouth every 6 hours as needed for mild pain or headaches.      aspirin 81 MG EC tablet Take 1 tablet (81 mg) by mouth daily.      hydrochlorothiazide (HYDRODIURIL) 25 MG tablet Take 1 tablet (25 mg) by mouth daily. 90 tablet 3    ibuprofen (ADVIL/MOTRIN) 200 MG tablet Take 200 mg by mouth every 6 hours as needed for pain.      metoprolol succinate ER (TOPROL XL) 25 MG 24 hr tablet Take 1 tablet (25 mg) by mouth daily. 90 tablet 3     No current facility-administered medications for this visit.            Physical Examination     Physical Exam    Estimated body mass index is 27.41 kg/m  as calculated from the following:    Height as of 1/16/25: 1.778 m (5' 10\").    Weight as of 2/12/25: 86.6 kg (191 lb).    /90   Pulse 76   SpO2 100%        General Exam  General: Sitting up in chair in no acute distress  Pulmonary:  no respiratory distress    Neurologic:  Mental Status:  alert, oriented x 3, follows commands, speech clear and fluent  Cranial Nerves:  visual fields intact, EOMI with normal smooth pursuit, facial sensation intact and symmetric, facial movements symmetric, hearing not formally tested but intact to conversation, palate elevation symmetric and uvula midline, no dysarthria, shoulder shrug strong bilaterally, tongue protrusion midline  Motor:  normal muscle tone and bulk, no abnormal movements, able to move all limbs spontaneously, strength 5/5 throughout upper and lower extremities, no pronator drift  Sensory:  light touch sensation intact and symmetric throughout upper and lower extremities, no extinction on double simultaneous stimulation   Coordination:  normal finger-to-nose and heel-to-shin bilaterally without dysmetria  Station/Gait:  deferred           Screenings and Questionnaires: "     Tobacco:    Tobacco Use      Smoking status: Never      Smokeless tobacco: Never      Sleep Apnea:       Depression:      2/21/2025     4:57 PM 10/18/2023     9:53 AM   PHQ-2 ( 1999 [employer])   Q1: Little interest or pleasure in doing things 0 0   Q2: Feeling down, depressed or hopeless 0 0   PHQ-2 Score 0  0   Q1: Little interest or pleasure in doing things Not at all    Q2: Feeling down, depressed or hopeless Not at all    PHQ-2 Score 0        Patient-reported       Stroke Recovery and Risk Factors:      2/21/2025     5:00 PM   Stroke Questionnaire   Residual effects: Any residual effects from stroke? 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  No other residual effects.   Level of independence: Walking Independent   Level of independence: Eating Independent   Level of independence: Stairs Independent   Level of independence: Dressing Independent   Level of independence: Bathing Independent   Level of independence: Toileting Independent   Incontinence: Bowel? No   Incontinence: Bladder? No   Able to: Use electronics Yes   Able to: Cook or do house chores Yes   Able to: Do own shopping, including online Yes   Able to: Manage own finances Yes   Current therapy: Physical Therapy No   Current therapy: Occupation Therapy No   Current therapy: Speech Therapy No   Current therapy: Other No   Current services: Home Health No, not needed   Medication: How do you take your meds Myself, from individual bottles   Medication: Do you ever miss or forget meds Rarely   Risk Factor: Checking blood pressure at home Yes   Risk Factor: Usual blood pressure numbers between 120/80 to 130/90   Risk Factor: Checking blood sugar at home No   Risk Factor: Second-hand smoke at home No   Risk Factor: How much caffeine per day Pre-stroke 3 cups of coffee each day.  Now, 1 cup coffee and 1-2 cups green tea   Who completed this questionnaire? The patient  independently             Assessment and Plan   L LINN, Jan 2025. He does have HTN, but this is historically well controlled and no evidence of ipsilateral carotid disease--thus elevated concern for embolic etiology, particularly cardioembolic given positive bubble study     H/o ASD (astium primum) and cleft mitral valve s/p repair. Bubble study now positive     Episode of L face sensory disturbance + possible disorientation, Nov 2024. Unclear if possible TIA vs migraine with new sensory aura     -Continue ASA 81mg daily  -Goal LDL <100; will plan to recheck at next visit  -Goal blood pressure is <130/80 with tighter control associated with improved vascular outcomes  -Complete GERMÁN and follow up with cardiology as scheduled    - Return in about 3 months (around 5/26/2025) for stroke, with JESSICA Gifford only, 60 min.     Stroke Education provided.  He will call us with any questions.  For any acute neurologic deficits he was advised to  go directly to the hospital rather than call the clinic.      Rica Gifford, DEBBIE [facility]  Neurology  02/26/2025         ____________________________________________________________________    Billing:  Please note: for coding purposes this visit should be billed only as established and not new, since this patient was seen by my same subspecialty team (ealth Vascular Neurology) during the hospitalization that this visit is a follow up for.

## 2025-02-26 ENCOUNTER — OFFICE VISIT (OUTPATIENT)
Dept: NEUROLOGY | Facility: CLINIC | Age: 35
End: 2025-02-26
Payer: COMMERCIAL

## 2025-02-26 ENCOUNTER — TELEPHONE (OUTPATIENT)
Dept: NEUROLOGY | Facility: CLINIC | Age: 35
End: 2025-02-26

## 2025-02-26 VITALS — OXYGEN SATURATION: 100 % | HEART RATE: 76 BPM | DIASTOLIC BLOOD PRESSURE: 90 MMHG | SYSTOLIC BLOOD PRESSURE: 130 MMHG

## 2025-02-26 DIAGNOSIS — H34.232 BRANCH RETINAL ARTERY OCCLUSION OF LEFT EYE: ICD-10-CM

## 2025-02-26 DIAGNOSIS — H34.232 RETINAL ARTERY BRANCH OCCLUSION OF LEFT EYE: Primary | ICD-10-CM

## 2025-02-26 PROCEDURE — 3075F SYST BP GE 130 - 139MM HG: CPT | Performed by: NURSE PRACTITIONER

## 2025-02-26 PROCEDURE — 3080F DIAST BP >= 90 MM HG: CPT | Performed by: NURSE PRACTITIONER

## 2025-02-26 PROCEDURE — 99214 OFFICE O/P EST MOD 30 MIN: CPT | Performed by: NURSE PRACTITIONER

## 2025-02-26 NOTE — TELEPHONE ENCOUNTER
Per follow up requested from provider visit today,     Return in about 3 months (around 5/26/2025) for stroke, with JESSICA Gifford only, 60 min.     NATALEE BELTRÁN, CMA

## 2025-02-26 NOTE — PATIENT INSTRUCTIONS
-Continue aspirin 81mg daily  -Goal LDL <100; will plan to recheck at next visit  -Goal blood pressure is <130/80 with tighter control associated with improved vascular outcomes  -Complete GERMÁN and follow up with cardiology as scheduled  -Follow up with me in 3 months, sooner if needed

## 2025-02-26 NOTE — LETTER
"2/26/2025      Ludwin Massey  [address] [facility] [address]      Dear Colleague,    Thank you for referring your patient, Ludwin Massey, to the [facility] NEUROLOGY CLINICS [facility]. Please see a copy of my visit note below.    __________________________________________________________      [facility]   [phone]  __________________________________________________________         History of Present Illness   Chief Complaint: Patient presents with:  Stroke: Stroke      Ludwin Massey is a 35 year old male presenting for follow-up for L BRAO.     He was hospitalized at [facility] on 1/16/24. Prior to the hospital stay, he had a past medical history of HTN and ASD (astium primum) and cleft mitral valve s/p repair and migraine with visual aura. He presented to the hospital with 3 days of L visual symptoms diagnosed as L BRAO by outpatient eye specialist and referred to the ED. he also noted an episode 2 months prior of left facial sensory change and possibly some mild disorientation.  Also notes significant family history of blood clots including a PE in his mother in her 30s, fatal DVT in his father in his 40s.  Arrived with no evidence of stroke.  TTE with moderately positive bubble study.  It was recommended that he initiate aspirin for stroke prevention.  Given suspicion of possible cardioembolic stroke plan was for him to follow-up outpatient with cardiology regarding newly positive bubble study.    He saw cardiology 2/12/2025 and feel that her stroke was potentially due to paradoxical embolus to residual intracardiac shunt; recommended GERMÁN (scheduled for 2/28) ith further recommendations following.    Today, Ludwin reports he has been doing generally well since discharging from the hospital.  He reports that his vision is \"marginally better.\"  Fortunately the ongoing visual deficit is a very small portion and does not affect his central vision; it is " not affecting any of his daily tasks or activities.  He has followed up with a retinal specialist twice who did reconfirm the diagnosis of BRAO.  He has had some headaches since discharging from the hospital.  He reports that initially this was an achy feeling behind the left eye and to the left temporal region that was quite constant; he was using OTCs consistently to improve symptoms.  However headaches have now gradually improved and he has not needed any OTC analgesics in the past 2 weeks.  He does continue to have a degree of mild headache a couple of days per week on average.  He feels that mental health has been stable.  He denies any new symptoms since discharging from the hospital.    He confirms that he is taking aspirin daily with no concern of side effect such as abnormal bleeding or excessive bruising.  He is checking blood pressure at home about 2-3 times per week with average readings in the 120s/80s.    Modified Brock Scale  Score: 1-No significant disability despite symptoms; able to carry out all usual duties and activities    Stroke Evaluation Summarized:  New results resulted and reviewed by me today are in BOLD below.  I personally reviewed the following neuroimaging studies today and the comments above reflect my own personal interpretation of the images: images: MRI brain and I also showed MRI to the patient myself today.    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, subtle chronic white matter changes    Intracranial Vasculature CTA: No LVO or significant stenosis   Cervical Vasculature CTA: No LVO, significant stenosis or dissection       Echocardiogram LV normal in structure, function and size, normal biatrial size, bubble study moderately positive    EKG/Telemetry Sinus rhythm   Other Testing LLE US: no DVT  RLE US: no DVT     CTV abdomen/pelvis: no evidence of clot     ESR: 6  CRP: <3.00  Hypercoagulable panel:  "antithrombin III, Beta-2 glycoprotein, cardiolipin blair,     Cardiac event monitor: no atrial fibrillation identified     Labs Lab Results   Component Value Date     (H) 01/16/2025    A1C 5.2 01/16/2025    CTROPT 11 01/16/2025    INR 0.99 01/17/2025    INR 0.92 01/16/2025               Home Medications     Current Outpatient Medications   Medication Sig Dispense Refill     acetaminophen (TYLENOL) 325 MG tablet Take 1-3 tablets (325-975 mg) by mouth every 6 hours as needed for mild pain or headaches.       aspirin 81 MG EC tablet Take 1 tablet (81 mg) by mouth daily.       hydrochlorothiazide (HYDRODIURIL) 25 MG tablet Take 1 tablet (25 mg) by mouth daily. 90 tablet 3     ibuprofen (ADVIL/MOTRIN) 200 MG tablet Take 200 mg by mouth every 6 hours as needed for pain.       metoprolol succinate ER (TOPROL XL) 25 MG 24 hr tablet Take 1 tablet (25 mg) by mouth daily. 90 tablet 3     No current facility-administered medications for this visit.            Physical Examination     Physical Exam    Estimated body mass index is 27.41 kg/m  as calculated from the following:    Height as of 1/16/25: 1.778 m (5' 10\").    Weight as of 2/12/25: 86.6 kg (191 lb).    /90   Pulse 76   SpO2 100%        General Exam  General: Sitting up in chair in no acute distress  Pulmonary:  no respiratory distress    Neurologic:  Mental Status:  alert, oriented x 3, follows commands, speech clear and fluent  Cranial Nerves:  visual fields intact, EOMI with normal smooth pursuit, facial sensation intact and symmetric, facial movements symmetric, hearing not formally tested but intact to conversation, palate elevation symmetric and uvula midline, no dysarthria, shoulder shrug strong bilaterally, tongue protrusion midline  Motor:  normal muscle tone and bulk, no abnormal movements, able to move all limbs spontaneously, strength 5/5 throughout upper and lower extremities, no pronator drift  Sensory:  light touch sensation intact and " symmetric throughout upper and lower extremities, no extinction on double simultaneous stimulation   Coordination:  normal finger-to-nose and heel-to-shin bilaterally without dysmetria  Station/Gait:  deferred           Screenings and Questionnaires:     Tobacco:    Tobacco Use      Smoking status: Never      Smokeless tobacco: Never      Sleep Apnea:       Depression:      2/21/2025     4:57 PM 10/18/2023     9:53 AM   PHQ-2 ( 1999 [employer])   Q1: Little interest or pleasure in doing things 0 0   Q2: Feeling down, depressed or hopeless 0 0   PHQ-2 Score 0  0   Q1: Little interest or pleasure in doing things Not at all    Q2: Feeling down, depressed or hopeless Not at all    PHQ-2 Score 0        Patient-reported       Stroke Recovery and Risk Factors:      2/21/2025     5:00 PM   Stroke Questionnaire   Residual effects: Any residual effects from stroke? 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  No other residual effects.   Level of independence: Walking Independent   Level of independence: Eating Independent   Level of independence: Stairs Independent   Level of independence: Dressing Independent   Level of independence: Bathing Independent   Level of independence: Toileting Independent   Incontinence: Bowel? No   Incontinence: Bladder? No   Able to: Use electronics Yes   Able to: Cook or do house chores Yes   Able to: Do own shopping, including online Yes   Able to: Manage own finances Yes   Current therapy: Physical Therapy No   Current therapy: Occupation Therapy No   Current therapy: Speech Therapy No   Current therapy: Other No   Current services: Home Health No, not needed   Medication: How do you take your meds Myself, from individual bottles   Medication: Do you ever miss or forget meds Rarely   Risk Factor: Checking blood pressure at home Yes   Risk Factor: Usual blood pressure numbers between 120/80 to 130/90   Risk Factor:  Checking blood sugar at home No   Risk Factor: Second-hand smoke at home No   Risk Factor: How much caffeine per day Pre-stroke 3 cups of coffee each day.  Now, 1 cup coffee and 1-2 cups green tea   Who completed this questionnaire? The patient independently             Assessment and Plan   L LINN, Jan 2025. He does have HTN, but this is historically well controlled and no evidence of ipsilateral carotid disease--thus elevated concern for embolic etiology, particularly cardioembolic given positive bubble study     H/o ASD (astium primum) and cleft mitral valve s/p repair. Bubble study now positive     Episode of L face sensory disturbance + possible disorientation, Nov 2024. Unclear if possible TIA vs migraine with new sensory aura     -Continue ASA 81mg daily  -Goal LDL <100; will plan to recheck at next visit  -Goal blood pressure is <130/80 with tighter control associated with improved vascular outcomes  -Complete GERMÁN and follow up with cardiology as scheduled    - Return in about 3 months (around 5/26/2025) for stroke, with JESSICA Gifford only, 60 min.     Stroke Education provided.  He will call us with any questions.  For any acute neurologic deficits he was advised to  go directly to the hospital rather than call the clinic.      DEBBIE Martino CNP  Neurology  02/26/2025         ____________________________________________________________________    Billing:  Please note: for coding purposes this visit should be billed only as established and not new, since this patient was seen by my same subspecialty team (ealth Vascular Neurology) during the hospitalization that this visit is a follow up for.            Again, thank you for allowing me to participate in the care of your patient.        Sincerely,        DEBBIE Martino CNP    Electronically signed

## 2025-02-26 NOTE — NURSING NOTE
Symptoms or concerns today: No    Med comments: No    Who the patient is here with today: Self    Jo Durham

## 2025-02-28 ENCOUNTER — HOSPITAL ENCOUNTER (OUTPATIENT)
Facility: CLINIC | Age: 35
Discharge: HOME | End: 2025-02-28
Attending: ANESTHESIOLOGY | Admitting: ANESTHESIOLOGY
Payer: COMMERCIAL

## 2025-02-28 ENCOUNTER — HOSPITAL ENCOUNTER (OUTPATIENT)
Dept: CARDIOLOGY | Facility: CLINIC | Age: 35
Discharge: HOME | End: 2025-02-28
Attending: INTERNAL MEDICINE | Admitting: ANESTHESIOLOGY
Payer: COMMERCIAL

## 2025-02-28 VITALS
OXYGEN SATURATION: 100 % | TEMPERATURE: 98.1 F | BODY MASS INDEX: 27.46 KG/M2 | WEIGHT: 191.8 LBS | SYSTOLIC BLOOD PRESSURE: 131 MMHG | HEIGHT: 70 IN | HEART RATE: 61 BPM | RESPIRATION RATE: 16 BRPM | DIASTOLIC BLOOD PRESSURE: 77 MMHG

## 2025-02-28 DIAGNOSIS — Q23.82 CONGENITAL CLEFT LEAFLET OF MITRAL VALVE: ICD-10-CM

## 2025-02-28 DIAGNOSIS — Z87.74 S/P CLOSURE OF CONGENITAL ATRIAL SEPTAL DEFECT BY PERCUTANEOUS TRANSCATHETER TECHNIQUE: ICD-10-CM

## 2025-02-28 DIAGNOSIS — Q21.20 ASD (ATRIAL SEPTAL DEFECT), OSTIUM PRIMUM: Primary | ICD-10-CM

## 2025-02-28 DIAGNOSIS — Q21.20 ASD (ATRIAL SEPTAL DEFECT), OSTIUM PRIMUM: ICD-10-CM

## 2025-02-28 PROCEDURE — 370N000017 HC ANESTHESIA TECHNICAL FEE, PER MIN

## 2025-02-28 PROCEDURE — 710N000012 HC RECOVERY PHASE 2, PER MINUTE

## 2025-02-28 PROCEDURE — 999N000141 HC STATISTIC PRE-PROCEDURE NURSING ASSESSMENT

## 2025-02-28 PROCEDURE — 93325 DOPPLER ECHO COLOR FLOW MAPG: CPT | Mod: 26 | Performed by: STUDENT IN AN ORGANIZED HEALTH CARE EDUCATION/TRAINING PROGRAM

## 2025-02-28 PROCEDURE — 93315 ECHO TRANSESOPHAGEAL: CPT | Mod: 26 | Performed by: STUDENT IN AN ORGANIZED HEALTH CARE EDUCATION/TRAINING PROGRAM

## 2025-02-28 PROCEDURE — 710N000010 HC RECOVERY PHASE 1, LEVEL 2, PER MIN

## 2025-02-28 PROCEDURE — 93320 DOPPLER ECHO COMPLETE: CPT | Mod: 26 | Performed by: STUDENT IN AN ORGANIZED HEALTH CARE EDUCATION/TRAINING PROGRAM

## 2025-02-28 PROCEDURE — 93325 DOPPLER ECHO COLOR FLOW MAPG: CPT

## 2025-02-28 RX ORDER — MAGNESIUM HYDROXIDE/ALUMINUM HYDROXICE/SIMETHICONE 120; 1200; 1200 MG/30ML; MG/30ML; MG/30ML
30 SUSPENSION ORAL EVERY 8 HOURS PRN
Status: DISCONTINUED | OUTPATIENT
Start: 2025-02-28 | End: 2025-02-28 | Stop reason: HOSPADM

## 2025-02-28 RX ORDER — LIDOCAINE 40 MG/G
CREAM TOPICAL
Status: DISCONTINUED | OUTPATIENT
Start: 2025-02-28 | End: 2025-02-28 | Stop reason: HOSPADM

## 2025-02-28 RX ORDER — ONDANSETRON 4 MG/1
4 TABLET, ORALLY DISINTEGRATING ORAL EVERY 30 MIN PRN
Status: DISCONTINUED | OUTPATIENT
Start: 2025-02-28 | End: 2025-02-28 | Stop reason: HOSPADM

## 2025-02-28 RX ORDER — SODIUM CHLORIDE, SODIUM LACTATE, POTASSIUM CHLORIDE, CALCIUM CHLORIDE 600; 310; 30; 20 MG/100ML; MG/100ML; MG/100ML; MG/100ML
INJECTION, SOLUTION INTRAVENOUS CONTINUOUS
Status: DISCONTINUED | OUTPATIENT
Start: 2025-02-28 | End: 2025-02-28 | Stop reason: HOSPADM

## 2025-02-28 RX ORDER — ONDANSETRON 2 MG/ML
4 INJECTION INTRAMUSCULAR; INTRAVENOUS EVERY 30 MIN PRN
Status: DISCONTINUED | OUTPATIENT
Start: 2025-02-28 | End: 2025-02-28 | Stop reason: HOSPADM

## 2025-02-28 RX ORDER — ACETAMINOPHEN 325 MG/1
650 TABLET ORAL EVERY 4 HOURS PRN
Status: DISCONTINUED | OUTPATIENT
Start: 2025-02-28 | End: 2025-02-28 | Stop reason: HOSPADM

## 2025-02-28 RX ORDER — ACETAMINOPHEN 325 MG/1
975 TABLET ORAL ONCE
Status: DISCONTINUED | OUTPATIENT
Start: 2025-02-28 | End: 2025-02-28 | Stop reason: HOSPADM

## 2025-02-28 RX ORDER — DEXAMETHASONE SODIUM PHOSPHATE 4 MG/ML
4 INJECTION, SOLUTION INTRA-ARTICULAR; INTRALESIONAL; INTRAMUSCULAR; INTRAVENOUS; SOFT TISSUE
Status: DISCONTINUED | OUTPATIENT
Start: 2025-02-28 | End: 2025-02-28 | Stop reason: HOSPADM

## 2025-02-28 RX ORDER — SODIUM CHLORIDE 9 MG/ML
1000 INJECTION, SOLUTION INTRAVENOUS CONTINUOUS
Status: DISCONTINUED | OUTPATIENT
Start: 2025-02-28 | End: 2025-02-28 | Stop reason: HOSPADM

## 2025-02-28 RX ORDER — ACYCLOVIR 200 MG/1
9.5 CAPSULE ORAL
Status: DISCONTINUED | OUTPATIENT
Start: 2025-02-28 | End: 2025-02-28 | Stop reason: HOSPADM

## 2025-02-28 RX ORDER — SODIUM CHLORIDE 9 MG/ML
30 INJECTION, SOLUTION INTRAVENOUS CONTINUOUS
Status: DISCONTINUED | OUTPATIENT
Start: 2025-02-28 | End: 2025-02-28 | Stop reason: HOSPADM

## 2025-02-28 RX ORDER — NALOXONE HYDROCHLORIDE 0.4 MG/ML
0.1 INJECTION, SOLUTION INTRAMUSCULAR; INTRAVENOUS; SUBCUTANEOUS
Status: DISCONTINUED | OUTPATIENT
Start: 2025-02-28 | End: 2025-02-28 | Stop reason: HOSPADM

## 2025-02-28 ASSESSMENT — ACTIVITIES OF DAILY LIVING (ADL)
ADLS_ACUITY_SCORE: 32
ADLS_ACUITY_SCORE: 33

## 2025-02-28 NOTE — PROCEDURES
GERMÁN performed. Please see report for full details. No complications. Probe removed without difficulty and returned to decontamination.    Dr. NAEEM Noble  Adult Congenital Heart Disease

## 2025-03-12 NOTE — PROGRESS NOTES
"CARDIOLOGY CONSULTATION:    Mr. Massey is a very pleasant 35-year-old gentleman with a past medical history significant for primary atrial septal defect and cleft mitral valve diagnosed at 13 years of age.  He had surgery at the [facility] and was followed by Dr. Wang originally.      When I met Mr. Massey for the first time in 11/2017, his symptoms were primarily palpitations and those improved with stopping alcohol use. 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  He has a 7 year old daughter and 2.5 year old son and both have structurally normal hearts.  He has a family history of blood clots with a PE in his mom and fatal DVT in his dad at 30 and 40 respectively.      He was admitted 1/2025 to [facility] with three days of left visual symptoms that he apparently was told by an eye specialist was L branch retinal artery occlusion (confirmed by retinal specialist).  He was found in the ER on CTA to have no significant vascular disease.  They reported a tiny chronic lateral left cerebellar lesion.  MRI was done that showed nonspecific scattered foci in the cerebellar white matter that \"could be seen in the setting of migraines\". U/S of LE showed no DVT and CT of iliac/femoral veins showed no clot. TTE showed only on Valsalva a small amount of bubbles crossing.  He then had a GERMÁN 2/28/25 and that was not able to be completed with Valsalva but no obvious septal defect was seen.  He has no arrhythmias and no clot was seen on GERMÁN.  He is now being seen in follow up.   and normal A1C 1/2025.  Clotting panel was negative. He has seen neurology in follow up from the hospital (2/2025) and with the L branch retinal artery occlusion they had elevated concern for embolic etiology of his symptoms and will see him in 3 months in follow up after cardiologic workup and care is complete.      PAST MEDICAL HISTORY:  Past Medical History:   Diagnosis Date    ASD (atrial septal defect), " ostium primum     Cleft leaflet of mitral valve     Migraine     Stroke, embolic (H)        CURRENT MEDICATIONS:  Current Outpatient Medications   Medication Sig Dispense Refill    acetaminophen (TYLENOL) 325 MG tablet Take 1-3 tablets (325-975 mg) by mouth every 6 hours as needed for mild pain or headaches.      aspirin 81 MG EC tablet Take 1 tablet (81 mg) by mouth daily.      hydrochlorothiazide (HYDRODIURIL) 25 MG tablet Take 1 tablet (25 mg) by mouth daily. 90 tablet 3    ibuprofen (ADVIL/MOTRIN) 200 MG tablet Take 200 mg by mouth every 6 hours as needed for pain.      metoprolol succinate ER (TOPROL XL) 25 MG 24 hr tablet Take 1 tablet (25 mg) by mouth daily. 90 tablet 3       PAST SURGICAL HISTORY:  Past Surgical History:   Procedure Laterality Date    ASD REPAIR, OSTIUM PRIMUM  2017    UMN    VASECTOMY         ALLERGIES  Patient has no known allergies.    FAMILY HX:  Family History   Problem Relation Age of Onset    Hypertension Father     Thrombosis Father 42        DVT    Other - See Comments Father         WPW    Thrombosis Mother     Colon Cancer Maternal Grandfather     Migraines Paternal Grandmother     Dementia Paternal Grandfather     Hypertension Brother     Other - See Comments Paternal Aunt         Mitral valve prolapse       SOCIAL HX:  Social History     Socioeconomic History    Marital status:    Tobacco Use    Smoking status: Never    Smokeless tobacco: Never   Substance and Sexual Activity    Alcohol use: Yes     Comment: very rare    Drug use: No    Sexual activity: Yes     Partners: Female   Other Topics Concern    Parent/sibling w/ CABG, MI or angioplasty before 65F 55M? No     Social Drivers of Health     Interpersonal Safety: Low Risk  (2/28/2025)    Interpersonal Safety     Do you feel physically and emotionally safe where you currently live?: Yes     Within the past 12 months, have you been hit, slapped, kicked or otherwise physically hurt by someone?: No     Within the past 12  "months, have you been humiliated or emotionally abused in other ways by your partner or ex-partner?: No       ROS:  Constitutional: No fever, chills, or sweats. No weight gain/loss.   ENT: No visual disturbance, ear ache, epistaxis, sore throat.   Allergies/Immunologic: Negative.   Respiratory: No cough, hemoptysis.   Cardiovascular: As per HPI.   GI: No nausea, vomiting, hematemesis, melena, or hematochezia.   : No urinary frequency, dysuria, or hematuria.   Integument: Negative.   Psychiatric: Negative.   Neuro: Negative.   Endocrinology: Negative.   Musculoskeletal: No myalgia.    VITAL SIGNS:  /84   Pulse 80   Ht 1.778 m (5' 10\")   Wt 88.9 kg (196 lb)   SpO2 99%   BMI 28.12 kg/m    Body mass index is 28.12 kg/m .  Wt Readings from Last 2 Encounters:   03/13/25 88.9 kg (196 lb)   02/28/25 87 kg (191 lb 12.8 oz)       PHYSICAL EXAM  Ludwin Massey IS A 35 year old male.in no acute distress.  HEENT: Unremarkable.  Neck: JVP normal.   Lungs: CTA.  Cor: RRR. Normal S1 and S2.  No murmur, rub, or gallop.  PMI in Lf 5th ICS.  Abd: Soft  Extremities: No C/C/E.  Neuro: Grossly intact.    LABS    Lab Results   Component Value Date    WBC 6.1 01/17/2025    WBC 7.2 12/12/2019     Lab Results   Component Value Date    RBC 4.85 01/17/2025    RBC 4.82 12/12/2019     Lab Results   Component Value Date    HGB 15.3 01/17/2025    HGB 15.4 12/12/2019     Lab Results   Component Value Date    HCT 45.6 01/17/2025    HCT 44.9 12/12/2019     No components found for: \"MCT\"  Lab Results   Component Value Date    MCV 94 01/17/2025    MCV 93 12/12/2019     Lab Results   Component Value Date    MCH 31.5 01/17/2025    MCH 32.0 12/12/2019     Lab Results   Component Value Date    MCHC 33.6 01/17/2025    MCHC 34.3 12/12/2019     Lab Results   Component Value Date    RDW 11.9 01/17/2025    RDW 12.3 12/12/2019     Lab Results   Component Value Date     01/17/2025     12/12/2019      Recent Labs   Lab Test 01/17/25  0633 " 01/16/25  1243    139   POTASSIUM 4.0 3.8   CHLORIDE 101 100   CO2 31* 31*   ANIONGAP 7 8   GLC 95 116*   BUN 13.4 10.6   CR 1.05 0.98   BHUPENDRA 9.3 10.3     Recent Labs   Lab Test 01/16/25  1528 07/13/23  0819   CHOL 184 147   HDL 54 64   * 70   TRIG 131 65   NHDL 130* 83        IMPRESSION/PLAN:   1.  Status post repair of primary atrial septal defect and cleft mitral valve 06/2003 at [facility].  We do not have this operative report.   2.  Mild mitral and tricuspid insufficiency, stable.   3.  Hypertension controlled  4.  Left retinal artery branch occlusion 1/2025  5.  Positive TTE with valsalva     DISCUSSION:  It was a pleasure to see Mr. Massey in followup.  .Discussed today his interval history since he was last seen and I reviewed his echo and GERMÁN images with him and his wife.  We discussed that there were bubbles that crossed with Valsalva at the atrial level, but it was not clear whether from a PFO or a defect in the ASD patch.  On the GERMÁN they were not able to do Valsalva and did not see any bubbles cross and no definitive PFO.  I discussed with them options for evaluation, and after discussion of risks and benefits will go ahead with scheduling a right and left heart cath with ICE imaging and possible ASD versus PFO closure.  If the closure is needed and straight forward, he should be able to go home the same day.  Would treat with Plavix for 3 months after along with baby asa and then just continue the baby ASA he is on after that.  Will schedule him in  the next few months.       He understands and is in agreement with the plan as outlined.  All questions were answered.        It was a pleasure to see him.  Please do not hesitate to contact me with any questions or concerns.      ETHAN ANDREWS MD    32 minutes face-face, documentation and review of records on day of visit     The longitudinal plan of care for the diagnosis(es)/condition(s) as documented were addressed  during this visit. Due to the added complexity in care, I will continue to support Ludwin in the subsequent management and with ongoing continuity of care.

## 2025-03-13 ENCOUNTER — MYC MEDICAL ADVICE (OUTPATIENT)
Dept: CARDIOLOGY | Facility: CLINIC | Age: 35
End: 2025-03-13

## 2025-03-13 ENCOUNTER — OFFICE VISIT (OUTPATIENT)
Dept: CARDIOLOGY | Facility: CLINIC | Age: 35
End: 2025-03-13
Payer: COMMERCIAL

## 2025-03-13 VITALS
OXYGEN SATURATION: 99 % | SYSTOLIC BLOOD PRESSURE: 126 MMHG | HEART RATE: 80 BPM | WEIGHT: 196 LBS | HEIGHT: 70 IN | DIASTOLIC BLOOD PRESSURE: 84 MMHG | BODY MASS INDEX: 28.06 KG/M2

## 2025-03-13 DIAGNOSIS — Z87.74 S/P CLOSURE OF CONGENITAL ATRIAL SEPTAL DEFECT BY PERCUTANEOUS TRANSCATHETER TECHNIQUE: ICD-10-CM

## 2025-03-13 DIAGNOSIS — Q21.20 ASD (ATRIAL SEPTAL DEFECT), OSTIUM PRIMUM: ICD-10-CM

## 2025-03-13 DIAGNOSIS — Q24.9 ADULT CONGENITAL HEART DISEASE: Primary | ICD-10-CM

## 2025-03-13 DIAGNOSIS — Q24.9 ADULT CONGENITAL HEART DISEASE: ICD-10-CM

## 2025-03-13 DIAGNOSIS — Q21.10 RESIDUAL ASD (ATRIAL SEPTAL DEFECT) FOLLOWING REPAIR: ICD-10-CM

## 2025-03-13 RX ORDER — POTASSIUM CHLORIDE 1500 MG/1
40 TABLET, EXTENDED RELEASE ORAL
OUTPATIENT
Start: 2025-03-13

## 2025-03-13 RX ORDER — ASPIRIN 81 MG/1
243 TABLET, CHEWABLE ORAL ONCE
OUTPATIENT
Start: 2025-03-13

## 2025-03-13 RX ORDER — ASPIRIN 325 MG
325 TABLET ORAL ONCE
OUTPATIENT
Start: 2025-03-13 | End: 2025-03-13

## 2025-03-13 RX ORDER — SODIUM CHLORIDE 9 MG/ML
INJECTION, SOLUTION INTRAVENOUS CONTINUOUS
OUTPATIENT
Start: 2025-03-13

## 2025-03-13 RX ORDER — LIDOCAINE 40 MG/G
CREAM TOPICAL
OUTPATIENT
Start: 2025-03-13

## 2025-03-13 RX ORDER — POTASSIUM CHLORIDE 1500 MG/1
20 TABLET, EXTENDED RELEASE ORAL
OUTPATIENT
Start: 2025-03-13

## 2025-03-13 NOTE — PATIENT INSTRUCTIONS
"Thank you for visiting the Adult Congenital and Cardiovascular Genetics Clinic at the [facility].    Cardiology Providers you saw during your visit:  NEGRITA Rojo MD    Diagnosis:  ASD    Results:  NEGRITA Rojo MD reviewed the results of your GERMÁN testing today in clinic.    If you have questions or concerns, please call us at [phone] or contact us through [email or web address].  ______________________________________________________________________________    Recommendations from your Cardiology Provider TODAY:    We will schedule you for the heart cath/potential ASD closure on 5/19/2025, I will update you with more information when it's officially scheduled  You can see the dentist prior to the procedure. You do not need antibiotics.       ____________________________________________________________________________________________________    Follow-up Plan:  Follow up with  March 1 month after ASD closure with an echocardiogram prior    ____________________________________________________________________________________________________    If you have questions or concerns, please call us at [phone] or contact us through [employer]. Our fax number is [phone]    Nidhi Cruz RN RN, BSN   Susie Ingram (Scheduling)  Nurse Care Coordinator     Clinic   Adult Congenital and CV Genetics              Adult Congenital and CV Genetics  [facility]              [facility]        For after hours urgent needs, call [phone] and ask to speak to the \"On-Call Cardiologist.\"    For emergencies call 911.      ____________________________________________________________________________________________________    Additional Important Information for Your Heart Health      General Cardiac Recommendations:  Continue to eat a heart healthy, low salt diet.  Continue to get 20-30 minutes of aerobic activity, 4-5 days per week.  Examples of " aerobic activity include walking, running, swimming, cycling, etc.  Continue to observe good oral hygiene, with regular dental visits.        SBE prophylaxis (antibiotics needed before dental appointments):   Yes____  No__X__    SBE prophylaxis applies to patients with certain heart conditions who are recommended to take antibiotics before dental appointments and other specific procedures. These antibiotics are to help prevent an infection of the heart (endocarditis) that certain patients are at higher risk of developing. The guidelines used come from the American Heart Association and are periodically updated.        FASTING CHOLESTEROL was checked in the last 5 years YES__X__  NO____ (2025)  If no, please follow up with your primary care physician. You should have a cholesterol screening every 5 years at minimum, and every year if taking a medication for your cholesterol levels.

## 2025-03-13 NOTE — LETTER
"3/13/2025    Tamiko Flannery, APRN CNP  [address]  [facility] [address]    RE: Ludwin Massey       Dear Colleague,     I had the pleasure of seeing Ludwin Massey in the [facility].  CARDIOLOGY CONSULTATION:    Mr. Massey is a very pleasant 35-year-old gentleman with a past medical history significant for primary atrial septal defect and cleft mitral valve diagnosed at 13 years of age.  He had surgery at the [facility] and was followed by Dr. Wang originally.      When I met Mr. Massey for the first time in 11/2017, his symptoms were primarily palpitations and those improved with stopping alcohol use. 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  He has a 7 year old daughter and 2.5 year old son and both have structurally normal hearts.  He has a family history of blood clots with a PE in his mom and fatal DVT in his dad at 30 and 40 respectively.      He was admitted 1/2025 to [facility] with three days of left visual symptoms that he apparently was told by an eye specialist was L branch retinal artery occlusion (confirmed by retinal specialist).  He was found in the ER on CTA to have no significant vascular disease.  They reported a tiny chronic lateral left cerebellar lesion.  MRI was done that showed nonspecific scattered foci in the cerebellar white matter that \"could be seen in the setting of migraines\". U/S of LE showed no DVT and CT of iliac/femoral veins showed no clot. TTE showed only on Valsalva a small amount of bubbles crossing.  He then had a GERMÁN 2/28/25 and that was not able to be completed with Valsalva but no obvious septal defect was seen.  He has no arrhythmias and no clot was seen on GERMÁN.  He is now being seen in follow up.   and normal A1C 1/2025.  Clotting panel was negative. He has seen neurology in follow up from the hospital (2/2025) and with the L branch retinal artery occlusion they had elevated concern for " embolic etiology of his symptoms and will see him in 3 months in follow up after cardiologic workup and care is complete.      PAST MEDICAL HISTORY:  Past Medical History:   Diagnosis Date     ASD (atrial septal defect), ostium primum      Cleft leaflet of mitral valve      Migraine      Stroke, embolic (H)        CURRENT MEDICATIONS:  Current Outpatient Medications   Medication Sig Dispense Refill     acetaminophen (TYLENOL) 325 MG tablet Take 1-3 tablets (325-975 mg) by mouth every 6 hours as needed for mild pain or headaches.       aspirin 81 MG EC tablet Take 1 tablet (81 mg) by mouth daily.       hydrochlorothiazide (HYDRODIURIL) 25 MG tablet Take 1 tablet (25 mg) by mouth daily. 90 tablet 3     ibuprofen (ADVIL/MOTRIN) 200 MG tablet Take 200 mg by mouth every 6 hours as needed for pain.       metoprolol succinate ER (TOPROL XL) 25 MG 24 hr tablet Take 1 tablet (25 mg) by mouth daily. 90 tablet 3       PAST SURGICAL HISTORY:  Past Surgical History:   Procedure Laterality Date     ASD REPAIR, OSTIUM PRIMUM  2017    UMN     VASECTOMY         ALLERGIES  Patient has no known allergies.    FAMILY HX:  Family History   Problem Relation Age of Onset     Hypertension Father      Thrombosis Father 42        DVT     Other - See Comments Father         WPW     Thrombosis Mother      Colon Cancer Maternal Grandfather      Migraines Paternal Grandmother      Dementia Paternal Grandfather      Hypertension Brother      Other - See Comments Paternal Aunt         Mitral valve prolapse       SOCIAL HX:  Social History     Socioeconomic History     Marital status:    Tobacco Use     Smoking status: Never     Smokeless tobacco: Never   Substance and Sexual Activity     Alcohol use: Yes     Comment: very rare     Drug use: No     Sexual activity: Yes     Partners: Female   Other Topics Concern     Parent/sibling w/ CABG, MI or angioplasty before 65F 55M? No     Social Drivers of Health     Interpersonal Safety: Low Risk   "(2/28/2025)    Interpersonal Safety      Do you feel physically and emotionally safe where you currently live?: Yes      Within the past 12 months, have you been hit, slapped, kicked or otherwise physically hurt by someone?: No      Within the past 12 months, have you been humiliated or emotionally abused in other ways by your partner or ex-partner?: No       ROS:  Constitutional: No fever, chills, or sweats. No weight gain/loss.   ENT: No visual disturbance, ear ache, epistaxis, sore throat.   Allergies/Immunologic: Negative.   Respiratory: No cough, hemoptysis.   Cardiovascular: As per HPI.   GI: No nausea, vomiting, hematemesis, melena, or hematochezia.   : No urinary frequency, dysuria, or hematuria.   Integument: Negative.   Psychiatric: Negative.   Neuro: Negative.   Endocrinology: Negative.   Musculoskeletal: No myalgia.    VITAL SIGNS:  /84   Pulse 80   Ht 1.778 m (5' 10\")   Wt 88.9 kg (196 lb)   SpO2 99%   BMI 28.12 kg/m    Body mass index is 28.12 kg/m .  Wt Readings from Last 2 Encounters:   03/13/25 88.9 kg (196 lb)   02/28/25 87 kg (191 lb 12.8 oz)       PHYSICAL EXAM  Ludwin Massey IS A 35 year old male.in no acute distress.  HEENT: Unremarkable.  Neck: JVP normal.   Lungs: CTA.  Cor: RRR. Normal S1 and S2.  No murmur, rub, or gallop.  PMI in Lf 5th ICS.  Abd: Soft  Extremities: No C/C/E.  Neuro: Grossly intact.    LABS    Lab Results   Component Value Date    WBC 6.1 01/17/2025    WBC 7.2 12/12/2019     Lab Results   Component Value Date    RBC 4.85 01/17/2025    RBC 4.82 12/12/2019     Lab Results   Component Value Date    HGB 15.3 01/17/2025    HGB 15.4 12/12/2019     Lab Results   Component Value Date    HCT 45.6 01/17/2025    HCT 44.9 12/12/2019     No components found for: \"MCT\"  Lab Results   Component Value Date    MCV 94 01/17/2025    MCV 93 12/12/2019     Lab Results   Component Value Date    MCH 31.5 01/17/2025    MCH 32.0 12/12/2019     Lab Results   Component Value Date    " MCHC 33.6 01/17/2025    MCHC 34.3 12/12/2019     Lab Results   Component Value Date    RDW 11.9 01/17/2025    RDW 12.3 12/12/2019     Lab Results   Component Value Date     01/17/2025     12/12/2019      Recent Labs   Lab Test 01/17/25  0633 01/16/25  1243    139   POTASSIUM 4.0 3.8   CHLORIDE 101 100   CO2 31* 31*   ANIONGAP 7 8   GLC 95 116*   BUN 13.4 10.6   CR 1.05 0.98   BHUPENDRA 9.3 10.3     Recent Labs   Lab Test 01/16/25  1528 07/13/23  0819   CHOL 184 147   HDL 54 64   * 70   TRIG 131 65   NHDL 130* 83        IMPRESSION/PLAN:   1.  Status post repair of primary atrial septal defect and cleft mitral valve 06/2003 at [facility].  We do not have this operative report.   2.  Mild mitral and tricuspid insufficiency, stable.   3.  Hypertension controlled  4.  Left retinal artery branch occlusion 1/2025  5.  Positive TTE with valsalva     DISCUSSION:  It was a pleasure to see Mr. Massey in followup.  .Discussed today his interval history since he was last seen and I reviewed his echo and GERMÁN images with him and his wife.  We discussed that there were bubbles that crossed with Valsalva at the atrial level, but it was not clear whether from a PFO or a defect in the ASD patch.  On the GERMÁN they were not able to do Valsalva and did not see any bubbles cross and no definitive PFO.  I discussed with them options for evaluation, and after discussion of risks and benefits will go ahead with scheduling a right and left heart cath with ICE imaging and possible ASD versus PFO closure.  If the closure is needed and straight forward, he should be able to go home the same day.  Would treat with Plavix for 3 months after along with baby asa and then just continue the baby ASA he is on after that.  Will schedule him in  the next few months.       He understands and is in agreement with the plan as outlined.  All questions were answered.        It was a pleasure to see him.  Please do not  hesitate to contact me with any questions or concerns.      RUBINA ROJO MD    32 minutes face-face, documentation and review of records on day of visit     The longitudinal plan of care for the diagnosis(es)/condition(s) as documented were addressed during this visit. Due to the added complexity in care, I will continue to support Ludwin in the subsequent management and with ongoing continuity of care.       Thank you for allowing me to participate in the care of your patient.      Sincerely,     Rubina Rojo MD     [facility]  cc:   Rubina Rojo MD  [address]  [place] [address]

## 2025-05-01 NOTE — PROGRESS NOTES
Virtual Visit Details    Type of service:  Video Visit     Originating Location (pt. Location): Home    Distant Location (provider location):  Off-site  Platform used for Video Visit: Mary Lou    __________________________________________________________      [facility]   [phone]  __________________________________________________________           History of Present Illness   Chief Complaint: Patient presents with:  Stroke      Ludwin Massey is a 35 year old male presenting for follow-up for  L BRAO.     He was hospitalized at [facility] on 1/16/24. Prior to the hospital stay, he had a past medical history of HTN and ASD (astium primum) and cleft mitral valve s/p repair and migraine with visual aura. He presented to the hospital with 3 days of L visual symptoms diagnosed as L BRAO by outpatient eye specialist and referred to the ED. he also noted an episode 2 months prior of left facial sensory change and possibly some mild disorientation.  Also notes significant family history of blood clots including a PE in his mother in her 30s, fatal DVT in his father in his 40s.  Arrived with no evidence of stroke.  TTE with moderately positive bubble study.  It was recommended that he initiate aspirin for stroke prevention.   He saw cardiology 2/12/2025 and feel that her stroke was potentially due to paradoxical embolus to residual intracardiac shunt.    Recent stroke follow-up was with myself 2/26/2025.  At that time he reported ongoing visual deficit and a small portion of his central vision, fortunately it was not affecting daily activities or tasks.  It was recommended that he continue aspirin and follow-up for completion of GERMÁN and cardiology as scheduled.    Today, Ludwin reports he has been stable since our most recent visit together.  He continues to have persistent but stable visual deficit near the central vision of the left eye.  He does continue to follow with the  retinal specialist who reports healing on his most recent examination.  He is no longer struggling with headaches.  He denies any new symptoms or concerns.    He continues on aspirin 81 mg daily.  Since his stroke he has made lifestyle modifications including increasing exercise and improving diet with less cholesterol.  He is agreeable to rechecking LDL with next set of labs.    He reports that he is feeling good about the upcoming cardiac procedure.    Modified Brock Scale  Score: 1-No significant disability despite symptoms; able to carry out all usual duties and activities    Stroke Evaluation Summarized:  New results resulted and reviewed by me today are in BOLD below.     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 Multiple agitated saline studies performed from left arm IV. with only 1 showing at moste a few  bubbles crossing to the left atrium. There is small echo density protruding from the inferior aspect of atrial septum on the LA side of the septum likely  ASD patch material. This has been seen on previous transthoracic echoes. A large Eustachian valve is noted in the usual position.There is thickening of  the anterior mitral valve leaflet. There appears to be suture material stuck to the anterior mitral leaflet. Trivial to mild mitral valve insufficiency.The tricuspid valve is normal in appearance and motion.Trivial tricuspid valve insufficiency. There is no ventricular level shunting.The left and right ventricles have normal chamber size, wall thickness, and systolic  "function.No pericardial effusion.   EKG/Telemetry Sinus rhythm   Other Testing LLE US: no DVT  RLE US: no DVT     CTV abdomen/pelvis: no evidence of clot     ESR: 6  CRP: <3.00  Hypercoagulable panel: antithrombin III, Beta-2 glycoprotein, cardiolipin blari,      Cardiac event monitor: no atrial fibrillation identified     Labs Lab Results   Component Value Date     (H) 01/16/2025    A1C 5.2 01/16/2025    CTROPT 11 01/16/2025    INR 0.99 01/17/2025    INR 0.92 01/16/2025                 Home Medications     Current Outpatient Medications   Medication Sig Dispense Refill    aspirin 81 MG EC tablet Take 1 tablet (81 mg) by mouth daily.      hydrochlorothiazide (HYDRODIURIL) 25 MG tablet Take 1 tablet (25 mg) by mouth daily. 90 tablet 3    metoprolol succinate ER (TOPROL XL) 25 MG 24 hr tablet Take 1 tablet (25 mg) by mouth daily. 90 tablet 3     No current facility-administered medications for this visit.            Physical Examination     Vitals:  Vitals - Patient Reported  Systolic (Patient Reported): 120  Diastolic (Patient Reported): 80  Weight (Patient Reported): 86.2 kg (190 lb)  Height (Patient Reported): 177.8 cm (5' 10\")  BMI (Based on Pt Reported Ht/Wt): 27.26  Pain Score: No Pain (0)         BMI Readings from Last 1 Encounters:   03/13/25 28.12 kg/m        Neurologic: Completed via telemedicine video call  Mental Status:  alert, oriented x 3, speech clear and fluent  Cranial Nerves:  EOMI with normal smooth pursuit, facial movements symmetric, hearing not formally tested but intact to conversation, no dysarthria, shoulder shrug equal bilaterally, tongue protrusion midline  Motor:  no abnormal movements, able to move all limbs antigravity spontaneously with no signs of hemiparesis observed         Screenings and Questionnaires:     Tobacco:    Tobacco Use      Smoking status: Never      Smokeless tobacco: Never      Sleep Apnea:       Depression:      5/8/2025    12:19 PM 2/21/2025     4:57 PM   PHQ-2 " ( 1999 [employer])   Q1: Little interest or pleasure in doing things 0 0   Q2: Feeling down, depressed or hopeless 0 0   PHQ-2 Score 0 0    Q1: Little interest or pleasure in doing things  Not at all   Q2: Feeling down, depressed or hopeless  Not at all   PHQ-2 Score  0       Patient-reported       Stroke Recovery and Risk Factors:      2/21/2025     5:00 PM   Stroke Questionnaire   Residual effects: Any residual effects from stroke? 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  No other residual effects.   Level of independence: Walking Independent   Level of independence: Eating Independent   Level of independence: Stairs Independent   Level of independence: Dressing Independent   Level of independence: Bathing Independent   Level of independence: Toileting Independent   Incontinence: Bowel? No   Incontinence: Bladder? No   Able to: Use electronics Yes   Able to: Cook or do house chores Yes   Able to: Do own shopping, including online Yes   Able to: Manage own finances Yes   Current therapy: Physical Therapy No   Current therapy: Occupation Therapy No   Current therapy: Speech Therapy No   Current therapy: Other No   Current services: Home Health No, not needed   Medication: How do you take your meds Myself, from individual bottles   Medication: Do you ever miss or forget meds Rarely   Risk Factor: Checking blood pressure at home Yes   Risk Factor: Usual blood pressure numbers between 120/80 to 130/90   Risk Factor: Checking blood sugar at home No   Risk Factor: Second-hand smoke at home No   Risk Factor: How much caffeine per day Pre-stroke 3 cups of coffee each day.  Now, 1 cup coffee and 1-2 cups green tea   Who completed this questionnaire? The patient independently           Assessment and Plan   DAWSON ESTEVES, Jan 2025. He does have HTN, but this is historically well controlled and no evidence of ipsilateral carotid disease--thus elevated  concern for embolic etiology, particularly cardioembolic given positive bubble study     H/o ASD (astium primum) and cleft mitral valve s/p repair. Bubble study now positive     Episode of L face sensory disturbance + possible disorientation, Nov 2024. Unclear if possible TIA vs migraine with new sensory aura      -Continue ASA 81mg daily indefinitely for stroke prevention (additional antiplatelets as indicated per cardiology--anticipate 90 days of Plavix following upcoming cardiac procedure)   -Goal LDL <100; recheck LDL. If above goal would consider low dose statin   -Goal blood pressure is <130/80 with tighter control associated with improved vascular outcomes  -Heart Cath w/ ICE imaging and possible ASD vs PFO closure as scheduled on 5/19     - Return in about 8 months (around 1/8/2026) for stroke, with JESSICA Gifford only.     Stroke Education provided.  He will call us with any questions.  For any acute neurologic deficits he was advised to  go directly to the hospital rather than call the clinic.      Rica Gifford, [facility]  Neurology  05/08/2025         ____________________________________________________________________    Billing:  Please note: for coding purposes this visit should be billed only as established and not new, since this patient was seen by my same subspecialty team ([facility]) during the hospitalization that this visit is a follow up for.  I spent a total of 22 minutes on the day of the visit.   Time spent by me today doing chart review, history and exam, documentation and further activities per the note

## 2025-05-02 ENCOUNTER — TRANSFERRED RECORDS (OUTPATIENT)
Dept: HEALTH INFORMATION MANAGEMENT | Facility: CLINIC | Age: 35
End: 2025-05-02
Payer: COMMERCIAL

## 2025-05-08 ENCOUNTER — VIRTUAL VISIT (OUTPATIENT)
Dept: NEUROLOGY | Facility: CLINIC | Age: 35
End: 2025-05-08
Payer: COMMERCIAL

## 2025-05-08 DIAGNOSIS — H34.232 BRANCH RETINAL ARTERY OCCLUSION OF LEFT EYE: Primary | ICD-10-CM

## 2025-05-08 NOTE — LETTER
5/8/2025      Ludwin Massey  [address] [facility] [id number]      Dear Colleague,    Thank you for referring your patient, Ludwin Massey, to the [facility] NEUROLOGY [facility]. Please see a copy of my visit note below.    Virtual Visit Details    Type of service:  Video Visit     Originating Location (pt. Location): Home    Distant Location (provider location):  Off-site  Platform used for Video Visit: Veeker    __________________________________________________________      [facility]   [phone]  __________________________________________________________           History of Present Illness   Chief Complaint: Patient presents with:  Stroke      Ludwin Massey is a 35 year old male presenting for follow-up for  L BRAO.     He was hospitalized at [facility] on 1/16/24. Prior to the hospital stay, he had a past medical history of HTN and ASD (astium primum) and cleft mitral valve s/p repair and migraine with visual aura. He presented to the hospital with 3 days of L visual symptoms diagnosed as L BRAO by outpatient eye specialist and referred to the ED. he also noted an episode 2 months prior of left facial sensory change and possibly some mild disorientation.  Also notes significant family history of blood clots including a PE in his mother in her 30s, fatal DVT in his father in his 40s.  Arrived with no evidence of stroke.  TTE with moderately positive bubble study.  It was recommended that he initiate aspirin for stroke prevention.   He saw cardiology 2/12/2025 and feel that her stroke was potentially due to paradoxical embolus to residual intracardiac shunt.    Recent stroke follow-up was with myself 2/26/2025.  At that time he reported ongoing visual deficit and a small portion of his central vision, fortunately it was not affecting daily activities or tasks.  It was recommended that he continue aspirin and follow-up for completion of GERMÁN and  cardiology as scheduled.    Today, Ludwin reports he has been stable since our most recent visit together.  He continues to have persistent but stable visual deficit near the central vision of the left eye.  He does continue to follow with the retinal specialist who reports healing on his most recent examination.  He is no longer struggling with headaches.  He denies any new symptoms or concerns.    He continues on aspirin 81 mg daily.  Since his stroke he has made lifestyle modifications including increasing exercise and improving diet with less cholesterol.  He is agreeable to rechecking LDL with next set of labs.    He reports that he is feeling good about the upcoming cardiac procedure.    Modified [place] Scale  Score: 1-No significant disability despite symptoms; able to carry out all usual duties and activities    Stroke Evaluation Summarized:  New results resulted and reviewed by me today are in BOLD below.     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 Multiple agitated saline studies performed from left arm IV. with only 1 showing at moste a few  bubbles crossing to the left atrium. There is small echo density protruding from the inferior aspect of atrial septum on the LA side of the septum likely  ASD patch material. This has been seen on previous transthoracic echoes. A large Eustachian valve is noted in the usual position.There is thickening of  the anterior mitral valve leaflet. There appears to be suture material stuck to the anterior mitral leaflet. Trivial to mild  "mitral valve insufficiency.The tricuspid valve is normal in appearance and motion.Trivial tricuspid valve insufficiency. There is no ventricular level shunting.The left and right ventricles have normal chamber size, wall thickness, and systolic function.No pericardial effusion.   EKG/Telemetry Sinus rhythm   Other Testing LLE US: no DVT  RLE US: no DVT     CTV abdomen/pelvis: no evidence of clot     ESR: 6  CRP: <3.00  Hypercoagulable panel: antithrombin III, Beta-2 glycoprotein, cardiolipin blair,      Cardiac event monitor: no atrial fibrillation identified     Labs Lab Results   Component Value Date     (H) 01/16/2025    A1C 5.2 01/16/2025    CTROPT 11 01/16/2025    INR 0.99 01/17/2025    INR 0.92 01/16/2025                 Home Medications     Current Outpatient Medications   Medication Sig Dispense Refill     aspirin 81 MG EC tablet Take 1 tablet (81 mg) by mouth daily.       hydrochlorothiazide (HYDRODIURIL) 25 MG tablet Take 1 tablet (25 mg) by mouth daily. 90 tablet 3     metoprolol succinate ER (TOPROL XL) 25 MG 24 hr tablet Take 1 tablet (25 mg) by mouth daily. 90 tablet 3     No current facility-administered medications for this visit.            Physical Examination     Vitals:  Vitals - Patient Reported  Systolic (Patient Reported): 120  Diastolic (Patient Reported): 80  Weight (Patient Reported): 86.2 kg (190 lb)  Height (Patient Reported): 177.8 cm (5' 10\")  BMI (Based on Pt Reported Ht/Wt): 27.26  Pain Score: No Pain (0)         BMI Readings from Last 1 Encounters:   03/13/25 28.12 kg/m        Neurologic: Completed via telemedicine video call  Mental Status:  alert, oriented x 3, speech clear and fluent  Cranial Nerves:  EOMI with normal smooth pursuit, facial movements symmetric, hearing not formally tested but intact to conversation, no dysarthria, shoulder shrug equal bilaterally, tongue protrusion midline  Motor:  no abnormal movements, able to move all limbs antigravity spontaneously with " no signs of hemiparesis observed         Screenings and Questionnaires:     Tobacco:    Tobacco Use      Smoking status: Never      Smokeless tobacco: Never      Sleep Apnea:       Depression:      5/8/2025    12:19 PM 2/21/2025     4:57 PM   PHQ-2 ( 1999 [employer])   Q1: Little interest or pleasure in doing things 0 0   Q2: Feeling down, depressed or hopeless 0 0   PHQ-2 Score 0 0    Q1: Little interest or pleasure in doing things  Not at all   Q2: Feeling down, depressed or hopeless  Not at all   PHQ-2 Score  0       Patient-reported       Stroke Recovery and Risk Factors:      2/21/2025     5:00 PM   Stroke Questionnaire   Residual effects: Any residual effects from stroke? 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  No other residual effects.   Level of independence: Walking Independent   Level of independence: Eating Independent   Level of independence: Stairs Independent   Level of independence: Dressing Independent   Level of independence: Bathing Independent   Level of independence: Toileting Independent   Incontinence: Bowel? No   Incontinence: Bladder? No   Able to: Use electronics Yes   Able to: Cook or do house chores Yes   Able to: Do own shopping, including online Yes   Able to: Manage own finances Yes   Current therapy: Physical Therapy No   Current therapy: Occupation Therapy No   Current therapy: Speech Therapy No   Current therapy: Other No   Current services: Home Health No, not needed   Medication: How do you take your meds Myself, from individual bottles   Medication: Do you ever miss or forget meds Rarely   Risk Factor: Checking blood pressure at home Yes   Risk Factor: Usual blood pressure numbers between 120/80 to 130/90   Risk Factor: Checking blood sugar at home No   Risk Factor: Second-hand smoke at home No   Risk Factor: How much caffeine per day Pre-stroke 3 cups of coffee each day.  Now, 1 cup coffee and 1-2  cups green tea   Who completed this questionnaire? The patient independently           Assessment and Plan   L BRAO, Jan 2025. He does have HTN, but this is historically well controlled and no evidence of ipsilateral carotid disease--thus elevated concern for embolic etiology, particularly cardioembolic given positive bubble study     H/o ASD (astium primum) and cleft mitral valve s/p repair. Bubble study now positive     Episode of L face sensory disturbance + possible disorientation, Nov 2024. Unclear if possible TIA vs migraine with new sensory aura      -Continue ASA 81mg daily indefinitely for stroke prevention (additional antiplatelets as indicated per cardiology--anticipate 90 days of Plavix following upcoming cardiac procedure)   -Goal LDL <100; recheck LDL. If above goal would consider low dose statin   -Goal blood pressure is <130/80 with tighter control associated with improved vascular outcomes  -Heart Cath w/ ICE imaging and possible ASD vs PFO closure as scheduled on 5/19     - Return in about 8 months (around 1/8/2026) for stroke, with JESSICA Gifford only.     Stroke Education provided.  He will call us with any questions.  For any acute neurologic deficits he was advised to  go directly to the hospital rather than call the clinic.      DEBBIE Martino [facility]  Neurology  05/08/2025         ____________________________________________________________________    Billing:  Please note: for coding purposes this visit should be billed only as established and not new, since this patient was seen by my same subspecialty team ([facility]) during the hospitalization that this visit is a follow up for.  I spent a total of 22 minutes on the day of the visit.   Time spent by me today doing chart review, history and exam, documentation and further activities per the note      Again, thank you for allowing me to participate in the care of your patient.        Sincerely,        DEBBIE Martino  CNP    Electronically signed

## 2025-05-08 NOTE — NURSING NOTE
Current patient location: [address] [place] [id number]    Is the patient currently in the state of [place]? YES    Visit mode:VIDEO    If the visit is dropped, the patient can be reconnected by: VIDEO VISIT: Text to cell phone:   Telephone Information:   Mobile [phone]       Will anyone else be joining the visit? NO  (If patient encounters technical issues they should call [phone])    How would you like to obtain your AVS? MyChart    Are changes needed to the allergy or medication list? No    Are refills needed on medications prescribed by this physician? NO    Reason for visit: Stroke    Kate Beth MA

## 2025-05-19 ENCOUNTER — APPOINTMENT (OUTPATIENT)
Dept: CARDIOLOGY | Facility: CLINIC | Age: 35
End: 2025-05-19
Attending: INTERNAL MEDICINE
Payer: COMMERCIAL

## 2025-05-19 ENCOUNTER — RESULTS FOLLOW-UP (OUTPATIENT)
Dept: NEUROLOGY | Facility: CLINIC | Age: 35
End: 2025-05-19

## 2025-05-19 ENCOUNTER — LAB (OUTPATIENT)
Dept: LAB | Facility: CLINIC | Age: 35
End: 2025-05-19
Attending: INTERNAL MEDICINE
Payer: COMMERCIAL

## 2025-05-19 ENCOUNTER — HOSPITAL ENCOUNTER (OUTPATIENT)
Facility: CLINIC | Age: 35
Discharge: HOME | End: 2025-05-19
Attending: INTERNAL MEDICINE | Admitting: INTERNAL MEDICINE
Payer: COMMERCIAL

## 2025-05-19 ENCOUNTER — APPOINTMENT (OUTPATIENT)
Dept: MEDSURG UNIT | Facility: CLINIC | Age: 35
End: 2025-05-19
Attending: INTERNAL MEDICINE
Payer: COMMERCIAL

## 2025-05-19 VITALS
TEMPERATURE: 97.5 F | RESPIRATION RATE: 16 BRPM | DIASTOLIC BLOOD PRESSURE: 85 MMHG | WEIGHT: 198 LBS | HEIGHT: 70 IN | BODY MASS INDEX: 28.35 KG/M2 | SYSTOLIC BLOOD PRESSURE: 118 MMHG | HEART RATE: 64 BPM | OXYGEN SATURATION: 100 %

## 2025-05-19 DIAGNOSIS — Q21.10 RESIDUAL ASD (ATRIAL SEPTAL DEFECT) FOLLOWING REPAIR: ICD-10-CM

## 2025-05-19 DIAGNOSIS — Q21.12 PFO (PATENT FORAMEN OVALE): Primary | ICD-10-CM

## 2025-05-19 DIAGNOSIS — Z98.890 STATUS POST CORONARY ANGIOGRAM: ICD-10-CM

## 2025-05-19 DIAGNOSIS — Q21.20 ASD (ATRIAL SEPTAL DEFECT), OSTIUM PRIMUM: ICD-10-CM

## 2025-05-19 DIAGNOSIS — H34.232 BRANCH RETINAL ARTERY OCCLUSION OF LEFT EYE: ICD-10-CM

## 2025-05-19 DIAGNOSIS — Q24.9 ADULT CONGENITAL HEART DISEASE: ICD-10-CM

## 2025-05-19 LAB
ACT BLD: 263 SECONDS (ref 74–150)
ANION GAP SERPL CALCULATED.3IONS-SCNC: 11 MMOL/L (ref 7–15)
APTT PPP: 29 SECONDS (ref 22–38)
BUN SERPL-MCNC: 15.3 MG/DL (ref 6–20)
CALCIUM SERPL-MCNC: 9.4 MG/DL (ref 8.8–10.4)
CHLORIDE SERPL-SCNC: 101 MMOL/L (ref 98–107)
CHOLEST SERPL-MCNC: 151 MG/DL
CREAT SERPL-MCNC: 0.97 MG/DL (ref 0.67–1.17)
EGFRCR SERPLBLD CKD-EPI 2021: >90 ML/MIN/1.73M2
ERYTHROCYTE [DISTWIDTH] IN BLOOD BY AUTOMATED COUNT: 11.9 % (ref 10–15)
FASTING STATUS PATIENT QL REPORTED: YES
FASTING STATUS PATIENT QL REPORTED: YES
GLUCOSE SERPL-MCNC: 100 MG/DL (ref 70–99)
HCO3 SERPL-SCNC: 27 MMOL/L (ref 22–29)
HCT VFR BLD AUTO: 44.6 % (ref 40–53)
HDLC SERPL-MCNC: 49 MG/DL
HGB BLD-MCNC: 15 G/DL (ref 13.3–17.7)
INR PPP: 1.04 (ref 0.85–1.15)
LDLC SERPL CALC-MCNC: 83 MG/DL
LVEF ECHO: NORMAL
MCH RBC QN AUTO: 31.2 PG (ref 26.5–33)
MCHC RBC AUTO-ENTMCNC: 33.6 G/DL (ref 31.5–36.5)
MCV RBC AUTO: 93 FL (ref 78–100)
NONHDLC SERPL-MCNC: 102 MG/DL
PLATELET # BLD AUTO: 196 10E3/UL (ref 150–450)
POTASSIUM SERPL-SCNC: 4 MMOL/L (ref 3.4–5.3)
PROTHROMBIN TIME: 13.6 SECONDS (ref 11.8–14.8)
RBC # BLD AUTO: 4.81 10E6/UL (ref 4.4–5.9)
SODIUM SERPL-SCNC: 139 MMOL/L (ref 135–145)
TRIGL SERPL-MCNC: 94 MG/DL
WBC # BLD AUTO: 4.9 10E3/UL (ref 4–11)

## 2025-05-19 PROCEDURE — 250N000011 HC RX IP 250 OP 636: Mod: JZ | Performed by: INTERNAL MEDICINE

## 2025-05-19 PROCEDURE — C1769 GUIDE WIRE: HCPCS | Performed by: INTERNAL MEDICINE

## 2025-05-19 PROCEDURE — 85730 THROMBOPLASTIN TIME PARTIAL: CPT | Performed by: INTERNAL MEDICINE

## 2025-05-19 PROCEDURE — C1759 CATH, INTRA ECHOCARDIOGRAPHY: HCPCS | Performed by: INTERNAL MEDICINE

## 2025-05-19 PROCEDURE — 36415 COLL VENOUS BLD VENIPUNCTURE: CPT | Performed by: INTERNAL MEDICINE

## 2025-05-19 PROCEDURE — 93306 TTE W/DOPPLER COMPLETE: CPT | Mod: 26 | Performed by: INTERNAL MEDICINE

## 2025-05-19 PROCEDURE — C1887 CATHETER, GUIDING: HCPCS | Performed by: INTERNAL MEDICINE

## 2025-05-19 PROCEDURE — 258N000003 HC RX IP 258 OP 636: Performed by: INTERNAL MEDICINE

## 2025-05-19 PROCEDURE — C1817 SEPTAL DEFECT IMP SYS: HCPCS | Performed by: INTERNAL MEDICINE

## 2025-05-19 PROCEDURE — 93580 TRANSCATH CLOSURE OF ASD: CPT | Performed by: INTERNAL MEDICINE

## 2025-05-19 PROCEDURE — 85018 HEMOGLOBIN: CPT | Performed by: INTERNAL MEDICINE

## 2025-05-19 PROCEDURE — 250N000013 HC RX MED GY IP 250 OP 250 PS 637: Performed by: INTERNAL MEDICINE

## 2025-05-19 PROCEDURE — 93580 TRANSCATH CLOSURE OF ASD: CPT | Performed by: PEDIATRICS

## 2025-05-19 PROCEDURE — 999N000054 HC STATISTIC EKG NON-CHARGEABLE

## 2025-05-19 PROCEDURE — 999N000134 HC STATISTIC PP CARE STAGE 3

## 2025-05-19 PROCEDURE — 250N000009 HC RX 250: Performed by: INTERNAL MEDICINE

## 2025-05-19 PROCEDURE — 272N000001 HC OR GENERAL SUPPLY STERILE: Performed by: INTERNAL MEDICINE

## 2025-05-19 PROCEDURE — 85347 COAGULATION TIME ACTIVATED: CPT

## 2025-05-19 PROCEDURE — 85610 PROTHROMBIN TIME: CPT | Performed by: INTERNAL MEDICINE

## 2025-05-19 PROCEDURE — 99152 MOD SED SAME PHYS/QHP 5/>YRS: CPT | Performed by: INTERNAL MEDICINE

## 2025-05-19 PROCEDURE — C1760 CLOSURE DEV, VASC: HCPCS | Performed by: INTERNAL MEDICINE

## 2025-05-19 PROCEDURE — 93306 TTE W/DOPPLER COMPLETE: CPT

## 2025-05-19 PROCEDURE — 99152 MOD SED SAME PHYS/QHP 5/>YRS: CPT | Performed by: PEDIATRICS

## 2025-05-19 PROCEDURE — 93005 ELECTROCARDIOGRAM TRACING: CPT

## 2025-05-19 PROCEDURE — 82947 ASSAY GLUCOSE BLOOD QUANT: CPT | Performed by: INTERNAL MEDICINE

## 2025-05-19 PROCEDURE — 93451 RIGHT HEART CATH: CPT | Performed by: INTERNAL MEDICINE

## 2025-05-19 PROCEDURE — 999N000142 HC STATISTIC PROCEDURE PREP ONLY

## 2025-05-19 PROCEDURE — C1894 INTRO/SHEATH, NON-LASER: HCPCS | Performed by: INTERNAL MEDICINE

## 2025-05-19 PROCEDURE — 83718 ASSAY OF LIPOPROTEIN: CPT

## 2025-05-19 PROCEDURE — 99153 MOD SED SAME PHYS/QHP EA: CPT | Performed by: INTERNAL MEDICINE

## 2025-05-19 DEVICE — OCCLUDER CARDIOFORM SEPTAL 25MM: Type: IMPLANTABLE DEVICE | Status: FUNCTIONAL

## 2025-05-19 RX ORDER — FENTANYL CITRATE 50 UG/ML
INJECTION, SOLUTION INTRAMUSCULAR; INTRAVENOUS
Status: DISCONTINUED | OUTPATIENT
Start: 2025-05-19 | End: 2025-05-19 | Stop reason: HOSPADM

## 2025-05-19 RX ORDER — ASPIRIN 325 MG
TABLET ORAL
Status: DISCONTINUED | OUTPATIENT
Start: 2025-05-19 | End: 2025-05-19 | Stop reason: HOSPADM

## 2025-05-19 RX ORDER — NALOXONE HYDROCHLORIDE 0.4 MG/ML
0.2 INJECTION, SOLUTION INTRAMUSCULAR; INTRAVENOUS; SUBCUTANEOUS
Status: DISCONTINUED | OUTPATIENT
Start: 2025-05-19 | End: 2025-05-19 | Stop reason: HOSPADM

## 2025-05-19 RX ORDER — LIDOCAINE 40 MG/G
CREAM TOPICAL
Status: DISCONTINUED | OUTPATIENT
Start: 2025-05-19 | End: 2025-05-19 | Stop reason: HOSPADM

## 2025-05-19 RX ORDER — HEPARIN SODIUM 1000 [USP'U]/ML
INJECTION, SOLUTION INTRAVENOUS; SUBCUTANEOUS
Status: DISCONTINUED | OUTPATIENT
Start: 2025-05-19 | End: 2025-05-19 | Stop reason: HOSPADM

## 2025-05-19 RX ORDER — CLOPIDOGREL BISULFATE 75 MG/1
75 TABLET ORAL DAILY
Qty: 90 TABLET | Refills: 0 | Status: SHIPPED | OUTPATIENT
Start: 2025-05-19

## 2025-05-19 RX ORDER — POTASSIUM CHLORIDE 750 MG/1
20 TABLET, EXTENDED RELEASE ORAL
Status: DISCONTINUED | OUTPATIENT
Start: 2025-05-19 | End: 2025-05-19 | Stop reason: HOSPADM

## 2025-05-19 RX ORDER — POTASSIUM CHLORIDE 750 MG/1
40 TABLET, EXTENDED RELEASE ORAL
Status: DISCONTINUED | OUTPATIENT
Start: 2025-05-19 | End: 2025-05-19 | Stop reason: HOSPADM

## 2025-05-19 RX ORDER — OXYCODONE HYDROCHLORIDE 5 MG/1
5 TABLET ORAL EVERY 4 HOURS PRN
Status: DISCONTINUED | OUTPATIENT
Start: 2025-05-19 | End: 2025-05-19 | Stop reason: HOSPADM

## 2025-05-19 RX ORDER — ACETAMINOPHEN 325 MG/1
650 TABLET ORAL EVERY 4 HOURS PRN
Status: DISCONTINUED | OUTPATIENT
Start: 2025-05-19 | End: 2025-05-19 | Stop reason: HOSPADM

## 2025-05-19 RX ORDER — FENTANYL CITRATE 50 UG/ML
25 INJECTION, SOLUTION INTRAMUSCULAR; INTRAVENOUS
Refills: 0 | Status: DISCONTINUED | OUTPATIENT
Start: 2025-05-19 | End: 2025-05-19 | Stop reason: HOSPADM

## 2025-05-19 RX ORDER — ATROPINE SULFATE 0.1 MG/ML
0.5 INJECTION INTRAVENOUS
Status: DISCONTINUED | OUTPATIENT
Start: 2025-05-19 | End: 2025-05-19 | Stop reason: HOSPADM

## 2025-05-19 RX ORDER — SODIUM CHLORIDE 9 MG/ML
INJECTION, SOLUTION INTRAVENOUS CONTINUOUS
Status: DISCONTINUED | OUTPATIENT
Start: 2025-05-19 | End: 2025-05-19 | Stop reason: HOSPADM

## 2025-05-19 RX ORDER — OXYCODONE HYDROCHLORIDE 10 MG/1
10 TABLET ORAL EVERY 4 HOURS PRN
Status: DISCONTINUED | OUTPATIENT
Start: 2025-05-19 | End: 2025-05-19 | Stop reason: HOSPADM

## 2025-05-19 RX ORDER — ASPIRIN 325 MG
325 TABLET ORAL ONCE
Status: COMPLETED | OUTPATIENT
Start: 2025-05-19 | End: 2025-05-19

## 2025-05-19 RX ORDER — FLUMAZENIL 0.1 MG/ML
0.2 INJECTION, SOLUTION INTRAVENOUS
Status: DISCONTINUED | OUTPATIENT
Start: 2025-05-19 | End: 2025-05-19 | Stop reason: HOSPADM

## 2025-05-19 RX ORDER — NALOXONE HYDROCHLORIDE 0.4 MG/ML
0.4 INJECTION, SOLUTION INTRAMUSCULAR; INTRAVENOUS; SUBCUTANEOUS
Status: DISCONTINUED | OUTPATIENT
Start: 2025-05-19 | End: 2025-05-19 | Stop reason: HOSPADM

## 2025-05-19 RX ORDER — ASPIRIN 81 MG/1
243 TABLET, CHEWABLE ORAL ONCE
Status: COMPLETED | OUTPATIENT
Start: 2025-05-19 | End: 2025-05-19

## 2025-05-19 RX ADMIN — SODIUM CHLORIDE: 0.9 INJECTION, SOLUTION INTRAVENOUS at 10:22

## 2025-05-19 RX ADMIN — LIDOCAINE: 40 CREAM TOPICAL at 10:15

## 2025-05-19 ASSESSMENT — ACTIVITIES OF DAILY LIVING (ADL)
ADLS_ACUITY_SCORE: 43
ADLS_ACUITY_SCORE: 41
ADLS_ACUITY_SCORE: 43

## 2025-05-19 NOTE — DISCHARGE INSTRUCTIONS
Going Home after an Coronary Angiogram  ______________________________________________      After you go home:  Have an adult stay with you for 24 hours.  Drink plenty of fluids.  You may eat your normal diet, unless your doctor tells you otherwise.  For 24 hours:  Relax and take it easy.  Do NOT smoke.  Do NOT make any important or legal decisions.  Do NOT drive or operate machines at home or at work.  Do NOT drink alcohol.  Remove the Band-Aid after 24 hours. If there is minor oozing, apply another Band-aid and remove it after 12 hours.  For 2 days, do NOT have sex or do any heavy exercise.  Do NOT take a bath, or use a hot tub or pool for at least 3 days. You may shower.    Care of groin site  It is normal to have a small bruise or lump at the site.  Do not scrub the site.  For the first 2 days: Do not stoop or squat. When you cough, sneeze or move your bowels, hold your hand over the puncture site and press gently.  Do not lift more than 10 pounds for at least 3 to 5 days.  Do not use lotion or powder near the puncture site for 3 days.    If you start bleeding from the site in your groin, lie down flat and press firmly  on the site. Call your doctor as soon as you can.    Medicines  If you have stopped any other medicines, check with your nurse or provider about when to restart them.    Call 911 right away if you have bleeding that is heavy or does not stop.    Call your doctor if:  You have a large or growing hard lump around the site.  The site is red, swollen, hot or tender.  Blood or fluid is draining from the site.  You have chills or a fever greater than 101 F (38 C).  Your leg or arm feels numb or cool.  You have hives, a rash or unusual itching.        Follow Up: Per your primary cardiology team    If you have any questions or concerns regarding your procedure site please call [phone] at any time & press option 4 to speak to the .  Ask for the Cardiology Fellow on call.  They are available  24 hours a day.  You may also contact the Cardiology Clinic after hours number at [phone].                                                       Telephone Numbers [phone] Monday-Friday 8:00 am to 4:30 pm    [phone] After 4:30 pm Monday-Friday, Weekends & Holidays  Ask for Interventional Cardiologist on call. Someone is on call 24 hours/day   [facility] toll free number [phone] Monday-Friday 8:00 am to 4:30 pm   [facility] Emergency Dept [phone]

## 2025-05-19 NOTE — PROGRESS NOTES
Pt tolerated post coronary angiogram recovery well. Patient tolerated oral intake and liquids. Ambulated without difficulty. Voided. Bilateral groin puncture sites are stable with no bleeding and no hematoma. Denies any numbness or tingling. Pulses present. Vascade seal and transparent dressing in place. Patient has a . A+O x4 and making needs known. CMS intact.  VSSA.  Sinus rhythm on monitor.  IV access removed.  Education completed and outlined in AVS or handout: medications reviewed with patient and prescription (Plavix) sent to pt's home pharmacy.  Questions answered prior to discharge. Discharge instructions reviewed with pt. Pt verbalized understanding, copy given to patient.  Dr. Rojo came to see pt at bedside.  Belongings returned to patient at discharge.      P: Wheelchair ride out to [employer]. Pt's spouse Kari transported patient home.  Patient to follow up with appts as per discharge instruction.

## 2025-05-19 NOTE — PROGRESS NOTES
Pt prepped for ASD closure, RHC/LHC. Pt alert and oriented. VSS. Sats >92% on RA. EKG: SR w/1st AV block. Pt denies any pain. Denies any neuro deficits. Aspirin 325 mg taken this morning. Lidocaine applied on right neck. Bilateral groin sites prepped, pulses present and marked. Continue to monitor pt status and notify MD with any changes or concerns.

## 2025-05-19 NOTE — PRE-PROCEDURE
GENERAL PRE-PROCEDURE:   Procedure:  Right and left heart cath with shunt run and possible angiogram and possible closure of defect and intracardiac imaging  Date/Time:  5/19/2025 11:45 AM    Verbal consent obtained?: Yes    Written consent obtained?: Yes    Risks and benefits: Risks, benefits and alternatives were discussed    Consent given by:  Patient  Patient states understanding of procedure being performed: Yes    Patient's understanding of procedure matches consent: Yes    Procedure consent matches procedure scheduled: Yes    Expected level of sedation:  Moderate  Appropriately NPO:  Yes  ASA Class:  3  Mallampati  :  Grade 2- soft palate, base of uvula, tonsillar pillars, and portion of posterior pharyngeal wall visible  Lungs:  Lungs clear with good breath sounds bilaterally  Heart:  Normal heart sounds and rate  History & Physical reviewed:  History and physical reviewed and no updates needed  Statement of review:  I have reviewed the lab findings, diagnostic data, medications, and the plan for sedation

## 2025-05-19 NOTE — PROGRESS NOTES
D/I/A: Pt roomed on 2A in Room 9.  Arrived via litter and accompanied by CCL RN Sy LOERA.  Rhythm upon arrival: Sinus rhythm on monitor.  Denies pain or sob.  Reviewed activity restrictions and when to notify RN, ie-changes to breathing or increased chest pressure or chest pain.  CCL access:  Bilateral groin sites WNL. No bleeding or hematoma. Vascade seal. Transparent dressing in place.     P: Continue to monitor pt status and notify MD with any changes or concerns.  Discharge to home once meeting criteria.

## 2025-05-20 ENCOUNTER — TELEPHONE (OUTPATIENT)
Dept: CARDIOLOGY | Facility: CLINIC | Age: 35
End: 2025-05-20
Payer: COMMERCIAL

## 2025-05-20 LAB
ATRIAL RATE - MUSE: 61 BPM
DIASTOLIC BLOOD PRESSURE - MUSE: NORMAL MMHG
INTERPRETATION ECG - MUSE: NORMAL
P AXIS - MUSE: 23 DEGREES
PR INTERVAL - MUSE: 228 MS
QRS DURATION - MUSE: 88 MS
QT - MUSE: 424 MS
QTC - MUSE: 426 MS
R AXIS - MUSE: 11 DEGREES
SYSTOLIC BLOOD PRESSURE - MUSE: NORMAL MMHG
T AXIS - MUSE: 34 DEGREES
VENTRICULAR RATE- MUSE: 61 BPM

## 2025-05-20 NOTE — TELEPHONE ENCOUNTER
Post-Procedure Follow-up Phone Call (Cardiology)    Procedure:  ASD closure  Date of Call: 5/20/2025  Date of Procedure: 5/19/2025  Physician: Dr Rojo     Procedure Site    Wrist, arm, femoral  It is normal to have soreness and or bruising at the puncture site and mild tingling in your hand for up to 3 days. The site should be flat and dry. Not red, hot, tender, or swollen. Small bruise and lump is present at site - denies hardness or growth. Denies the presence of fluid or blood draining from site.    Symptoms Denies numb, cool, or blue extremities. Denies chest pain and/or shortness of breath.    Infection Denies chills or fever. Denies hives, rash, or unusual itching.     Rhythm WNL   Medications resumed    If you have started taking Plavix do not stop taking it until you talk to your heart doctor (cardiologist). Dual antiplatelet therapy for at least one year.    Review Nitroglycerin instructions, take one tablet under the tongue for chest pain, if there is no relief take another one 5 minutes apart. If you still have no relief from the chest pain, call 911.   Appointments 6/19 with Dr Rojo with an echo prior. Reviewed appointment dates and times with patient. Instructed patient to keep appointments.     Education The following was reviewed with the patient:    Can remove Band-Aid from procedure site after 24 hours.   If minor oozing occurs, apply another Band-Aid and remove it after 12 hours.   Do not take a bath, use a hot tub, pool, or submerse in water for at least 3 days.   You can shower. Do not scrub the procedure site.   Do not use lotion or powder near the procedure site for 3 days.   For the first 2 days after your procedure, do not stoop or squat.   When you cough, sneeze, or move your bowels, hold your hand over the procedure site and press gently.   Do not lift more than 10 pounds or participate in exertional activity for 10 days. If you start bleeding from your procedure site, lie down flat and  press firmly on site. Call 911 right away if you have heavy bleeding that does not stop.   Call us at [phone] should you have a large or growing lump around the site, the site is red, swollen, hot, or tender, blood or fluid is draining from the site, you have chills or fever, your legs or arms turns bluish, feels numb, or cool, and/or if you have hives, rash, or unusual itching.     If a cath site was in the arm:  For 2 days, do not use your hand or arm to support your weight (such as rising from a chair) or bend your wrist (such as lifting a garage door).  For 2 days, do not lift more than 5 pounds or exercise your arm (tennis, golf or bowling).   Patient Comments Feeling well      Patient verbalized understanding of information in call and will call with any other questions at [phone].

## 2025-05-20 NOTE — TELEPHONE ENCOUNTER
Reviewed and patient should be on ASA and plavix, ok to increase cardio exercise when site healed, about 10 days. Patient verbalized understanding and is in agreement to the plan.

## 2025-06-16 NOTE — PATIENT INSTRUCTIONS
"Thank you for visiting the Adult Congenital and Cardiovascular Genetics Clinic at the [facility].    Cardiology Providers you saw during your visit:  NEGRITA Rojo MD    Diagnosis:  S/P ASD closure    Results:  NEGRIAT Rojo MD reviewed the results of your EKG and echocardiogram  testing today in clinic.    If you have questions or concerns, please call us at [phone] or contact us through [employer].  ______________________________________________________________________________    Recommendations from your Cardiology Provider TODAY:    Continue Plavix until August  Let us know if you have worsening SVT episodes or are not able to terminate them with valsalva.  No elective dental work for 6 months after ASD closure, if you do need dental work, take Amoxicillin prior to the dental visit. We can send in a prescription if needed.       ____________________________________________________________________________________________________    Follow-up Plan:  Follow up with Dr Rojo in 2 years with an echocardiogram prior    ____________________________________________________________________________________________________    If you have questions or concerns, please call us at [phone] or contact us through [employer]. Our fax number is [phone]    Nidhi Cruz RN RN, BSN   Susie Ingram (Scheduling)  Nurse Care Coordinator     Clinic   Adult Congenital and CV Genetics              Adult Congenital and CV Genetics  [facility]              [facility]        For after hours urgent needs, call [phone] and ask to speak to the \"On-Call Cardiologist.\"    For emergencies call 911.      ____________________________________________________________________________________________________    Additional Important Information for Your Heart Health      General Cardiac Recommendations:  Continue to eat a heart healthy, low salt " diet.  Continue to get 20-30 minutes of aerobic activity, 4-5 days per week.  Examples of aerobic activity include walking, running, swimming, cycling, etc.  Continue to observe good oral hygiene, with regular dental visits.        SBE prophylaxis (antibiotics needed before dental appointments):   Yes__X__  No____    SBE prophylaxis applies to patients with certain heart conditions who are recommended to take antibiotics before dental appointments and other specific procedures. These antibiotics are to help prevent an infection of the heart (endocarditis) that certain patients are at higher risk of developing. The guidelines used come from the American Heart Association and are periodically updated.    If YES is checked, follow the recommendations outlined below:  Take antibiotic(s) prior to recommended dental procedures and procedures involving the respiratory tract or procedures involving infections of the skin, muscle or bones.   SBE prophylaxis is not needed for routine gastrointestinal and genitourinary procedures (ie. Colonoscopy or vaginal delivery)  Observe good oral hygiene daily, as advised by your dentist. Get regular professional dental care.  Keep cuts and other open injuries clean.  All infections should be treated as soon as possible.  Symptoms of Infective Endocarditis could include: fever lasting more than 4-5 days or a recurrent fever that initially resolves but returns within 1-2 days). Call us a [phone] if you are experiencing these symptoms.        FASTING CHOLESTEROL was checked in the last 5 years YES__X__  NO____ (2025)  If no, please follow up with your primary care physician. You should have a cholesterol screening every 5 years at minimum, and every year if taking a medication for your cholesterol levels.

## 2025-06-18 ASSESSMENT — SLEEP AND FATIGUE QUESTIONNAIRES
HOW LIKELY ARE YOU TO NOD OFF OR FALL ASLEEP WHILE SITTING AND TALKING TO SOMEONE: WOULD NEVER DOZE
HOW LIKELY ARE YOU TO NOD OFF OR FALL ASLEEP WHILE LYING DOWN TO REST IN THE AFTERNOON WHEN CIRCUMSTANCES PERMIT: SLIGHT CHANCE OF DOZING
HOW LIKELY ARE YOU TO NOD OFF OR FALL ASLEEP WHILE SITTING AND READING: SLIGHT CHANCE OF DOZING
HOW LIKELY ARE YOU TO NOD OFF OR FALL ASLEEP WHILE SITTING QUIETLY AFTER LUNCH WITHOUT ALCOHOL: WOULD NEVER DOZE
HOW LIKELY ARE YOU TO NOD OFF OR FALL ASLEEP WHEN YOU ARE A PASSENGER IN A CAR FOR AN HOUR WITHOUT A BREAK: SLIGHT CHANCE OF DOZING
HOW LIKELY ARE YOU TO NOD OFF OR FALL ASLEEP WHILE SITTING INACTIVE IN A PUBLIC PLACE: WOULD NEVER DOZE
HOW LIKELY ARE YOU TO NOD OFF OR FALL ASLEEP IN A CAR, WHILE STOPPED FOR A FEW MINUTES IN TRAFFIC: WOULD NEVER DOZE
HOW LIKELY ARE YOU TO NOD OFF OR FALL ASLEEP WHILE WATCHING TV: SLIGHT CHANCE OF DOZING

## 2025-06-19 ENCOUNTER — OFFICE VISIT (OUTPATIENT)
Dept: CARDIOLOGY | Facility: CLINIC | Age: 35
End: 2025-06-19
Attending: INTERNAL MEDICINE
Payer: COMMERCIAL

## 2025-06-19 VITALS
WEIGHT: 199.8 LBS | BODY MASS INDEX: 28.6 KG/M2 | DIASTOLIC BLOOD PRESSURE: 91 MMHG | HEART RATE: 53 BPM | SYSTOLIC BLOOD PRESSURE: 129 MMHG | HEIGHT: 70 IN

## 2025-06-19 DIAGNOSIS — Z87.74 S/P CLOSURE OF CONGENITAL ATRIAL SEPTAL DEFECT BY PERCUTANEOUS TRANSCATHETER TECHNIQUE: ICD-10-CM

## 2025-06-19 DIAGNOSIS — Q21.20 ASD (ATRIAL SEPTAL DEFECT), OSTIUM PRIMUM: ICD-10-CM

## 2025-06-19 DIAGNOSIS — Q24.9 ADULT CONGENITAL HEART DISEASE: ICD-10-CM

## 2025-06-19 DIAGNOSIS — Q23.82 CONGENITAL CLEFT LEAFLET OF MITRAL VALVE: ICD-10-CM

## 2025-06-19 DIAGNOSIS — Z98.890 STATUS POST CORONARY ANGIOGRAM: ICD-10-CM

## 2025-06-19 DIAGNOSIS — I10 HYPERTENSION, UNSPECIFIED TYPE: ICD-10-CM

## 2025-06-19 RX ORDER — CLOPIDOGREL BISULFATE 75 MG/1
75 TABLET ORAL DAILY
Qty: 90 TABLET | Refills: 3 | Status: SHIPPED | OUTPATIENT
Start: 2025-06-19

## 2025-06-19 RX ORDER — HYDROCHLOROTHIAZIDE 25 MG/1
25 TABLET ORAL DAILY
Qty: 90 TABLET | Refills: 3 | Status: SHIPPED | OUTPATIENT
Start: 2025-06-19

## 2025-06-19 RX ORDER — METOPROLOL SUCCINATE 25 MG/1
25 TABLET, EXTENDED RELEASE ORAL DAILY
Qty: 90 TABLET | Refills: 3 | Status: SHIPPED | OUTPATIENT
Start: 2025-06-19

## 2025-06-19 NOTE — PROGRESS NOTES
"CARDIOLOGY CONSULTATION:    Mr. Massey is a very pleasant 35-year-old gentleman with a past medical history significant for primary atrial septal defect and cleft mitral valve diagnosed at 13 years of age.  He had surgery at the [facility] and was followed by Dr. Wang originally.      When I met Mr. Massey for the first time in 11/2017, his symptoms were primarily palpitations and those improved with stopping alcohol use. 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  He has a 7 year old daughter and 2.5 year old son and both have structurally normal hearts.  He has a family history of blood clots with a PE in his mom and fatal DVT in his dad at 30 and 40 respectively.      He was admitted 1/2025 to [facility] with three days of left visual symptoms that he apparently was told by an eye specialist was L branch retinal artery occlusion (confirmed by retinal specialist).  He was found in the ER on CTA to have no significant vascular disease.  They reported a tiny chronic lateral left cerebellar lesion.  MRI was done that showed nonspecific scattered foci in the cerebellar white matter that \"could be seen in the setting of migraines\". U/S of LE showed no DVT and CT of iliac/femoral veins showed no clot. TTE showed only on Valsalva a small amount of bubbles crossing.  He then had a GERMÁN 2/28/25 and that was not able to be completed with Valsalva but no obvious septal defect was seen.  He has no arrhythmias and no clot was seen on GERMÁN.  He is now being seen in follow up.   and normal A1C 1/2025.  Clotting panel was negative. He saw neurology in follow up from the hospital (2/2025) and with the L branch retinal artery occlusion they had elevated concern for embolic etiology of his symptoms and will see him in 3 months in follow up after cardiologic workup and care is complete.       We completed the heart cath 5/19/25 with note of a pretty normal heart pressures and a tunnel PFO " for which he underwent closure with a 25 mm PFO device.  Echo 6/19/25 shows the device is well seated with no residual defect. Off Plavix in August and will continue with baby ASA.     PAST MEDICAL HISTORY:  Past Medical History:   Diagnosis Date    ASD (atrial septal defect), ostium primum     Cleft leaflet of mitral valve     Migraine     Stroke, embolic (H)        CURRENT MEDICATIONS:  Current Outpatient Medications   Medication Sig Dispense Refill    aspirin 81 MG EC tablet Take 1 tablet (81 mg) by mouth daily.      clopidogrel (PLAVIX) 75 MG tablet Take 1 tablet (75 mg) by mouth daily. 90 tablet 3    hydrochlorothiazide (HYDRODIURIL) 25 MG tablet Take 1 tablet (25 mg) by mouth daily. 90 tablet 3    metoprolol succinate ER (TOPROL XL) 25 MG 24 hr tablet Take 1 tablet (25 mg) by mouth daily. 90 tablet 3       PAST SURGICAL HISTORY:  Past Surgical History:   Procedure Laterality Date    ASD REPAIR, OSTIUM PRIMUM  2017    N    ATRIAL SEPTAL DEFECT CLOSURE N/A 5/19/2025    Procedure: Atrial Septal Defect Closure;  Surgeon: Rubina Rojo MD;  Location:  HEART CARDIAC CATH LAB    CONGENITAL RIGHT AND LEFT CATHETERIZATION N/A 5/19/2025    Procedure: Congenital Right and Left Catheterization;  Surgeon: Rubina Rojo MD;  Location:  HEART CARDIAC CATH LAB    VASECTOMY         ALLERGIES  Patient has no known allergies.    FAMILY HX:  Family History   Problem Relation Age of Onset    Hypertension Father     Thrombosis Father 42        DVT    Other - See Comments Father         WPW    Thrombosis Mother     Colon Cancer Maternal Grandfather     Migraines Paternal Grandmother     Dementia Paternal Grandfather     Hypertension Brother     Other - See Comments Paternal Aunt         Mitral valve prolapse       SOCIAL HX:  Social History     Socioeconomic History    Marital status:    Tobacco Use    Smoking status: Never    Smokeless tobacco: Never   Vaping Use    Vaping status: Never Used  "  Substance and Sexual Activity    Alcohol use: Yes     Comment: very rare    Drug use: No    Sexual activity: Yes     Partners: Female   Other Topics Concern    Parent/sibling w/ CABG, MI or angioplasty before 65F 55M? No     Social Drivers of Health     Interpersonal Safety: Unknown (5/19/2025)    Interpersonal Safety     Do you feel physically and emotionally safe where you currently live?: Patient unable to answer     Within the past 12 months, have you been hit, slapped, kicked or otherwise physically hurt by someone?: Patient unable to answer     Within the past 12 months, have you been humiliated or emotionally abused in other ways by your partner or ex-partner?: Patient unable to answer       ROS:  Constitutional: No fever, chills, or sweats. No weight gain/loss.   ENT: No visual disturbance, ear ache, epistaxis, sore throat.   Allergies/Immunologic: Negative.   Respiratory: No cough, hemoptysis.   Cardiovascular: As per HPI.   GI: No nausea, vomiting, hematemesis, melena, or hematochezia.   : No urinary frequency, dysuria, or hematuria.   Integument: Negative.   Psychiatric: Negative.   Neuro: Negative.   Endocrinology: Negative.   Musculoskeletal: No myalgia.    VITAL SIGNS:  BP (!) 129/91   Pulse 53   Ht 1.778 m (5' 10\")   Wt 90.6 kg (199 lb 12.8 oz)   BMI 28.67 kg/m    Body mass index is 28.67 kg/m .  Wt Readings from Last 2 Encounters:   06/19/25 90.6 kg (199 lb 12.8 oz)   05/19/25 89.8 kg (198 lb)       PHYSICAL EXAM  Ludwin Massey IS A 35 year old male.in no acute distress.  HEENT: Unremarkable.  Neck: JVP normal.  Carotids +4/4 bilaterally without bruits.  Lungs: CTA.  Cor: RRR. Normal S1 and S2.  No murmur, rub, or gallop.  PMI in Lf 5th ICS.  Abd: Soft, nontender, nondistended.  NABS.  No pulsatile mass.  Extremities: No C/C/E.  Pulses +4/4 symmetric in upper and lower extremities.  Neuro: Grossly intact.    LABS    Lab Results   Component Value Date    WBC 4.9 05/19/2025    WBC 7.2 " "12/12/2019     Lab Results   Component Value Date    RBC 4.81 05/19/2025    RBC 4.82 12/12/2019     Lab Results   Component Value Date    HGB 15.0 05/19/2025    HGB 15.4 12/12/2019     Lab Results   Component Value Date    HCT 44.6 05/19/2025    HCT 44.9 12/12/2019     No components found for: \"MCT\"  Lab Results   Component Value Date    MCV 93 05/19/2025    MCV 93 12/12/2019     Lab Results   Component Value Date    MCH 31.2 05/19/2025    MCH 32.0 12/12/2019     Lab Results   Component Value Date    MCHC 33.6 05/19/2025    MCHC 34.3 12/12/2019     Lab Results   Component Value Date    RDW 11.9 05/19/2025    RDW 12.3 12/12/2019     Lab Results   Component Value Date     05/19/2025     12/12/2019      Recent Labs   Lab Test 05/19/25  0933 01/17/25  0633    139   POTASSIUM 4.0 4.0   CHLORIDE 101 101   CO2 27 31*   ANIONGAP 11 7   * 95   BUN 15.3 13.4   CR 0.97 1.05   BHUPENDRA 9.4 9.3     Recent Labs   Lab Test 05/19/25  0933 01/16/25  1528   CHOL 151 184   HDL 49 54   LDL 83 104*   TRIG 94 131   NHDL 102 130*        IMPRESSION/PLAN:   1.  Status post repair of primary atrial septal defect and cleft mitral valve 06/2003 at [facility].  We do not have this operative report.   2.  Mild mitral and tricuspid insufficiency, stable.   3.  Hypertension controlled  4.  Left retinal artery branch occlusion 1/2025  5.  S/P PFO closure 5/19/25 with 25 mm [place] device  6.  BMI 28  7.  History of SVT     DISCUSSION:  It was a pleasure to see Mr. Massey in followup.  He is doing well with no concerning symptoms. His echo looked great with no residual defect.  Will come off Plavix in August.  He I working on diet and exercise with goal BMI 25.  Still has episodes of SVT that he is able to terminate with Valsalva; will let us know if events worsen.    Plan follow up in 2 years with an echo.      He understands and is in agreement with the plan as outlined.  All questions were answered.        It " was a pleasure to see him.  Please do not hesitate to contact me with any questions or concerns.      ETHAN ANDREWS MD    35 minutes face-face, documentation and review of records on day of visit      The longitudinal plan of care for the diagnosis(es)/condition(s) as documented were addressed during this visit. Due to the added complexity in care, I will continue to support Ludwin in the subsequent management and with ongoing continuity of care.

## 2025-06-19 NOTE — LETTER
"6/19/2025    Tamiko Flannery, APRN CNP  [address]  [facility] [address]    RE: Ludwin Massey       Dear Colleague,     I had the pleasure of seeing Ludwin Massey in the [facility].  CARDIOLOGY CONSULTATION:    Mr. Massey is a very pleasant 35-year-old gentleman with a past medical history significant for primary atrial septal defect and cleft mitral valve diagnosed at 13 years of age.  He had surgery at the [facility] and was followed by Dr. Wang originally.      When I met Mr. Massey for the first time in 11/2017, his symptoms were primarily palpitations and those improved with stopping alcohol use. 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  He has a 7 year old daughter and 2.5 year old son and both have structurally normal hearts.  He has a family history of blood clots with a PE in his mom and fatal DVT in his dad at 30 and 40 respectively.      He was admitted 1/2025 to [facility] with three days of left visual symptoms that he apparently was told by an eye specialist was L branch retinal artery occlusion (confirmed by retinal specialist).  He was found in the ER on CTA to have no significant vascular disease.  They reported a tiny chronic lateral left cerebellar lesion.  MRI was done that showed nonspecific scattered foci in the cerebellar white matter that \"could be seen in the setting of migraines\". U/S of LE showed no DVT and CT of iliac/femoral veins showed no clot. TTE showed only on Valsalva a small amount of bubbles crossing.  He then had a GERMÁN 2/28/25 and that was not able to be completed with Valsalva but no obvious septal defect was seen.  He has no arrhythmias and no clot was seen on GERMÁN.  He is now being seen in follow up.   and normal A1C 1/2025.  Clotting panel was negative. He saw neurology in follow up from the hospital (2/2025) and with the L branch retinal artery occlusion they had elevated concern for embolic " etiology of his symptoms and will see him in 3 months in follow up after cardiologic workup and care is complete.       We completed the heart cath 5/19/25 with note of a pretty normal heart pressures and a tunnel PFO for which he underwent closure with a 25 mm PFO device.  Echo 6/19/25 shows the device is well seated with no residual defect. Off Plavix in August and will continue with baby ASA.     PAST MEDICAL HISTORY:  Past Medical History:   Diagnosis Date     ASD (atrial septal defect), ostium primum      Cleft leaflet of mitral valve      Migraine      Stroke, embolic (H)        CURRENT MEDICATIONS:  Current Outpatient Medications   Medication Sig Dispense Refill     aspirin 81 MG EC tablet Take 1 tablet (81 mg) by mouth daily.       clopidogrel (PLAVIX) 75 MG tablet Take 1 tablet (75 mg) by mouth daily. 90 tablet 3     hydrochlorothiazide (HYDRODIURIL) 25 MG tablet Take 1 tablet (25 mg) by mouth daily. 90 tablet 3     metoprolol succinate ER (TOPROL XL) 25 MG 24 hr tablet Take 1 tablet (25 mg) by mouth daily. 90 tablet 3       PAST SURGICAL HISTORY:  Past Surgical History:   Procedure Laterality Date     ASD REPAIR, OSTIUM PRIMUM  2017    [facility]     ATRIAL SEPTAL DEFECT CLOSURE N/A 5/19/2025    Procedure: Atrial Septal Defect Closure;  Surgeon: Rubina Rojo MD;  Location:  HEART CARDIAC CATH LAB     CONGENITAL RIGHT AND LEFT CATHETERIZATION N/A 5/19/2025    Procedure: Congenital Right and Left Catheterization;  Surgeon: Rubina Rojo MD;  Location:  HEART CARDIAC CATH LAB     VASECTOMY         ALLERGIES  Patient has no known allergies.    FAMILY HX:  Family History   Problem Relation Age of Onset     Hypertension Father      Thrombosis Father 42        DVT     Other - See Comments Father         WPW     Thrombosis Mother      Colon Cancer Maternal Grandfather      Migraines Paternal Grandmother      Dementia Paternal Grandfather      Hypertension Brother      Other - See Comments Paternal  "Aunt         Mitral valve prolapse       SOCIAL HX:  Social History     Socioeconomic History     Marital status:    Tobacco Use     Smoking status: Never     Smokeless tobacco: Never   Vaping Use     Vaping status: Never Used   Substance and Sexual Activity     Alcohol use: Yes     Comment: very rare     Drug use: No     Sexual activity: Yes     Partners: Female   Other Topics Concern     Parent/sibling w/ CABG, MI or angioplasty before 65F 55M? No     Social Drivers of Health     Interpersonal Safety: Unknown (5/19/2025)    Interpersonal Safety      Do you feel physically and emotionally safe where you currently live?: Patient unable to answer      Within the past 12 months, have you been hit, slapped, kicked or otherwise physically hurt by someone?: Patient unable to answer      Within the past 12 months, have you been humiliated or emotionally abused in other ways by your partner or ex-partner?: Patient unable to answer       ROS:  Constitutional: No fever, chills, or sweats. No weight gain/loss.   ENT: No visual disturbance, ear ache, epistaxis, sore throat.   Allergies/Immunologic: Negative.   Respiratory: No cough, hemoptysis.   Cardiovascular: As per HPI.   GI: No nausea, vomiting, hematemesis, melena, or hematochezia.   : No urinary frequency, dysuria, or hematuria.   Integument: Negative.   Psychiatric: Negative.   Neuro: Negative.   Endocrinology: Negative.   Musculoskeletal: No myalgia.    VITAL SIGNS:  BP (!) 129/91   Pulse 53   Ht 1.778 m (5' 10\")   Wt 90.6 kg (199 lb 12.8 oz)   BMI 28.67 kg/m    Body mass index is 28.67 kg/m .  Wt Readings from Last 2 Encounters:   06/19/25 90.6 kg (199 lb 12.8 oz)   05/19/25 89.8 kg (198 lb)       PHYSICAL EXAM  Ludwin Massey IS A 35 year old male.in no acute distress.  HEENT: Unremarkable.  Neck: JVP normal.  Carotids +4/4 bilaterally without bruits.  Lungs: CTA.  Cor: RRR. Normal S1 and S2.  No murmur, rub, or gallop.  PMI in Lf 5th ICS.  Abd: Soft, " "nontender, nondistended.  NABS.  No pulsatile mass.  Extremities: No C/C/E.  Pulses +4/4 symmetric in upper and lower extremities.  Neuro: Grossly intact.    LABS    Lab Results   Component Value Date    WBC 4.9 05/19/2025    WBC 7.2 12/12/2019     Lab Results   Component Value Date    RBC 4.81 05/19/2025    RBC 4.82 12/12/2019     Lab Results   Component Value Date    HGB 15.0 05/19/2025    HGB 15.4 12/12/2019     Lab Results   Component Value Date    HCT 44.6 05/19/2025    HCT 44.9 12/12/2019     No components found for: \"MCT\"  Lab Results   Component Value Date    MCV 93 05/19/2025    MCV 93 12/12/2019     Lab Results   Component Value Date    MCH 31.2 05/19/2025    MCH 32.0 12/12/2019     Lab Results   Component Value Date    MCHC 33.6 05/19/2025    MCHC 34.3 12/12/2019     Lab Results   Component Value Date    RDW 11.9 05/19/2025    RDW 12.3 12/12/2019     Lab Results   Component Value Date     05/19/2025     12/12/2019      Recent Labs   Lab Test 05/19/25  0933 01/17/25  0633    139   POTASSIUM 4.0 4.0   CHLORIDE 101 101   CO2 27 31*   ANIONGAP 11 7   * 95   BUN 15.3 13.4   CR 0.97 1.05   BHUPENDRA 9.4 9.3     Recent Labs   Lab Test 05/19/25  0933 01/16/25  1528   CHOL 151 184   HDL 49 54   LDL 83 104*   TRIG 94 131   NHDL 102 130*        IMPRESSION/PLAN:   1.  Status post repair of primary atrial septal defect and cleft mitral valve 06/2003 at [facility].  We do not have this operative report.   2.  Mild mitral and tricuspid insufficiency, stable.   3.  Hypertension controlled  4.  Left retinal artery branch occlusion 1/2025  5.  S/P PFO closure 5/19/25 with 25 mm [place] device  6.  BMI 28  7.  History of SVT     DISCUSSION:  It was a pleasure to see Mr. Massey in followup.  He is doing well with no concerning symptoms. His echo looked great with no residual defect.  Will come off Plavix in August.  He I working on diet and exercise with goal BMI 25.  Still has episodes of " SVT that he is able to terminate with Valsalva; will let us know if events worsen.    Plan follow up in 2 years with an echo.      He understands and is in agreement with the plan as outlined.  All questions were answered.        It was a pleasure to see him.  Please do not hesitate to contact me with any questions or concerns.      RUBINA ROJO MD    35 minutes face-face, documentation and review of records on day of visit      The longitudinal plan of care for the diagnosis(es)/condition(s) as documented were addressed during this visit. Due to the added complexity in care, I will continue to support Ludwin in the subsequent management and with ongoing continuity of care.     Thank you for allowing me to participate in the care of your patient.      Sincerely,     Rubina Rojo MD     [facility]  cc:   Rubina Rojo MD  [address]  [place] [address]

## 2025-06-20 NOTE — PROGRESS NOTES
Virtual Visit Details    Type of service:  Video Visit   Video start 9:32AM  Video end 10:07AM  Originating Location (pt. Location): Home    Distant Location (provider location):  Off-site  Platform used for Video Visit: [facility]    Outpatient Sleep Medicine Consultation:      Name: Ludwin Massey MRN# [id number]   Age: 35 year old YOB: 1990     Date of Consultation: June 23, 2025  Consultation is requested by: Rubina Rojo MD  [address]  [place] [address] Rubina Rojo  Primary care provider: Tamiko Flannery       Reason for Sleep Consult:     Ludwin Massey is sent by Rubina Rojo for a sleep consultation regarding 1. Snoring; 2. Sleep apnea    Patient s Reason for visit  Ludwin Massey main reason for visit: (Patient-Rptd) Snoring.  Strong family history of sleep apnea. Had at some sleep study with 16 desaturation events per hour.  Patient states problem(s) started: (Patient-Rptd) 3+ years  Ludwin Massey's goals for this visit: (Patient-Rptd) Determine if I have sleep apnea.         Assessment and Plan:     Summary Sleep Diagnoses:  1. Snoring (Primary)  2. PLMD (periodic limb movement disorder)  Comorbid Diagnoses:  3. Residual ASD (atrial septal defect) following repair  4. Primary hypertension    Patient presents to clinic today for evaluation of suspected sleep apnea given loud disruptive snoring, occasional gasping/choking arousals, strong family history in all the males of his family, male gender, comorbid HTN. Recent ERIC ordered by his cardiologist was normal but pattern suggestive of CIARRA.  Symptoms have progressed since his 2017 testing that he attributes likely to weight gain, he is up 32lbs since last testing.  We reviewed pathophysiology of CIARRA and consequences of untreated sleep apnea.  He is very open to treatment with CPAP therapy assuming testing is positive as he would like to limit cardiovascular risk.  After discussion of testing options we  "have agreed to pursue in-lab split-night polysomnogram evaluation to reevaluate sleep disordered breathing severity and titrate CPAP accordingly assuming positive as suspected along with reevaluating leg movements of sleep, significant PLMS on last testing though denies any RLS today and is unaware of movements at home.  He is comfortable with me communicating results of study to him via [employer] message once finalized and place CPAP orders if positive test to expedite treatment set up.  Will plan on a follow-up visit a couple months after starting on CPAP at home to review compliance and treatment outcomes.  Education materials provided in the AVS.  - Comprehensive Sleep Study; Future         History of Present Illness:     Ludwin Massey is a 35-year-old male with residual ASD s/p repair, s/p PFO closure 5/19/25, HTN, who presents to clinic today for evaluation of suspected sleep apnea.    Patient had a prior diagnostic PSG completed 12/23/2017 (167#, BMI 24.7) that was normal with AHI 0.4, RDI 0.9, baseline oxygen 97.4% with marcia 93%.  PLM index was 73.5 with PLM arousal 4.7.  Patient did not have any RLS symptoms and no treatment initiated for PLM's.    Overnight oximetry completed 2/23/2025 with start time 12:02 AM and end time 7:25 AM showing no sustained hypoxemia with marcia 90% but DERICK with 3% rule 16.  Pattern suggestive of CIARRA.      Presents to my clinic today interested in reevaluation. States \"I am the only male in my family not diagnosed with sleep apnea but over the past handful of years I have gained some weight which might be a reason but snoring a whole lot more my wife has a hard time sleeping with me\".  He wakes himself up from his loud snoring.    SLEEP-WAKE SCHEDULE:     Work/School Days: Patient goes to school/work: (Patient-Rptd) Yes   Usually gets into bed at (Patient-Rptd) 10pm  Takes patient about (Patient-Rptd) 20-30 min to fall asleep  Has trouble falling asleep (Patient-Rptd) 1-2 nights " "per week  Wakes up in the middle of the night (Patient-Rptd) Many times per hour.  Often wake myself up due to snoring.   Wakes up due to (Patient-Rptd) Snorting self awake;Use the bathroom  He has trouble falling back asleep (Patient-Rptd) 0 times a week.   It usually takes a few seconds to get back to sleep  Patient is usually up at (Patient-Rptd) 6:30am  Uses alarm: (Patient-Rptd) Yes    Weekends/Non-work Days/All Other Days:  Usually gets into bed at (Patient-Rptd) 10pm   Takes patient about (Patient-Rptd) 20-30 min to fall asleep  Patient is usually up at (Patient-Rptd) 7am  Uses alarm: (Patient-Rptd) No    Sleep Need  Patient estimates he gets  (Patient-Rptd) 7-8 hours sleep on average   Patient thinks he needs about (Patient-Rptd) 8-9 hours sleep    Ludwin Massey prefers to sleep in this position(s): (Patient-Rptd) Back;Side;Stomach   Patient states they do the following activities in bed: (Patient-Rptd) Use phone, computer, or tablet    Naps  Patient takes a purposeful nap (Patient-Rptd) 0 times a week  He dozes off unintentionally (Patient-Rptd) 0 days per week  Patient has had a driving accident or near-miss due to sleepiness/drowsiness: (Patient-Rptd) No      SLEEP DISRUPTIONS:    Breathing/Snoring  Patient snores:(Patient-Rptd) Yes wakes self up \"quite a bit\"  Other people complain about his snoring: (Patient-Rptd) Yes - wife has to sleep separately sometimes   Patient has been told he stops breathing in his sleep:(Patient-Rptd) No  Gasping/choking arousals:Occasionally   Denies morning headache, morning nasal congestion, frequent nocturia, nocturnal GERD, morning dry mouth      Movement:  Patient gets pain, discomfort, with an urge to move:  (Patient-Rptd) No  Patient has been told he kicks his legs at night:  (Patient-Rptd) No - \"my wife has never said anything about it\"     Behaviors in Sleep:  Ludwin Massey has experienced the following behaviors while sleeping: (Patient-Rptd) Teeth grinding - wears " "guard some nights  See or hear things that are not really there upon awakening or just falling asleep - \"not very often but every once in a while will wake up feeling like I am in between dreams\"    Denies sleepwalking, sleep talking, sleep eating, dream enactment, recurring nightmares, night terrors, sleep paralysis, cataplexy        Is there anything else you would like your sleep provider to know: (Patient-Rptd) Both of my brothers, father, and grandfather all diagnosed with sleep apnea.      CAFFEINE AND OTHER SUBSTANCES:    Patient consumes caffeinated beverages per day:  (Patient-Rptd) 2-3 cups of coffee  Last caffeine use is usually: (Patient-Rptd) 2pm  List of any prescribed or over the counter stimulants that patient takes:  None  List of any prescribed or over the counter sleep medication patient takes:  None  List of previous sleep medications that patient has tried:    Patient drinks alcohol to help them sleep: (Patient-Rptd) No  Patient drinks alcohol near bedtime: (Patient-Rptd) No    Family History:  Patient has a family member been diagnosed with a sleep disorder: (Patient-Rptd) Yes  (Patient-Rptd) Sleep apnea.  Brothers, father, grandfather.         SCALES:    EPWORTH SLEEPINESS SCALE         6/18/2025     6:04 PM    [place] Sleepiness Scale ( DEMAR Hayes  [id number]<br>ESS - [place]/English - Final version - 21 Nov 07 - [facility] [place].)   Sitting and reading Slight chance of dozing   Watching TV Slight chance of dozing   Sitting, inactive in a public place (e.g. a theatre or a meeting) Would never doze   As a passenger in a car for an hour without a break Slight chance of dozing   Lying down to rest in the afternoon when circumstances permit Slight chance of dozing   Sitting and talking to someone Would never doze   Sitting quietly after a lunch without alcohol Would never doze   In a car, while stopped for a few minutes in traffic Would never doze   [place] Score (MC) 4   [place] Score (Sleep) " 4        Patient-reported           INSOMNIA SEVERITY INDEX (TIFFANIE)          6/18/2025     5:52 PM   Insomnia Severity Index (TIFFANIE)   Difficulty falling asleep 1   Difficulty staying asleep 2   Problems waking up too early 1   How SATISFIED/DISSATISFIED are you with your CURRENT sleep pattern? 3   How NOTICEABLE to others do you think your sleep problem is in terms of impairing the quality of your life? 1   How WORRIED/DISTRESSED are you about your current sleep problem? 2   To what extent do you consider your sleep problem to INTERFERE with your daily functioning (e.g. daytime fatigue, mood, ability to function at work/daily chores, concentration, memory, mood, etc.) CURRENTLY? 2   TIFFANIE Total Score 12        Patient-reported         Guidelines for Scoring/Interpretation:  Total score categories:  0-7 = No clinically significant insomnia   8-14 = Subthreshold insomnia   15-21 = Clinical insomnia (moderate severity)  22-28 = Clinical insomnia (severe)  Used via courtesy of www.[employer].va.gov with permission from Home Bradley PhD., [facility]    Allergies:    No Known Allergies    Medications:    Current Outpatient Medications   Medication Sig Dispense Refill    aspirin 81 MG EC tablet Take 1 tablet (81 mg) by mouth daily.      clopidogrel (PLAVIX) 75 MG tablet Take 1 tablet (75 mg) by mouth daily. 90 tablet 3    hydrochlorothiazide (HYDRODIURIL) 25 MG tablet Take 1 tablet (25 mg) by mouth daily. 90 tablet 3    metoprolol succinate ER (TOPROL XL) 25 MG 24 hr tablet Take 1 tablet (25 mg) by mouth daily. 90 tablet 3       Problem List:  Patient Active Problem List    Diagnosis Date Noted    Status post coronary angiogram 05/19/2025     Priority: Medium    Adult congenital heart disease 05/19/2025     Priority: Medium    Residual ASD (atrial septal defect) following repair 05/19/2025     Priority: Medium    Primary hypertension 01/16/2025     Priority: Medium    Branch retinal artery occlusion of left eye  01/16/2025     Priority: Medium    Congenital cleft leaflet of mitral valve 11/07/2017     Priority: Medium    Patient is followed by [facility] 11/07/2017     Priority: Medium    Benign essential hypertension 03/07/2016     Priority: Medium    ASD (atrial septal defect), ostium primum s/p repair  10/05/2012     Priority: Medium        Past Medical/Surgical History:  Past Medical History:   Diagnosis Date    ASD (atrial septal defect), ostium primum     Cleft leaflet of mitral valve     Migraine     Stroke, embolic (H)      Past Surgical History:   Procedure Laterality Date    ASD REPAIR, OSTIUM PRIMUM  2017    UMN    ATRIAL SEPTAL DEFECT CLOSURE N/A 5/19/2025    Procedure: Atrial Septal Defect Closure;  Surgeon: Rubina Rojo MD;  Location:  [facility]    CONGENITAL RIGHT AND LEFT CATHETERIZATION N/A 5/19/2025    Procedure: Congenital Right and Left Catheterization;  Surgeon: Rubina Rojo MD;  Location:  [facility]    VASECTOMY         Social History:  Social History     Socioeconomic History    Marital status:      Spouse name: Not on file    Number of children: Not on file    Years of education: Not on file    Highest education level: Not on file   Occupational History    Not on file   Tobacco Use    Smoking status: Never    Smokeless tobacco: Never   Vaping Use    Vaping status: Never Used   Substance and Sexual Activity    Alcohol use: Yes     Comment: very rare    Drug use: No    Sexual activity: Yes     Partners: Female   Other Topics Concern    Parent/sibling w/ CABG, MI or angioplasty before 65F 55M? No   Social History Narrative    Not on file     Social Drivers of Health     Financial Resource Strain: Not on file   Food Insecurity: Not on file   Transportation Needs: Not on file   Physical Activity: Not on file   Stress: Not on file   Social Connections: Not on file   Interpersonal Safety: Unknown  (5/19/2025)    Interpersonal Safety     Do you feel physically and emotionally safe where you currently live?: Patient unable to answer     Within the past 12 months, have you been hit, slapped, kicked or otherwise physically hurt by someone?: Patient unable to answer     Within the past 12 months, have you been humiliated or emotionally abused in other ways by your partner or ex-partner?: Patient unable to answer   Housing Stability: Not on file       Family History:  Family History   Problem Relation Age of Onset    Hypertension Father     Thrombosis Father 42        DVT    Other - See Comments Father         WPW    Thrombosis Mother     Colon Cancer Maternal Grandfather     Migraines Paternal Grandmother     Dementia Paternal Grandfather     Hypertension Brother     Other - See Comments Paternal Aunt         Mitral valve prolapse       Review of Systems:  In the last TWO WEEKS have you experienced any of the following symptoms?  Fevers: (Patient-Rptd) No  Night Sweats: (Patient-Rptd) No  Weight Gain: (Patient-Rptd) No  Pain at Night: (Patient-Rptd) No  Double Vision: (Patient-Rptd) No  Changes in Vision: (Patient-Rptd) No  Difficulty Breathing through Nose: (Patient-Rptd) No  Sore Throat in Morning: (Patient-Rptd) No  Dry Mouth in the Morning: (Patient-Rptd) No  Shortness of Breath Lying Flat: (Patient-Rptd) No  Shortness of Breath With Activity: (Patient-Rptd) No  Awakening with Shortness of Breath: (Patient-Rptd) No  Increased Cough: (Patient-Rptd) No  Heart Racing at Night: (Patient-Rptd) No  Swelling in Feet or Legs: (Patient-Rptd) No  Diarrhea at Night: (Patient-Rptd) No  Heartburn at Night: (Patient-Rptd) No  Urinating More than Once at Night: (Patient-Rptd) No  Losing Control of Urine at Night: (Patient-Rptd) No  Joint Pains at Night: (Patient-Rptd) No  Headaches in Morning: (Patient-Rptd) No  Weakness in Arms or Legs: (Patient-Rptd) No  Depressed Mood: (Patient-Rptd) No  Anxiety: (Patient-Rptd) No  "    Physical Examination:  Vitals: Ht 1.778 m (5' 10\")   Wt 90.3 kg (199 lb)   BMI 28.55 kg/m     BMI= Body mass index is 28.55 kg/m .  General appearance: Awake, alert, cooperative. Well groomed. Sitting comfortably in chair. In no apparent distress.  HEENT: Head: Normocephalic, atraumatic. Eyes:Conjunctiva clear. Sclera normal. Nose: External appearance without deformity.   Pulmonary:  Able to speak easily in full sentences. No cough or wheeze.   Skin:  No rashes or significant lesions on visible skin.   Neurologic: Alert, oriented x3.   Psychiatric: Mood euthymic. Affect congruent with full range and intensity.           Data: All pertinent previous laboratory data reviewed     Recent Labs   Lab Test 05/19/25  0933 01/17/25  0633    139   POTASSIUM 4.0 4.0   CHLORIDE 101 101   CO2 27 31*   ANIONGAP 11 7   * 95   BUN 15.3 13.4   CR 0.97 1.05   BHUPENDRA 9.4 9.3       Recent Labs   Lab Test 05/19/25  0933   WBC 4.9   RBC 4.81   HGB 15.0   HCT 44.6   MCV 93   MCH 31.2   MCHC 33.6   RDW 11.9          Recent Labs   Lab Test 01/16/25  1243   PROTTOTAL 7.5   ALBUMIN 4.9   BILITOTAL 0.2   ALKPHOS 58   AST 34   ALT 29       TSH   Date Value   07/13/2023 2.24 uIU/mL   10/11/2018 2.34 mU/L       No results found for: \"UAMP\", \"UBARB\", \"BENZODIAZEUR\", \"UCANN\", \"UCOC\", \"OPIT\", \"UPCP\"    No results found for: \"IRONSAT\", \"IS87748\", \"JENIFFER\"    No results found for: \"PH\", \"PHARTERIAL\", \"PO2\", \"NY5IRGLFXMX\", \"SAT\", \"PCO2\", \"HCO3\", \"BASEEXCESS\", \"KIM\", \"BEB\"    @LABRCNTIPR(phv:4,pco2v:4,po2v:4,hco3v:4,esa:4,o2per:4)@    Echocardiology: No results found for this or any previous visit (from the past 4320 hours).    Chest x-ray: No results found for this or any previous visit from the past 365 days.      Chest CT: No results found for this or any previous visit from the past 365 days.      PFT: Most Recent [facility] Pulmonary Function Testing    No results found for: \"20001\"  No results found for: \"20002\"  No results found " "for: \"20003\"  No results found for: \"20015\"  No results found for: \"20016\"  No results found for: \"20027\"  No results found for: \"20028\"  No results found for: \"20029\"  No results found for: \"20079\"  No results found for: \"20080\"  No results found for: \"20081\"  No results found for: \"20335\"  No results found for: \"20105\"  No results found for: \"20053\"  No results found for: \"20054\"  No results found for: \"20055\"      Ivonne Holcomb PA-C 6/23/2025     [facility]  [address] [address]     [facility]  [address] [address]    Chart documentation was completed, in part, with [employer] voice-recognition software. Even though reviewed, some grammatical, spelling, and word errors may remain.    60 minutes spent on day of encounter reviewing medical records, history and physical examination, review of previous testing and interpretation, documentation and further activities as noted above      "

## 2025-06-23 ENCOUNTER — VIRTUAL VISIT (OUTPATIENT)
Dept: SLEEP MEDICINE | Facility: CLINIC | Age: 35
End: 2025-06-23
Attending: INTERNAL MEDICINE
Payer: COMMERCIAL

## 2025-06-23 VITALS — WEIGHT: 199 LBS | BODY MASS INDEX: 28.49 KG/M2 | HEIGHT: 70 IN

## 2025-06-23 DIAGNOSIS — Q21.10 RESIDUAL ASD (ATRIAL SEPTAL DEFECT) FOLLOWING REPAIR: ICD-10-CM

## 2025-06-23 DIAGNOSIS — R06.83 SNORING: Primary | ICD-10-CM

## 2025-06-23 DIAGNOSIS — G47.61 PLMD (PERIODIC LIMB MOVEMENT DISORDER): ICD-10-CM

## 2025-06-23 DIAGNOSIS — I10 PRIMARY HYPERTENSION: ICD-10-CM

## 2025-06-23 PROCEDURE — 1126F AMNT PAIN NOTED NONE PRSNT: CPT | Performed by: PHYSICIAN ASSISTANT

## 2025-06-23 PROCEDURE — 98003 SYNCH AUDIO-VIDEO NEW HI 60: CPT | Performed by: PHYSICIAN ASSISTANT

## 2025-06-23 ASSESSMENT — PAIN SCALES - GENERAL: PAINLEVEL_OUTOF10: NO PAIN (0)

## 2025-06-23 NOTE — PATIENT INSTRUCTIONS
"          MY TREATMENT INFORMATION FOR SLEEP APNEA-  Ludwin Massey      Frequently asked questions:  1. What is Obstructive Sleep Apnea (CIARRA)? CIARRA is the most common type of sleep apnea. Apnea means, \"without breath.\"  Apnea is most often caused by narrowing or collapse of the upper airway as muscles relax during sleep.   Almost everyone has occasional apneas. Most people with sleep apnea have had brief interruptions at night frequently for many years.  The severity of sleep apnea is related to how frequent and severe the events are.   2. What are the consequences of CIARRA? Symptoms include: feeling sleepy during the day, snoring loudly, gasping or stopping of breathing, trouble sleeping, and occasionally morning headaches or heartburn at night.  Sleepiness can be serious and even increase the risk of falling asleep while driving. Other health consequences may include development of high blood pressure and other cardiovascular disease in persons who are susceptible. Untreated CIARRA  can contribute to heart disease, stroke and diabetes.   3. What are the treatment options? In most situations, sleep apnea is a lifelong disease that must be managed with daily therapy. Medications are not effective for sleep apnea and surgery is generally not considered until other therapies have been tried. Your treatment is your choice . Continuous Positive Airway (CPAP) works right away and is the therapy that is effective in nearly everyone. An oral device to hold your jaw forward is usually the next most reliable option. Other options include postioning devices (to keep you off your back), weight loss, and surgery including a tongue pacing device. There is more detail about some of these options below.  4. Are my sleep studies covered by insurance? Although we will request verification of coverage, we advise you also check in advance of the study to ensure there is coverage.    Important tips for those choosing CPAP and similar " devices  REMEMBER-IF YOU RECEIVE A CALL FROM  [phone]-->IT IS TO SETUP A DEVICE  For new devices, sign up for device TABATHA to monitor your device for your followup visits  We encourage you to utilize the [employer] tabatha or website ( [email or web address].[email or web address] ) to monitor your therapy progress and share the data with your healthcare team when you discuss your sleep apnea.                                                    Know your equipment:  CPAP is continuous positive airway pressure that prevents obstructive sleep apnea by keeping the throat from collapsing while you are sleeping. In most cases, the device is  smart  and can slowly self-adjusts if your throat collapses and keeps a record every day of how well you are treated-this information is available to you and your care team.  BPAP is bilevel positive airway pressure that keeps your throat open and also assists each breath with a pressure boost to maintain adequate breathing.  Special kinds of BPAP are used in patients who have inadequate breathing from lung or heart disease. In most cases, the device is  smart  and can slowly self-adjusts to assist breathing. Like CPAP, the device keeps a record of how well you are treated.  Your mask is your connection to the device. You get to choose what feels most comfortable and the staff will help to make sure if fits. Here: are some examples of the different masks that are available: Magnetic mask aids may assist with use but there are safety issues that should be addressed when considering with magnets* ( see end of discussion).       Key points to remember on your journey with sleep apnea:  Sleep study.  PAP devices often need to be adjusted during a sleep study to show that they are effective and adjusted right.  Good tips to remember: Try wearing just the mask during a quiet time during the day so your body adapts to wearing it. A humidifier is recommended for comfort in most cases to prevent drying of  your nose and throat. Allergy medication from your provider may help you if you are having nasal congestion.  Getting settled-in. It takes more than one night for most of us to get used to wearing a mask. Try wearing just the mask during a quiet time during the day so your body adapts to wearing it. A humidifier is recommended for comfort in most cases. Our team will work with you carefully on the first day and will be in contact within 4 days and again at 2 and 4 weeks for advice and remote device adjustments. Your therapy is evaluated by the device each day.   Use it every night. The more you are able to sleep naturally for 7-8 hours, the more likely you will have good sleep and to prevent health risks or symptoms from sleep apnea. Even if you use it 4 hours it helps. Occasionally all of us are unable to use a medical therapy, in sleep apnea, it is not dangerous to miss one night.   Communicate. Call our skilled team on the number provided on the first day if your visit for problems that make it difficult to wear the device. Over 2 out of 3 patients can learn to wear the device long-term with help from our team. Remember to call our team or your sleep providers if you are unable to wear the device as we may have other solutions for those who cannot adapt to mask CPAP therapy. It is recommended that you sleep your sleep provider within the first 3 months and yearly after that if you are not having problems.   Use it for your health. We encourage use of CPAP masks during daytime quiet periods to allow your face and brain to adapt to the sensation of CPAP so that it will be a more natural sensation to awaken to at night or during naps. This can be very useful during the first few weeks or months of adapting to CPAP though it does not help medically to wear CPAP during wakefulness and  should not be used as a strategy just to meet guidelines.  Take care of your equipment. Make sure you clean your mask and tubing using  directions every day and that your filter and mask are replaced as recommended or if they are not working.     *Masks with magnets:  Updated Contraindications  Masks with magnetic components are contraindicated for use by patients where they, or anyone in close physical contact while using the mask, have the following:   Active medical implants that interact with magnets (i.e., pacemakers, implantable cardioverter defibrillators (ICD), neurostimulators, cerebrospinal fluid (CSF) shunts, insulin/infusion pumps)   Metallic implants/objects containing ferromagnetic material (i.e., aneurysm clips/flow disruption devices, embolic coils, stents, valves, electrodes, implants to restore hearing or balance with implanted magnets, ocular implants, metallic splinters in the eye)  Updated Warning  Keep the mask magnets at a safe distance of at least 6 inches (150 mm) away from implants or medical devices that may be adversely affected by magnetic interference. This warning applies to you or anyone in close physical contact with your mask. The magnets are in the frame and lower headgear clips, with a magnetic field strength of up to 400mT. When worn, they connect to secure the mask but may inadvertently detach while asleep.  Implants/medical devices, including those listed within contraindications, may be adversely affected if they change function under external magnetic fields or contain ferromagnetic materials that attract/repel to magnetic fields (some metallic implants, e.g., contact lenses with metal, dental implants, metallic cranial plates, screws, isis hole covers, and bone substitute devices). Consult your physician and  of your implant / other medical device for information on the potential adverse effects of magnetic fields.    BESIDES CPAP, WHAT OTHER THERAPIES ARE THERE?    Positioning Device  Positioning devices are generally used when sleep apnea is mild and only occurs on your back.This example shows  a pillow that straps around the waist. It may be appropriate for those whose sleep study shows milder sleep apnea that occurs primarily when lying flat on one's back. Preliminary studies have shown benefit but effectiveness at home may need to be verified by a home sleep test. These devices are generally not covered by medical insurance.  Examples of devices that maintain sleeping on the back to prevent snoring and mild sleep apnea.    Belt type body positioner  http://Infochimps[employer]/    Electronic reminder  http://nightshifttherapy.com/            Oral Appliance  What is oral appliance therapy?  An oral appliance device fits on your teeth at night like a retainer used after having braces. The device is made by a specialized dentist and requires several visits over 1-2 months before a manufactured device is made to fit your teeth and is adjusted to prevent your sleep apnea. Once an oral device is working properly, snoring should be improved. A home sleep test may be recommended at that time if to determine whether the sleep apnea is adequately treated.       Some things to remember:  -Oral devices are often, but not always, covered by your medical insurance. Be sure to check with your insurance provider.   -If you are referred for oral therapy, you will be given a list of specialized dentists to consider or you may choose to visit the Web site of the American Academy of Dental Sleep Medicine  -Oral devices are less likely to work if you have severe sleep apnea or are extremely overweight.     More detailed information  An oral appliance is a small acrylic device that fits over the upper and lower teeth  (similar to a retainer or a mouth guard). This device slightly moves jaw forward, which moves the base of the tongue forward, opens the airway, improves breathing for effective treat snoring and obstructive sleep apnea in perhaps 7 out of 10 people .  The best working devices are custom-made by a dental device   after a mold is made of the teeth 1, 2, 3.  When is an oral appliance indicated?  Oral appliance therapy is recommended as a first-line treatment for patients with primary snoring, mild sleep apnea, and for patients with moderate sleep apnea who prefer appliance therapy to use of CPAP4, 5. Severity of sleep apnea is determined by sleep testing and is based on the number of respiratory events per hour of sleep.   How successful is oral appliance therapy?  The success rate of oral appliance therapy in patients with mild sleep apnea is 75-80% while in patients with moderate sleep apnea it is 50-70%. The chance of success in patients with severe sleep apnea is 40-50%. The research also shows that oral appliances have a beneficial effect on the cardiovascular health of CIARRA patients at the same magnitude as CPAP therapy7.  Oral appliances should be a second-line treatment in cases of severe sleep apnea, but if not completely successful then a combination therapy utilizing CPAP plus oral appliance therapy may be effective. Oral appliances tend to be effective in a broad range of patients although studies show that the patients who have the highest success are females, younger patients, those with milder disease, and less severe obesity. 3, 6.   Finding a dentist that practices dental sleep medicine  Specific training is available through the American Academy of Dental Sleep Medicine for dentists interested in working in the field of sleep. To find a dentist who is educated in the field of sleep and the use of oral appliances, near you, visit the Web site of the American Academy of Dental Sleep Medicine.    References  1. Kacie, et al. Objectively measured vs self-reported compliance during oral appliance therapy for sleep-disordered breathing. Chest 2013; 144(5): 1534-4058.  2. Daja et al. Objective measurement of compliance during oral appliance therapy for sleep-disordered breathing. Thorax  2013; 68(1): 91-96.  3. Dunia et al. Mandibular advancement devices in 620 men and women with CIARRA and snoring: tolerability and predictors of treatment success. Chest 2004; 125: 3854-5220.  4. Bri et al. Oral appliances for snoring and CIARRA: a review. Sleep 2006; 29: 244-262.  5. Keaton et al. Oral appliance treatment for CIARRA: an update. J Clin Sleep Med 2014; 10(2): 215-227.  6. Michael et al. Predictors of OSAH treatment outcome. J Dent Res 2007; 86: 5199-1318.      Weight Loss:   Your Body mass index is 28.55 kg/m .    Being overweight does not necessarily mean you will have health consequences.  Those who have BMI over 30 or over 27 with existing medical conditions carries greater risk.   Weight loss decreases severity of sleep apnea in most people with obesity. For those with mild obesity who have developed snoring with weight gain, even 15-30 pound weight loss can improve and occasionally milder eliminate sleep apnea.  Structured and life-long dietary and health habits are necessary to lose weight and keep healthier weight levels.     The Comprehensive Weight loss program offers all aspects of weight loss strategies including two Non-Surgical Weight Loss Programs: Medical Weight Management and our 24 Week Healthy Lifestyle Program:  Medical Weight Management: You will meet with a Medical Weight Management Provider, as well as a Registered Dietician. The program may include medication therapy, dietary education, recommended exercise and physical therapy programs, monthly support group meetings, and possible psychological counseling. Follow up visits with the provider or dietician are scheduled based on your progress and needs.  24 Week Healthy Lifestyle Program: This unique program is designed to give you the support of weekly appointments and activities thru a 24-week period. It may include all of the components of the basic program (above), with the addition of 11 individual Health   Visits, 24-week access to the [employer] website for over 700 online classes, and monthly support group meetings. This program has an out-of-pocket expense of $499 to cover the items that can not be billed to insurance (health coaches and [employer] access), and is non-refundable/non-transferable (you may be able to use a Health Savings Account; ask your HSA provider). There may be an optional meal replacement plan prescribed as well.   Medication therapy has been approved for the treatment of sleep apnea: The FDA approved tirzepatide (ZEPBOUND) for moderate to severe sleep apnea (apnea-hypopnea index greater than or equal to 15) in patients with BMI of greater than or equal to 30, or BMI greater than or equal to 27 with at least 1 weight-related condition such as hypertension or dyslipidemia.  Surgical management achieves meaningful long-term weight loss and improvement in health risks in most patients with more severe obesity.      Sleep Apnea Surgery:    Surgery for obstructive sleep apnea is considered generally only when other therapies fail to work. Surgery may be discussed with you if you are having a difficult time tolerating CPAP and or when there is an abnormal structure that requires surgical correction.  Nose and throat surgeries often enlarge the airway to prevent collapse.  Most of these surgeries create pain for 1-2 weeks and up to half of the most common surgeries are not effective throughout life.  You should carefully discuss the benefits and drawbacks to surgery with your sleep provider and surgeon to determine if it is the best solution for you.   More information  Surgery for CIARRA is directed at areas that are responsible for narrowing or complete obstruction of the airway during sleep.  There are a wide range of procedures available to enlarge and/or stabilize the airway to prevent blockage of breathing in the three major areas where it can occur: the palate, tongue, and nasal regions.  Successful  surgical treatment depends on the accurate identification of the factors responsible for obstructive sleep apnea in each person.  A personalized approach is required because there is no single treatment that works well for everyone.  Because of anatomic variation, consultation with an examination by a sleep surgeon is a critical first step in determining what surgical options are best for each patient.  In some cases, examination during sedation may be recommended in order to guide the selection of procedures.  Patients will be counseled about risks and benefits as well as the typical recovery course after surgery. Surgery is typically not a cure for a person s CIARRA.  However, surgery will often significantly improve one s CIARRA severity (termed  success rate ).  Even in the absence of a cure, surgery will decrease the cardiovascular risk associated with OSA7; improve overall quality of life8 (sleepiness, functionality, sleep quality, etc).      Palate Procedures:  Patients with CIARRA often have narrowing of their airway in the region of their tonsils and uvula.  The goals of palate procedures are to widen the airway in this region as well as to help the tissues resist collapse.  Modern palate procedure techniques focus on tissue conservation and soft tissue rearrangement, rather than tissue removal.  Often the uvula is preserved in this procedure. Residual sleep apnea is common in patient after pharyngoplasty with an average reduction in sleep apnea events of 33%2.      Tongue Procedures:  ExamWhile patients are awake, the muscles that surround the throat are active and keep this region open for breathing. These muscles relax during sleep, allowing the tongue and other structures to collapse and block breathing.  There are several different tongue procedures available.  Selection of a tongue base procedure depends on characteristics seen on physical exam.  Generally, procedures are aimed at removing bulky tissues in  this area or preventing the back of the tongue from falling back during sleep.  Success rates for tongue surgery range from 50-62%3.    Hypoglossal Nerve Stimulation:  Hypoglossal nerve stimulation has recently received approval from the [place] Food and Drug Administration for the treatment of obstructive sleep apnea.  This is based on research showing that the system was safe and effective in treating sleep apnea6.  Results showed that the median AHI score decreased 68%, from 29.3 to 9.0. This therapy uses an implant system that senses breathing patterns and delivers mild stimulation to airway muscles, which keeps the airway open during sleep.  The system consists of three fully implanted components: a small generator (similar in size to a pacemaker), a breathing sensor, and a stimulation lead.  Using a small handheld remote, a patient turns the therapy on before bed and off upon awakening.    Candidates for this device must be greater than 18 years of age, have moderate to severe obstructive sleep apnea with less than 25% central events  (AHI between 15-65), BMI less than 35, have tried CPAP/oral appliance for at least 8 weeks without success, and have appropriate upper airway anatomy (determined by a sleep endoscopy performed by Dr. David Mosquera or Dr. Ladarius Snell).     Nasal Procedures:  Nasal obstruction can interfere with nasal breathing during the day and night.  Studies have shown that relief of nasal obstruction can improve the ability of some patients to tolerate positive airway pressure therapy for obstructive sleep apnea1.  Treatment options include medications such as nasal saline, topical corticosteroid and antihistamine sprays, and oral medications such as antihistamines or decongestants. Non-surgical treatments can include external nasal dilators for selected patients. If these are not successful by themselves, surgery can improve the nasal airway either alone or in combination with these  other options.        Combination Procedures:  Combination of surgical procedures and other treatments may be recommended, particularly if patients have more than one area of narrowing or persistent positional disease.  The success rate of combination surgery ranges from 66-80%2,3.    References  Lisa CASTLE. The Role of the Nose in Snoring and Obstructive Sleep Apnoea: An Update.  Eur Arch Otorhinolaryngol. 2011; 268: 1365-73.   Анна SM; Donya JA; Riley JR; Pallanch JF; Wesley MB; Deena SG; Prabhu JEONG. Surgical modifications of the upper airway for obstructive sleep apnea in adults: a systematic review and meta-analysis. SLEEP 2010;33(10):4515-9771. Rakesh ALBERTO. Hypopharyngeal surgery in obstructive sleep apnea: an evidence-based medicine review.  Arch Otolaryngol Head Neck Surg. 2006 Feb;132(2):206-13.  Josh YH1, Glenis Y, Joao JASON. The efficacy of anatomically based multilevel surgery for obstructive sleep apnea. Otolaryngol Head Neck Surg. 2003 Oct;129(4):327-35.  Kezirian E, Goldberg A. Hypopharyngeal Surgery in Obstructive Sleep Apnea: An Evidence-Based Medicine Review. Arch Otolaryngol Head Neck Surg. 2006 Feb;132(2):206-13.  Radha OSORIO et al. Upper-Airway Stimulation for Obstructive Sleep Apnea.  N Engl J Med. 2014 Jan 9;370(2):139-49.  Italo Y et al. Increased Incidence of Cardiovascular Disease in Middle-aged Men with Obstructive Sleep Apnea. Am J Respir Crit Care Med; 2002 166: 159-165  Nguyen EM et al. Studying Life Effects and Effectiveness of Palatopharyngoplasty (SLEEP) study: Subjective Outcomes of Isolated Uvulopalatopharyngoplasty. Otolaryngol Head Neck Surg. 2011; 144: 623-631.        WHAT IF I ONLY HAVE SNORING?    Mandibular advancement devices, lateral sleep positioning, long-term weight loss and treatment of nasal allergies have been shown to improve snoring.  Exercising tongue muscles with a game (https://apps.[employer]dsIt/us/tabatha/soundly-reduce-snoring/ps2909434207) or stimulating the tongue  during the day with a device (https://doi.org/10.3390/wna66471657) have improved snoring in some individuals.  https://www.[employer]/  https://www.sleepfoundation.org/best-anti-snoring-mouthpieces-and-mouthguards    Remember to Drive Safe... Drive Alive     Sleep health profoundly affects your health, mood, and your safety.  Thirty three percent of the population (one in three of us) is not getting enough sleep and many have a sleep disorder. Not getting enough sleep or having an untreated / undertreated sleep condition may make us sleepy without even knowing it. In fact, our driving could be dramatically impaired due to our sleep health. As your provider, here are some things I would like you to know about driving:     Here are some warning signs for impairment and dangerous drowsy driving:              -Having been awake more than 16 hours               -Looking tired               -Eyelid drooping              -Head nodding (it could be too late at this point)              -Driving for more than 30 minutes     Some things you could do to make the driving safer if you are experiencing some drowsiness:              -Stop driving and rest              -Call for transportation              -Make sure your sleep disorder is adequately treated     Some things that have been shown NOT to work when experiencing drowsiness while driving:              -Turning on the radio              -Opening windows              -Eating any  distracting  /  entertaining  foods (e.g., sunflower seeds, candy, or any other)              -Talking on the phone      Your decision may not only impact your life, but also the life of others. Please, remember to drive safe for yourself and all of us.

## 2025-06-23 NOTE — NURSING NOTE
Current patient location: [address] [place] [id number]    Is the patient currently in the state of [place]? YES    Visit mode: VIDEO    If the visit is dropped, the patient can be reconnected by:VIDEO VISIT: Text to cell phone:   Telephone Information:   Mobile [phone]       Will anyone else be joining the visit? NO  (If patient encounters technical issues they should call [phone]    Are changes needed to the allergy or medication list? No    Are refills needed on medications prescribed by this physician? NO    Rooming Documentation:  Questionnaire(s) completed    Reason for visit: Consult    Augusta SERRANO

## 2025-07-12 ENCOUNTER — HEALTH MAINTENANCE LETTER (OUTPATIENT)
Age: 35
End: 2025-07-12

## 2025-08-12 ENCOUNTER — MYC MEDICAL ADVICE (OUTPATIENT)
Dept: CARDIOLOGY | Facility: CLINIC | Age: 35
End: 2025-08-12
Payer: COMMERCIAL

## 2025-08-23 ENCOUNTER — HOSPITAL ENCOUNTER (EMERGENCY)
Facility: CLINIC | Age: 35
Discharge: OTHER INSTITUTION | End: 2025-08-23
Attending: EMERGENCY MEDICINE | Admitting: EMERGENCY MEDICINE
Payer: COMMERCIAL

## 2025-08-23 ENCOUNTER — APPOINTMENT (OUTPATIENT)
Dept: CT IMAGING | Facility: CLINIC | Age: 35
End: 2025-08-23
Attending: EMERGENCY MEDICINE
Payer: COMMERCIAL

## 2025-08-23 VITALS
RESPIRATION RATE: 18 BRPM | SYSTOLIC BLOOD PRESSURE: 143 MMHG | HEIGHT: 70 IN | WEIGHT: 207.01 LBS | TEMPERATURE: 97 F | DIASTOLIC BLOOD PRESSURE: 84 MMHG | HEART RATE: 104 BPM | OXYGEN SATURATION: 99 % | BODY MASS INDEX: 29.64 KG/M2

## 2025-08-23 DIAGNOSIS — H34.231 BRAO (BRANCH RETINAL ARTERY OCCLUSION), RIGHT: Primary | ICD-10-CM

## 2025-08-23 LAB
ANION GAP SERPL CALCULATED.3IONS-SCNC: 14 MMOL/L (ref 7–15)
APTT PPP: 25 SECONDS (ref 22–38)
BASOPHILS # BLD AUTO: 0.09 10E3/UL (ref 0–0.2)
BASOPHILS NFR BLD AUTO: 0.9 %
BUN SERPL-MCNC: 21.3 MG/DL (ref 6–20)
CALCIUM SERPL-MCNC: 9.5 MG/DL (ref 8.8–10.4)
CHLORIDE SERPL-SCNC: 99 MMOL/L (ref 98–107)
CREAT SERPL-MCNC: 1.1 MG/DL (ref 0.67–1.17)
EGFRCR SERPLBLD CKD-EPI 2021: 89 ML/MIN/1.73M2
EOSINOPHIL # BLD AUTO: 0.17 10E3/UL (ref 0–0.7)
EOSINOPHIL NFR BLD AUTO: 1.6 %
ERYTHROCYTE [DISTWIDTH] IN BLOOD BY AUTOMATED COUNT: 11.9 % (ref 10–15)
GLUCOSE BLDC GLUCOMTR-MCNC: 101 MG/DL (ref 70–99)
GLUCOSE SERPL-MCNC: 125 MG/DL (ref 70–99)
HCO3 SERPL-SCNC: 24 MMOL/L (ref 22–29)
HCT VFR BLD AUTO: 44.3 % (ref 40–53)
HGB BLD-MCNC: 15.5 G/DL (ref 13.3–17.7)
IMM GRANULOCYTES # BLD: 0.06 10E3/UL
IMM GRANULOCYTES NFR BLD: 0.6 %
INR PPP: 0.91 (ref 0.85–1.15)
LYMPHOCYTES # BLD AUTO: 3.28 10E3/UL (ref 0.8–5.3)
LYMPHOCYTES NFR BLD AUTO: 31.7 %
MCH RBC QN AUTO: 31.3 PG (ref 26.5–33)
MCHC RBC AUTO-ENTMCNC: 35 G/DL (ref 31.5–36.5)
MCV RBC AUTO: 89.3 FL (ref 78–100)
MONOCYTES # BLD AUTO: 0.81 10E3/UL (ref 0–1.3)
MONOCYTES NFR BLD AUTO: 7.8 %
NEUTROPHILS # BLD AUTO: 5.93 10E3/UL (ref 1.6–8.3)
NEUTROPHILS NFR BLD AUTO: 57.4 %
NRBC # BLD AUTO: <0.03 10E3/UL
NRBC BLD AUTO-RTO: 0 /100
PLATELET # BLD AUTO: 266 10E3/UL (ref 150–450)
POTASSIUM SERPL-SCNC: 3 MMOL/L (ref 3.4–5.3)
PROTHROMBIN TIME: 12.4 SECONDS (ref 11.8–14.8)
RBC # BLD AUTO: 4.96 10E6/UL (ref 4.4–5.9)
SODIUM SERPL-SCNC: 137 MMOL/L (ref 135–145)
TROPONIN T SERPL HS-MCNC: 7 NG/L
WBC # BLD AUTO: 10.34 10E3/UL (ref 4–11)

## 2025-08-23 PROCEDURE — 70450 CT HEAD/BRAIN W/O DYE: CPT | Mod: XU

## 2025-08-23 PROCEDURE — 36415 COLL VENOUS BLD VENIPUNCTURE: CPT | Performed by: EMERGENCY MEDICINE

## 2025-08-23 PROCEDURE — 99291 CRITICAL CARE FIRST HOUR: CPT | Performed by: NURSE PRACTITIONER

## 2025-08-23 PROCEDURE — 80048 BASIC METABOLIC PNL TOTAL CA: CPT | Performed by: EMERGENCY MEDICINE

## 2025-08-23 PROCEDURE — 85730 THROMBOPLASTIN TIME PARTIAL: CPT | Performed by: EMERGENCY MEDICINE

## 2025-08-23 PROCEDURE — 70496 CT ANGIOGRAPHY HEAD: CPT

## 2025-08-23 PROCEDURE — 250N000009 HC RX 250: Performed by: EMERGENCY MEDICINE

## 2025-08-23 PROCEDURE — 85610 PROTHROMBIN TIME: CPT | Performed by: EMERGENCY MEDICINE

## 2025-08-23 PROCEDURE — 84484 ASSAY OF TROPONIN QUANT: CPT | Performed by: EMERGENCY MEDICINE

## 2025-08-23 PROCEDURE — 255N000002 HC RX 255 OP 636: Performed by: EMERGENCY MEDICINE

## 2025-08-23 PROCEDURE — 99285 EMERGENCY DEPT VISIT HI MDM: CPT | Mod: 25 | Performed by: EMERGENCY MEDICINE

## 2025-08-23 PROCEDURE — 85004 AUTOMATED DIFF WBC COUNT: CPT | Performed by: EMERGENCY MEDICINE

## 2025-08-23 PROCEDURE — 82962 GLUCOSE BLOOD TEST: CPT

## 2025-08-23 PROCEDURE — 93005 ELECTROCARDIOGRAM TRACING: CPT

## 2025-08-23 RX ADMIN — IOHEXOL 71 ML: 350 INJECTION, SOLUTION INTRAVENOUS at 16:29

## 2025-08-23 RX ADMIN — SODIUM CHLORIDE 80 ML: 9 INJECTION, SOLUTION INTRAVENOUS at 16:36

## 2025-08-23 ASSESSMENT — ACTIVITIES OF DAILY LIVING (ADL)
ADLS_ACUITY_SCORE: 43

## 2025-08-25 LAB
ATRIAL RATE - MUSE: 105 BPM
DIASTOLIC BLOOD PRESSURE - MUSE: NORMAL MMHG
INTERPRETATION ECG - MUSE: NORMAL
P AXIS - MUSE: 19 DEGREES
PR INTERVAL - MUSE: 198 MS
QRS DURATION - MUSE: 86 MS
QT - MUSE: 314 MS
QTC - MUSE: 415 MS
R AXIS - MUSE: -1 DEGREES
SYSTOLIC BLOOD PRESSURE - MUSE: NORMAL MMHG
T AXIS - MUSE: 113 DEGREES
VENTRICULAR RATE- MUSE: 105 BPM

## 2025-08-27 ENCOUNTER — TRANSFERRED RECORDS (OUTPATIENT)
Dept: HEALTH INFORMATION MANAGEMENT | Facility: CLINIC | Age: 35
End: 2025-08-27
Payer: COMMERCIAL

## 2025-08-29 ENCOUNTER — TELEPHONE (OUTPATIENT)
Dept: CARDIOLOGY | Facility: CLINIC | Age: 35
End: 2025-08-29
Payer: COMMERCIAL

## 2025-08-29 DIAGNOSIS — Q21.12 PFO (PATENT FORAMEN OVALE): ICD-10-CM

## 2025-08-29 DIAGNOSIS — Z87.74 S/P CLOSURE OF CONGENITAL ATRIAL SEPTAL DEFECT BY PERCUTANEOUS TRANSCATHETER TECHNIQUE: ICD-10-CM

## 2025-08-29 DIAGNOSIS — Q21.20 ASD (ATRIAL SEPTAL DEFECT), OSTIUM PRIMUM: Primary | ICD-10-CM

## (undated) DEVICE — WIRE GUIDE 0.035"X260CM TERUMO GLIDEWIRE STR GR3504

## (undated) DEVICE — CLOSURE DEVICE VASCADE VENOUS XL ID10-12FR  800-1012XL-10U

## (undated) DEVICE — INTRO SHEATH MICRO PLATINUM TIP 4FRX40CM 7274

## (undated) DEVICE — INTRO SHEATH 7FRX25CM PINNACLE RSS706

## (undated) DEVICE — INTRO SHEATH AVANTI 4FRX23CM 504604T

## (undated) DEVICE — TUBING PRESSURE 30"

## (undated) DEVICE — SHEATH PNCL 25CM 8FR NO WR

## (undated) DEVICE — INTRODUCER SHEATH 12FRX12CM FAST-CATH 406128

## (undated) DEVICE — MANIFOLD KIT ANGIO AUTOMATED 014613

## (undated) DEVICE — WIRE GUIDE AMPLATZ SUPER STIFF 0.035"X260CM J-TIP

## (undated) DEVICE — CATH US 8FR -90 ACUNAV INTVASC

## (undated) DEVICE — CATH ANGIO WEDGE PRESSURE 6FRX110CM DL AI-07126

## (undated) DEVICE — KIT HAND CONTROL ACIST 014644 AR-P54

## (undated) DEVICE — PACK HEART LEFT CUSTOM PC15OT92B

## (undated) DEVICE — Device

## (undated) DEVICE — DEVICE CLOSURE VASCADE PERCUTANEOUS 800-612C-10U

## (undated) DEVICE — WIRE GUIDE 0.035"X145CM AMPLATZ XSTIFF J THSCF-35-145-3-AES

## (undated) RX ORDER — FENTANYL CITRATE 50 UG/ML
INJECTION, SOLUTION INTRAMUSCULAR; INTRAVENOUS
Status: DISPENSED
Start: 2025-05-19

## (undated) RX ORDER — ASPIRIN 325 MG
TABLET ORAL
Status: DISPENSED
Start: 2025-05-19

## (undated) RX ORDER — NICARDIPINE HCL-0.9% SOD CHLOR 1 MG/10 ML
SYRINGE (ML) INTRAVENOUS
Status: DISPENSED
Start: 2025-05-19

## (undated) RX ORDER — NITROGLYCERIN 5 MG/ML
VIAL (ML) INTRAVENOUS
Status: DISPENSED
Start: 2025-05-19

## (undated) RX ORDER — FENTANYL CITRATE 50 UG/ML
INJECTION, SOLUTION INTRAMUSCULAR; INTRAVENOUS
Status: DISPENSED
Start: 2025-02-28

## (undated) RX ORDER — LIDOCAINE HYDROCHLORIDE 10 MG/ML
INJECTION, SOLUTION EPIDURAL; INFILTRATION; INTRACAUDAL; PERINEURAL
Status: DISPENSED
Start: 2025-05-19

## (undated) RX ORDER — PROPOFOL 10 MG/ML
INJECTION, EMULSION INTRAVENOUS
Status: DISPENSED
Start: 2025-02-28

## (undated) RX ORDER — HEPARIN SODIUM 1000 [USP'U]/ML
INJECTION, SOLUTION INTRAVENOUS; SUBCUTANEOUS
Status: DISPENSED
Start: 2025-05-19

## (undated) RX ORDER — SENNOSIDES 8.6 MG
CAPSULE ORAL
Status: DISPENSED
Start: 2025-05-19

## (undated) RX ORDER — ONDANSETRON 2 MG/ML
INJECTION INTRAMUSCULAR; INTRAVENOUS
Status: DISPENSED
Start: 2025-02-28

## (undated) RX ORDER — HEPARIN SODIUM 200 [USP'U]/100ML
INJECTION, SOLUTION INTRAVENOUS
Status: DISPENSED
Start: 2025-05-19

## (undated) RX ORDER — LIDOCAINE 40 MG/G
CREAM TOPICAL
Status: DISPENSED
Start: 2025-05-19

## (undated) RX ORDER — SODIUM CHLORIDE 9 MG/ML
INJECTION, SOLUTION INTRAVENOUS
Status: DISPENSED
Start: 2025-05-19

## (undated) RX ORDER — SODIUM CHLORIDE 9 MG/ML
INJECTION, SOLUTION INTRAVENOUS
Status: DISPENSED
Start: 2025-02-28